# Patient Record
Sex: FEMALE | Race: BLACK OR AFRICAN AMERICAN | Employment: OTHER | URBAN - METROPOLITAN AREA
[De-identification: names, ages, dates, MRNs, and addresses within clinical notes are randomized per-mention and may not be internally consistent; named-entity substitution may affect disease eponyms.]

---

## 2018-09-26 RX ORDER — ROSUVASTATIN CALCIUM 5 MG/1
TABLET, COATED ORAL
Qty: 90 TAB | Refills: 1 | Status: SHIPPED | OUTPATIENT
Start: 2018-09-26 | End: 2019-04-09 | Stop reason: SDUPTHER

## 2018-09-27 RX ORDER — ENALAPRIL MALEATE 2.5 MG/1
TABLET ORAL
Qty: 90 TAB | Refills: 1 | Status: SHIPPED | OUTPATIENT
Start: 2018-09-27 | End: 2019-04-09 | Stop reason: SDUPTHER

## 2018-10-17 PROBLEM — H26.9 CATARACT: Status: ACTIVE | Noted: 2018-10-17

## 2018-10-17 PROBLEM — E11.9 DIABETES (HCC): Status: ACTIVE | Noted: 2018-10-17

## 2018-10-17 PROBLEM — I10 HTN (HYPERTENSION): Status: ACTIVE | Noted: 2018-10-17

## 2018-10-17 PROBLEM — E78.00 HYPERCHOLESTEREMIA: Status: ACTIVE | Noted: 2018-10-17

## 2018-10-17 RX ORDER — BISMUTH SUBSALICYLATE 262 MG
1 TABLET,CHEWABLE ORAL DAILY
COMMUNITY
End: 2019-03-05 | Stop reason: ALTCHOICE

## 2018-10-17 RX ORDER — ASPIRIN 81 MG/1
TABLET ORAL DAILY
COMMUNITY
End: 2020-12-01

## 2018-10-22 ENCOUNTER — OFFICE VISIT (OUTPATIENT)
Dept: FAMILY MEDICINE CLINIC | Age: 78
End: 2018-10-22

## 2018-10-22 VITALS
SYSTOLIC BLOOD PRESSURE: 168 MMHG | BODY MASS INDEX: 31.08 KG/M2 | TEMPERATURE: 97.7 F | DIASTOLIC BLOOD PRESSURE: 75 MMHG | WEIGHT: 198 LBS | HEART RATE: 82 BPM | OXYGEN SATURATION: 97 % | HEIGHT: 67 IN

## 2018-10-22 DIAGNOSIS — I10 ESSENTIAL HYPERTENSION: Primary | ICD-10-CM

## 2018-10-22 DIAGNOSIS — E11.9 TYPE 2 DIABETES MELLITUS WITHOUT COMPLICATION, WITHOUT LONG-TERM CURRENT USE OF INSULIN (HCC): ICD-10-CM

## 2018-10-22 DIAGNOSIS — E78.00 HYPERCHOLESTEREMIA: ICD-10-CM

## 2018-10-22 RX ORDER — ERGOCALCIFEROL 1.25 MG/1
50000 CAPSULE ORAL
COMMUNITY
End: 2019-03-05 | Stop reason: ALTCHOICE

## 2018-10-22 NOTE — PROGRESS NOTES
Lamberto Uriostegui is a 70 y.o. female presenting for Follow-up (6month f/u visit)  . HTN  The patient presents today for HTN follow-up. Currently taking enalapril 2.5mg  Compliance good. Taking medications daily w/o complications. Home BP readings range from 130's. SIde effects of meds:  none  Comorbid conditions:  High chol, glucose intolerance  Aspirin?  daily     Hypercholesterolemia  Medications:  rosuvastatin  Taking medication consistently  Patient denies side effects:  Myalgias, memory loss    Glucose intolerance  On sugar free diet, watching carbs,    No meds    Review of Systems   Respiratory: Negative for cough and shortness of breath. Cardiovascular: Negative for chest pain. Neurological: Negative for loss of consciousness.        Past Medical History:   Diagnosis Date    Cataract     Diverticulosis     Hyperlipidemia     Hypertension     Onychomycosis     Sciatica     Transient global amnesia      Past Surgical History:   Procedure Laterality Date    HX COLONOSCOPY  2014 12/2014: ext hemorrhoids, diverticulosis, carpet-like polyp (resected by Dr. Rai Almanzar 2/12/15) Repeat due 8/2018     HX HIP REPLACEMENT Right     HX HYSTERECTOMY  1996    HX KNEE REPLACEMENT Right      Family History   Problem Relation Age of Onset    Hypertension Mother     Stroke Mother     Diabetes Mother     Hypertension Maternal Grandmother     Stroke Maternal Grandmother      Social History     Socioeconomic History    Marital status: UNKNOWN     Spouse name: Not on file    Number of children: Not on file    Years of education: Not on file    Highest education level: Not on file   Social Needs    Financial resource strain: Not on file    Food insecurity - worry: Not on file    Food insecurity - inability: Not on file   [employer] needs - medical: Not on file   [employer] needs - non-medical: Not on file   Occupational History    Not on file   Tobacco Use    Smoking status: Never Smoker    Smokeless tobacco: Never Used   Substance and Sexual Activity    Alcohol use: No     Frequency: Never    Drug use: No    Sexual activity: Yes   Other Topics Concern    Not on file   Social History Narrative    Not on file         Current Outpatient Medications   Medication Sig Dispense Refill    ergocalciferol (VITAMIN D2) 50,000 unit capsule Take 50,000 Units by mouth.  aspirin delayed-release 81 mg tablet Take  by mouth daily.  docosahexanoic acid/epa (FISH OIL PO) Take  by mouth.  multivitamin (ONE A DAY) tablet Take 1 Tab by mouth daily.  enalapril (VASOTEC) 2.5 mg tablet TAKE 1 TABLET EVERY DAY 90 Tab 1    rosuvastatin (CRESTOR) 5 mg tablet TAKE 1 TABLET EVERY DAY 90 Tab 1         Allergies   Allergen Reactions    Penicillins Unknown (comments) and Swelling     None noted        Vitals:    10/22/18 1314   BP: 168/75   Pulse: 82   Temp: 97.7 °F (36.5 °C)   TempSrc: Oral   SpO2: 97%   Weight: 198 lb (89.8 kg)   Height: 5' 7\" (1.702 m)     Body mass index is 31.01 kg/m². Objective  General: Patient alert and oriented and in NAD  HEENT: PER/EOMI, no conjunctival pallor or scleral icterus. No thyromegaly or cervical lymphadenopathy  Heart: Regular rate and rhythm, No murmurs, rubs or gallops. Lungs: Clear to auscultation bilaterally, no wheezing, rales or rhonchi  Abd: +BS, non-tender, non-distended  Ext: No edema  Skin: No rashes or lesions noted on exposed skin  Neuro: AAOx3  Psych: Appropriate mood and affect      Assessment and Plan:    1. Essential hypertension  BP not at goal.  Weekly BP checks with nurses and FU in one month if not at goal (625/22)  - METABOLIC PANEL, BASIC  - MICROALBUMIN, UR, RAND W/ MICROALB/CREAT RATIO    2. Hypercholesteremia  On statin and ASA  - HEPATIC FUNCTION PANEL  - LIPID PANEL  - TSH 3RD GENERATION    3.  Type 2 diabetes mellitus without complication, without long-term current use of insulin ([address]  Reviewed diet again with patient. NO sugar, lower starch (100 grams of carbs per day) and 10-12 hour fast overnight.  - CBC WITH AUTOMATED DIFF  - HEMOGLOBIN A1C WITH EAG          Diagnosis and plan discussed with patient who verbillized understanding. Follow-up Disposition:  Return in about 6 months (around 4/22/2019) for Diabetes follow up.     [facility]

## 2018-10-22 NOTE — PROGRESS NOTES
Laura Goncalves is a 70 y.o. female      Chief Complaint   Patient presents with    Follow-up     6month f/u visit         1. Have you been to the ER, urgent care clinic since your last visit? Hospitalized since your last visit?  no    2. Have you seen or consulted any other health care providers outside of the [address] [place] [address] since your last visit? Include any pap smears or colon screening.    no

## 2018-10-23 LAB
ALBUMIN SERPL-MCNC: 4.6 G/DL (ref 3.5–4.8)
ALBUMIN/CREAT UR: 3.9 MG/G CREAT (ref 0–30)
ALP SERPL-CCNC: 78 IU/L (ref 39–117)
ALT SERPL-CCNC: 26 IU/L (ref 0–32)
AST SERPL-CCNC: 33 IU/L (ref 0–40)
BASOPHILS # BLD AUTO: 0 X10E3/UL (ref 0–0.2)
BASOPHILS NFR BLD AUTO: 1 %
BILIRUB DIRECT SERPL-MCNC: 0.16 MG/DL (ref 0–0.4)
BILIRUB SERPL-MCNC: 0.5 MG/DL (ref 0–1.2)
BUN SERPL-MCNC: 13 MG/DL (ref 8–27)
BUN/CREAT SERPL: 19 (ref 12–28)
CALCIUM SERPL-MCNC: 9.8 MG/DL (ref 8.7–10.3)
CHLORIDE SERPL-SCNC: 101 MMOL/L (ref 96–106)
CHOLEST SERPL-MCNC: 179 MG/DL (ref 100–199)
CO2 SERPL-SCNC: 24 MMOL/L (ref 20–29)
CREAT SERPL-MCNC: 0.68 MG/DL (ref 0.57–1)
CREAT UR-MCNC: 148.1 MG/DL
EOSINOPHIL # BLD AUTO: 0.2 X10E3/UL (ref 0–0.4)
EOSINOPHIL NFR BLD AUTO: 4 %
ERYTHROCYTE [DISTWIDTH] IN BLOOD BY AUTOMATED COUNT: 15.8 % (ref 12.3–15.4)
EST. AVERAGE GLUCOSE BLD GHB EST-MCNC: 137 MG/DL
GLUCOSE SERPL-MCNC: 82 MG/DL (ref 65–99)
HBA1C MFR BLD: 6.4 % (ref 4.8–5.6)
HCT VFR BLD AUTO: 45.9 % (ref 34–46.6)
HDLC SERPL-MCNC: 74 MG/DL
HGB BLD-MCNC: 14.9 G/DL (ref 11.1–15.9)
IMM GRANULOCYTES # BLD: 0 X10E3/UL (ref 0–0.1)
IMM GRANULOCYTES NFR BLD: 0 %
LDLC SERPL CALC-MCNC: 90 MG/DL (ref 0–99)
LYMPHOCYTES # BLD AUTO: 2.6 X10E3/UL (ref 0.7–3.1)
LYMPHOCYTES NFR BLD AUTO: 52 %
MCH RBC QN AUTO: 25.8 PG (ref 26.6–33)
MCHC RBC AUTO-ENTMCNC: 32.5 G/DL (ref 31.5–35.7)
MCV RBC AUTO: 79 FL (ref 79–97)
MICROALBUMIN UR-MCNC: 5.8 UG/ML
MONOCYTES # BLD AUTO: 0.4 X10E3/UL (ref 0.1–0.9)
MONOCYTES NFR BLD AUTO: 7 %
NEUTROPHILS # BLD AUTO: 1.8 X10E3/UL (ref 1.4–7)
NEUTROPHILS NFR BLD AUTO: 36 %
PLATELET # BLD AUTO: 288 X10E3/UL (ref 150–379)
POTASSIUM SERPL-SCNC: 4.5 MMOL/L (ref 3.5–5.2)
PROT SERPL-MCNC: 7.6 G/DL (ref 6–8.5)
RBC # BLD AUTO: 5.78 X10E6/UL (ref 3.77–5.28)
SODIUM SERPL-SCNC: 145 MMOL/L (ref 134–144)
TRIGL SERPL-MCNC: 75 MG/DL (ref 0–149)
TSH SERPL DL<=0.005 MIU/L-ACNC: 2.4 UIU/ML (ref 0.45–4.5)
VLDLC SERPL CALC-MCNC: 15 MG/DL (ref 5–40)
WBC # BLD AUTO: 5 X10E3/UL (ref 3.4–10.8)

## 2018-10-24 NOTE — PROGRESS NOTES
Call patient. Labs look good. Long term blood sugar test is still up but she does not need medication as long as she sticks to diet:  NO sugar, less starch (bread, noodles, pasta, potatoes, french fries  Be sure to get blood pressure checked as we discussed.   Goal blood pressure is under 140/90  See me in 6 months or sooner if BP is not at goal  [facility]

## 2018-10-24 NOTE — PROGRESS NOTES
Lab letter mailed to patient with following message from Dr. Jose R Webster look good. Long term blood sugar test is still up but you do not need medication as long as you sticks to the diet:  NO sugar, less starch (bread, noodles, pasta, potatoes, french fries etc.)  Be sure to get your blood pressure checked as we discussed. Limit salt in your diet. Goal blood pressure is under 140/90  See me in 6 months or sooner if Blood Pressure is not at goal  Call back number provided if patient has questions.

## 2019-03-05 ENCOUNTER — OFFICE VISIT (OUTPATIENT)
Dept: FAMILY MEDICINE CLINIC | Age: 79
End: 2019-03-05

## 2019-03-05 VITALS
RESPIRATION RATE: 18 BRPM | WEIGHT: 198 LBS | TEMPERATURE: 98.4 F | OXYGEN SATURATION: 97 % | HEIGHT: 67 IN | BODY MASS INDEX: 31.08 KG/M2 | DIASTOLIC BLOOD PRESSURE: 82 MMHG | HEART RATE: 102 BPM | SYSTOLIC BLOOD PRESSURE: 144 MMHG

## 2019-03-05 DIAGNOSIS — Z96.652 STATUS POST TOTAL LEFT KNEE REPLACEMENT: Primary | ICD-10-CM

## 2019-03-05 RX ORDER — ENALAPRIL MALEATE 2.5 MG/1
TABLET ORAL
COMMUNITY
Start: 2018-09-27 | End: 2019-03-05 | Stop reason: SDUPTHER

## 2019-03-05 RX ORDER — RIVAROXABAN 10 MG/1
TABLET, FILM COATED ORAL
Refills: 0 | COMMUNITY
Start: 2019-03-01 | End: 2019-05-21

## 2019-03-05 RX ORDER — ONDANSETRON 4 MG/1
TABLET, ORALLY DISINTEGRATING ORAL
Refills: 0 | COMMUNITY
Start: 2019-03-01 | End: 2019-05-21

## 2019-03-05 RX ORDER — HYDROCODONE BITARTRATE AND ACETAMINOPHEN 5; 325 MG/1; MG/1
TABLET ORAL
Refills: 0 | COMMUNITY
Start: 2019-03-01 | End: 2019-05-21

## 2019-03-05 RX ORDER — ROSUVASTATIN CALCIUM 5 MG/1
TABLET, COATED ORAL
COMMUNITY
Start: 2018-09-26 | End: 2019-04-09 | Stop reason: SDUPTHER

## 2019-03-05 NOTE — PROGRESS NOTES
Edvin Canada is a 70 y.o. female      Chief Complaint   Patient presents with    Follow-up     Left knee replacement          1. Have you been to the ER, urgent care clinic since your last visit? Hospitalized since your last visit? Yes knee replacement left 2/27/19    2. Have you seen or consulted any other health care providers outside of the [address] [place] [address] since your last visit? Include any pap smears or colon screening.   no

## 2019-03-05 NOTE — PATIENT INSTRUCTIONS
Total Knee Replacement: What to Expect at [address] [place] [address]    When you leave the hospital, you should be able to move around with a walker or crutches. But you will need someone to help you at home for the next few weeks or until you have more energy and can move around better. If there is no one to help you at home, you may go to a rehabilitation center. You will go home with a bandage and stitches, staples, tissue glue, or tape strips. Change the bandage as your doctor tells you to. If you have stitches or staples, your doctor will remove them 10 to 21 days after your surgery. Glue or tape strips will fall off on their own over time. You may still have some mild pain, and the area may be swollen for 3 to 6 months after surgery. Your knee will continue to improve for 6 to 12 months. You will probably use a walker for 1 to 3 weeks and then use crutches. When you are ready, you can use a cane. You will probably be able to walk on your own in 4 to 8 weeks. You will need to do months of physical rehabilitation (rehab) after a knee replacement. Rehab will help you strengthen the muscles of the knee and help you regain movement. After you recover, your artificial knee will allow you to do normal daily activities with less pain or no pain at all. You may be able to hike, dance, ride a bike, and play golf. Talk to your doctor about whether you can do more strenuous activities. Always tell your caregivers that you have an artificial knee. How long it will take to walk on your own, return to normal activities, and go back to work depends on your health and how well your rehabilitation (rehab) program goes. The better you do with your rehab exercises, the quicker you will get your strength and movement back. This care sheet gives you a general idea about how long it will take for you to recover. But each person recovers at a different pace. Follow the steps below to get better as quickly as possible.   How can you care for yourself at home? Activity    · Rest when you feel tired. You may take a nap, but do not stay in bed all day. When you sit, use a chair with arms. You can use the arms to help you stand up.     · Work with your physical therapist to find the best way to exercise. You may be able to take frequent, short walks using crutches or a walker. What you can do as your knee heals will depend on whether your new knee is cemented or uncemented. You may not be able to do certain things for a while if your new knee is uncemented.     · After your knee has healed enough, you can do more strenuous activities with caution. ? You can golf, but use a golf cart, and do not wear shoes with spikes. ? You can bike on a flat road or on a stationary bike. Avoid biking up hills. ? Your doctor may suggest that you stay away from activities that put stress on your knee. These include tennis or badminton, squash or racquetball, contact sports like football, jumping (such as in basketball), jogging, or running. ? Avoid activities where you might fall. These include horseback riding, skiing, and mountain biking.     · Do not sit for more than 1 hour at a time. Get up and walk around for a while before you sit again. If you must sit for a long time, prop up your leg with a chair or footstool. This will help you avoid swelling.     · Ask your doctor when you can drive again. It may take up to 8 weeks after knee replacement surgery before it is safe for you to drive.     · When you get into a car, sit on the edge of the seat. Then pull in your legs, and turn to face the front.     · You should be able to do many everyday activities 3 to 6 weeks after your surgery. You will probably need to take 4 to 16 weeks off from work.  When you can go back to work depends on the type of work you do and how you feel.     · Ask your doctor when it is okay for you to have sex.     · Do not lift anything heavier than 10 pounds and do not lift weights for 12 weeks. Diet    · By the time you leave the hospital, you should be eating your normal diet. If your stomach is upset, try bland, low-fat foods like plain rice, broiled chicken, toast, and yogurt. Your doctor may suggest that you take iron and vitamin supplements.     · Drink plenty of fluids (unless your doctor tells you not to).   · Eat healthy foods, and watch your portion sizes. Try to stay at your ideal weight. Too much weight puts more stress on your new knee.     · You may notice that your bowel movements are not regular right after your surgery. This is common. Try to avoid constipation and straining with bowel movements. You may want to take a fiber supplement every day. If you have not had a bowel movement after a couple of days, ask your doctor about taking a mild laxative. Medicines    · Your doctor will tell you if and when you can restart your medicines. He or she will also give you instructions about taking any new medicines.     · If you take blood thinners, such as warfarin (Coumadin), clopidogrel (Plavix), or aspirin, be sure to talk to your doctor. He or she will tell you if and when to start taking those medicines again. Make sure that you understand exactly what your doctor wants you to do.     · Your doctor may give you a blood-thinning medicine to prevent blood clots. If you take a blood thinner, be sure you get instructions about how to take your medicine safely. Blood thinners can cause serious bleeding problems. This medicine could be in pill form or as a shot (injection). If a shot is necessary, your doctor will tell you how to do this.     · Be safe with medicines. Take pain medicines exactly as directed. ? If the doctor gave you a prescription medicine for pain, take it as prescribed. ? If you are not taking a prescription pain medicine, ask your doctor if you can take an over-the-counter medicine. ? Plan to take your pain medicine 30 minutes before exercises.  It is easier to prevent pain before it starts than to stop it once it has started.     · If you think your pain medicine is making you sick to your stomach:  ? Take your medicine after meals (unless your doctor has told you not to). ? Ask your doctor for a different pain medicine.     · If your doctor prescribed antibiotics, take them as directed. Do not stop taking them just because you feel better. You need to take the full course of antibiotics. Incision care    · If your doctor told you how to care for your cut (incision), follow your doctor's instructions. You will have a dressing over the cut. A dressing helps the incision heal and protects it. Your doctor will tell you how to take care of this.     · If you did not get instructions, follow this general advice:  ? If you have strips of tape on the cut the doctor made, leave the tape on for a week or until it falls off.  ? If you have stitches or staples, your doctor will tell you when to come back to have them removed. ? If you have skin adhesive on the cut, leave it on until it falls off. Skin adhesive is also called glue or liquid stitches. ? Change the bandage every day. ? Wash the area daily with warm water, and pat it dry. Don't use hydrogen peroxide or alcohol. They can slow healing. ? You may cover the area with a gauze bandage if it oozes fluid or rubs against clothing. ? You may shower 24 to 48 hours after surgery. Pat the incision dry. Don't swim or take a bath for the first 2 weeks, or until your doctor tells you it is okay. Exercise    · Your rehab program will give you a number of exercises to do to help you get back your knee's range of motion and strength. Always do them as your therapist tells you. Ice and elevation    · For pain and swelling, put ice or a cold pack on the area for 10 to 20 minutes at a time. Put a thin cloth between the ice and your skin. Other instructions    · Continue to wear your support stockings as your doctor says. These help to prevent blood clots. The length of time that you will have to wear them depends on your activity level and the amount of swelling.     · You have metal pieces in your knee. These may set off some airport metal detectors. Carry a medical alert card that says you have an artificial joint, just in case. Follow-up care is a key part of your treatment and safety. Be sure to make and go to all appointments, and call your doctor if you are having problems. It's also a good idea to know your test results and keep a list of the medicines you take. When should you call for help? Call 911 anytime you think you may need emergency care. For example, call if:    · You passed out (lost consciousness).     · You have severe trouble breathing.     · You have sudden chest pain and shortness of breath, or you cough up blood.    Call your doctor now or seek immediate medical care if:    · You have signs of infection, such as:  ? Increased pain, swelling, warmth, or redness. ? Red streaks leading from the incision. ? Pus draining from the incision. ? A fever.     · You have signs of a blood clot, such as:  ? Pain in your calf, back of the knee, thigh, or groin. ? Redness and swelling in your leg or groin.     · Your incision comes open and begins to bleed, or the bleeding increases.     · You have pain that does not get better after you take pain medicine.    Watch closely for changes in your health, and be sure to contact your doctor if:    · You do not have a bowel movement after taking a laxative. Where can you learn more? Go to http://fredrick-elza.info/. Enter Q042 in the search box to learn more about \"Total Knee Replacement: What to Expect at Home. \"  Current as of: September 20, 2018  Content Version: 11.9  © 0009-9968 [employer]. Care instructions adapted under license by [employer] (which disclaims liability or warranty for this information).  If you have questions about a medical condition or this instruction, always ask your healthcare professional. [place] [address] any warranty or liability for your use of this information.

## 2019-04-09 ENCOUNTER — OFFICE VISIT (OUTPATIENT)
Dept: FAMILY MEDICINE CLINIC | Age: 79
End: 2019-04-09

## 2019-04-09 VITALS
HEIGHT: 67 IN | OXYGEN SATURATION: 98 % | HEART RATE: 91 BPM | TEMPERATURE: 98.5 F | DIASTOLIC BLOOD PRESSURE: 77 MMHG | SYSTOLIC BLOOD PRESSURE: 150 MMHG | BODY MASS INDEX: 32.02 KG/M2 | WEIGHT: 204 LBS | RESPIRATION RATE: 20 BRPM

## 2019-04-09 DIAGNOSIS — E74.39 GLUCOSE INTOLERANCE: ICD-10-CM

## 2019-04-09 DIAGNOSIS — I10 ESSENTIAL HYPERTENSION: Primary | ICD-10-CM

## 2019-04-09 DIAGNOSIS — E78.00 HYPERCHOLESTEREMIA: ICD-10-CM

## 2019-04-09 RX ORDER — ROSUVASTATIN CALCIUM 5 MG/1
5 TABLET, COATED ORAL DAILY
Qty: 90 TAB | Refills: 1 | Status: SHIPPED | OUTPATIENT
Start: 2019-04-09 | End: 2019-09-19 | Stop reason: SDUPTHER

## 2019-04-09 RX ORDER — ENALAPRIL MALEATE 2.5 MG/1
2.5 TABLET ORAL DAILY
Qty: 90 TAB | Refills: 1 | Status: SHIPPED | OUTPATIENT
Start: 2019-04-09 | End: 2019-05-21 | Stop reason: DRUGHIGH

## 2019-04-09 NOTE — PROGRESS NOTES
1. Have you been to the ER, urgent care clinic since your last visit? Hospitalized since your last visit? No    2. Have you seen or consulted any other health care providers outside of the [address] [place] [address] since your last visit? Include any pap smears or colon screening.  No

## 2019-04-09 NOTE — PROGRESS NOTES
Assessment and Plan    1. Essential hypertension  Near goal  - enalapril (VASOTEC) 2.5 mg tablet; Take 1 Tab by mouth daily. Dispense: 90 Tab; Refill: 1    2. Hypercholesteremia  On statin  - rosuvastatin (CRESTOR) 5 mg tablet; Take 1 Tab by mouth daily. Dispense: 90 Tab; Refill: 1    3. Glucose intolerance  Diet for weight loss and glucose lowering given. - METABOLIC PANEL, BASIC  - HEMOGLOBIN A1C WITH EAG  - CBC WITH AUTOMATED DIFF      Diagnosis and plan discussed with patient who verbillized understanding. Follow-up and Dispositions    · Return in about 6 months (around 10/9/2019) for Medication follow up, Blood pressure follow up. History of present Mariana Rodarte is a 70 y.o. female presenting for Medication Refill    Hypertension  Taking ACE consistently  Recent knee replacement without problems  No chest pain or dyspnea    Hyperchol  On crestor  Takes consistently    Glucose intolerance  Last A1c 6.4    Review of Systems   Respiratory: Negative for shortness of breath. Cardiovascular: Negative for chest pain and leg swelling. Gastrointestinal: Negative for blood in stool. Genitourinary: Negative for hematuria. Musculoskeletal: Positive for joint pain.        All other systems reviewed and are negative for a Comprehensive ROS (10+)    Past Medical History:   Diagnosis Date    Cataract     Diverticulosis     Hyperlipidemia     Hypertension     Onychomycosis     Sciatica     Transient global amnesia      Past Surgical History:   Procedure Laterality Date    HX COLONOSCOPY  2014 12/2014: ext hemorrhoids, diverticulosis, carpet-like polyp (resected by Dr. Scottie Harrell 2/12/15) Repeat due 8/2018     HX HIP REPLACEMENT Right     HX HYSTERECTOMY  1996    HX KNEE REPLACEMENT Right     HX KNEE REPLACEMENT      left 2019      Family History   Problem Relation Age of Onset    Hypertension Mother     Stroke Mother     Diabetes Mother     Hypertension Maternal Grandmother  Stroke Maternal Grandmother      Social History     Socioeconomic History    Marital status:      Spouse name: Not on file    Number of children: Not on file    Years of education: Not on file    Highest education level: Not on file   Occupational History    Not on file   Social Needs    Financial resource strain: Not on file    Food insecurity:     Worry: Not on file     Inability: Not on file    Transportation needs:     Medical: Not on file     Non-medical: Not on file   Tobacco Use    Smoking status: Never Smoker    Smokeless tobacco: Never Used   Substance and Sexual Activity    Alcohol use: No     Frequency: Never    Drug use: No    Sexual activity: Yes   Lifestyle    Physical activity:     Days per week: Not on file     Minutes per session: Not on file    Stress: Not on file   Relationships    Social connections:     Talks on phone: Not on file     Gets together: Not on file     Attends Sabianism service: Not on file     Active member of club or organization: Not on file     Attends meetings of clubs or organizations: Not on file     Relationship status: Not on file    Intimate partner violence:     Fear of current or ex partner: Not on file     Emotionally abused: Not on file     Physically abused: Not on file     Forced sexual activity: Not on file   Other Topics Concern    Not on file   Social History Narrative    Not on file         Current Outpatient Medications   Medication Sig Dispense Refill    docosahexanoic acid/epa (FISH OIL PO) Take  by mouth.  CALCIUM PO Take  by mouth.  MULTIVITAMIN PO Take  by mouth.  rosuvastatin (CRESTOR) 5 mg tablet Take 1 Tab by mouth daily. 90 Tab 1    enalapril (VASOTEC) 2.5 mg tablet Take 1 Tab by mouth daily. 90 Tab 1    calcium carbonate-vitamin d2 500 mg(1,250mg) -200 unit tab Take  by mouth.  aspirin delayed-release 81 mg tablet Take  by mouth daily.       HYDROcodone-acetaminophen (NORCO) 5-325 mg per tablet take 1-2 tablets by mouth every 4 hours if needed for pain  0    ondansetron (ZOFRAN ODT) 4 mg disintegrating tablet dissolve 1 tablet ON TONGUE every 4 hours if needed for nausea and vomiting  0    XARELTO 10 mg tablet take tablet by mouth once daily for ANTICOAGULATION  0    multivitamin, tx-iron-ca-min (THERA-M W/ IRON) 9 mg iron-400 mcg tab tablet Take 1 Tab by mouth daily. Allergies   Allergen Reactions    Penicillins Unknown (comments) and Swelling     None noted        Vitals:    04/09/19 1059   BP: 150/77   Pulse: 91   Resp: 20   Temp: 98.5 °F (36.9 °C)   TempSrc: Oral   SpO2: 98%   Weight: 204 lb (92.5 kg)   Height: 5' 7\" (1.702 m)     Body mass index is 31.95 kg/m². Objective  General: Patient alert and oriented and in NAD  Neck: No thyromegaly or cervical lymphadenopathy  Cardiovascular: Heart has regular rate and rhythm, No murmurs, rubs or gallops. No edema  Respiratory: Lungs are clear to auscultation bilaterally, no wheezing, rales or rhonchi, normal chest excursion and no increased work of breathing. Musculoskeletal: All four extremities present and functional.  Well healed Left TKR incision  Skin: No rashes or lesions noted on exposed skin  Neuro: AAOx3, normal gait and speech. No gross neurologic deficits. Psych: Appropriate mood and affect, no homicidal or suicidal ideation, no obsessions, delusions or hallucinations, normal psychomotor status.

## 2019-04-09 NOTE — PATIENT INSTRUCTIONS
Diabetes:  Blood sugar goals:  Hemoglobin A1c under 7  Fasting blood sugar   Blood sugar 2 hours after a meal under 180, 4 hours after a meal under 120  No hypoglycemia (sugars under 70 and symptomatic low sugars)    Blood sugar control with diet and exercise:  Exercise 45 minutes per day. This makes your insulin work better. It also allows insulin levels to fall helping with weight loss. Every night, try to fast from your evening meal to breakfast (at least 12 hours) without eating anything. This uses stored energy in your liver and makes insulin work better. Avoid simples sugars such as table sugar in drinks (sodas, lemonade, sweet tea, wine), desserts, candy. Also avoid fruit juices and high fructose corn syrup. Finally, drink less milk which has milk sugar in it. Control your starch intake. Men should have 3-4 carb portions per meal.  Women should have 2-3 carb portions per meal.  A carb serving is the equivalent of a slice of bread. Control your blood pressure and cholesterol. These problems are common in diabetes. AND, don't smoke. All of these problems contribute to heart disease and stroke risk. Get a yearly eye exam and flu shot. Make sure your vaccines are up to date particularly the pneumonia vaccines. Inspect your feet daily. Wear comfortable protective shoes and clean socks. Seek medical care if there are sore, calluses or numbness and burning of your feet. See your doctor at least every 6 months if you are on oral medicines or more often if the diabetic control is not at goal or if you are on insulin. Take your medicines religiously    . STOP the SUGAR    The first step in dietary efforts at weight loss is removing as much sugar from the diet as possible. Most dietary sugar is in the forms of table sugar (sucrose), fruit sugar (fructose) and milk sugar (lactose).   But, as the picture above demonstrates, you need to watch labels to see if processed foods have added sugars under other names. To eliminate sucrose, eliminate sweet drinks entirely including soft drinks, sports drinks, lemonade, sweet tea and wine. Also, eliminate candy and make desserts a \"special occasion only treat\". Don't eat desserts with every meal or every night. Most fructose is found in fruit and this should be markedly limited. Fruit juice should be avoided. One piece of fruit daily is the limit. Berries are a good choice to eat as a garnish for other foods. Bananas and grapes should be avoided. Milk sugar, or lactose, should be avoided as well. Milk is a good source of calcium but not for people struggling with weight issues. Put a little milk in your coffee or tea but otherwise avoid milk. How can you avoid sugar? For starters, don't buy it and don't bring it in the house. It won't tempt you that way! For more information:    Try the internet for videos about sugar addiction or try diet doctor.com. Carb Counting in Diabetes    Carbohydrate or carb counting is a method of calculating grams of carbohydrate consumed at meals and snacks. Foods that contain carbohydrate have the greatest effect on blood sugar compared to foods that contain protein or fat. A low carb diet has the greatest likelihood of promoting weight loss. What Foods Have Carbohydrate? Foods that contain carbohydrate or carbs are:    -grains like wheat, rice, oatmeal, and barley  -grain-based foods like bread, cereal, pasta, and crackers  -starchy vegetables like potatoes, peas and corn  -fruit and juice  -milk and yogurt  -dried beans and peas and soy products like veggie burgers  -sweets and snack foods like sodas, juice drinks, cake, cookies, candy, and chips    Non-starchy vegetables like lettuce, cucumbers, broccoli, and cauliflower have very little carbohydrate and minimal impact on your blood glucose.     Carbohydrate Servings    -In meal planning, 1 serving of a food with carbohydrate has about  15 grams of carbohydrate:  -Check serving sizes with measuring cups and spoons or a food scale. -Read the Nutrition Facts on food labels to find out how many grams of carbohydrate are in foods you eat.   -1 carb serving (15 grams) is roughly equal to one piece of bread    How much carbohydrate should I eat? Diabetics are advised to eat: For women-30-45 grams or 2-3 carb servings per meal.  For men-45-60 grams or 3-4 carb servings per meal.    For weight loss, patients should try to eat under 100 grams of carbs per day. How do I \"manage\" carbs?    -First, eliminate sugar in the form of soft drinks, candy and desserts. Minimize cakes, cookies, smoothies and juices.  -Next, identify the carbs you are eating. Watch labels and know when you are eating a starch. Eat less bread, noodles, pasta, rice, potatoes, french fries, cereals, chips, crackers and yogurt  -Eat more healthy proteins and fats with more eggs, full fat dairy products, non starchy vegetables, salads, meat, poultry, fish, cheese, berries, nuts, olive oil and butter.  -Avoid beer. Europeans refer to beer as \"liquid bread\" and it is made from grains with a high carb content. Where can I find more information? There is a lot of information about carb counting on the internet and there are books about carb counting. Try the American Diabetes Association and Dietdoctor. com        EXERCISE AND WEIGHT LOSS    You'll hear lots of different things about weight loss and exercise. There is controversy about how much exercise helps with weight loss. We'll review what you should do about exercise and a weight plan here. First, it is hard to lose weight just with exercise. It takes about 3500 extra calories burned to lose a single pound. To put exercise in perspective, most people burn about 150 calories per mile if they walk or run. Doing the math, it takes over 23 miles to burn up the energy to lose one pound.   So if you want to lose weight, exercise by itself is unlikely to help with weight loss very much. You need to work on diet and exercise at the same time to lose weight. And, you need to work on your habits and emotions since they have huge impacts on eating behaviors. But, exercise does help with weight loss. If you exercise, you burn stored fat in your muscles and that allows the levels of insulin in your body to fall. This allows the enzyme that burns fat to \"switch on\" and use stored fat for energy. That combined with a no sugar, lower starch diet combines to promote weight loss. Think of it this way:  exercise permits weight loss! Most people that lose weight and keep it off exercise. What's the right exercise? The best exercise is the one you enjoy and can keep doing! Exercise that makes you feel good physically and makes you feel good about yourself is ideal.      Exercise that elevates your heart rate for a sustained period such as running, biking,  walking and swimming improves your cardiovascular fitness. Resistance exercises such as weight lifting, strength training or calisthenics also clearly improve cardiovascular health and weight control. Stretching and yoga help with flexibility and balance. Ideally, an exercise program should include all of these different types of exercise. How much should I exercise? For weight loss, you should aim to exercise 45 minutes per day, 5-6 days per week. When you exercise 45 minutes, you exhaust the supply of fat your muscles store for energy and you help you body turn on the enzyme that burns stored fat elsewhere. This is the minimum amount of exercise you should shoot for. Remember, you don't need to think of exercise as strictly an organized period of time where that's all you do. You can increase your exercise in all your daily activities. Walk more at work or school. If you can complete an errand by walking instead of driving, do it.   Park farther away from the store if you go shopping and force yourself to walk farther. On breaks at work, walk around the office and greet your coworkers instead of sitting at your desk. Redgie Kales a few calories and enjoy the company of others. Go for a walk around the sports field while the kids practice sports. Take another parent with you. What are other benefits of exercise? While exercise helps you lose weight, it is helping you in lots of other ways. Exercise lowers your risk of diabetes and high blood pressure and makes your heart healthier. It lowers your risk of heart attacks. Exercise can help chronic lung disease and congestive heart failure improve. Exercise lowers the risk of dementia including Alzheimer's disease. Exercise lowers the risk of some cancers and decreases the risk of osteoporosis. And interestingly, exercise helps with emotionally health and lowers the risk and severity of emotional illnesses such as depression. Said simply, exercise helps you live longer and better. What will help me keep up the exercise? Remember that exercise is your gift to yourself and your family. So schedule time for your exercise and be selfish about guarding that time. It's yours! Exercise with a friend or friends and make exercise a social activity that you look forward to. Friends keep each other honest and accountable. Think of how you feel when you exercise. Most people just feel better physically and emotionally. Remember that feeling and try to recapture it. Remember exercise will help you live longer and better and will help you be there for your children and grandchildren. Make that commitment for your family. And don't forget to tell yourself that while you feel better you look better! Laureano Pat said it:  \"You look marvelous! \"      LOSE WEIGHT WHILE YOU SLEEP    Fasting    Fasting is part of a weight loss program.  Really?!?  Yes.   Fasting has been part of the human condition forever. In our modern society, many of us rarely miss a meal but our ancestors often faced prolonged periods of food scarcity so our bodies store calories as fat for that possibility. Fasting, even for a short period, helps us lose weight by burning stored sugar and fat in our liver and \"switching on\" the enzymes that help us burn stored fat. How often should I fast?    You should fast every night! It's important to give our systems a rest from eating and we should fast every night between our evening meal and breakfast.  You should try to have at least 12 hours every night where you aren't eating at all. Longer fasts    You can consider longer periods of fasting to lose weight but first a few cautions. Make sure you stay well hydrated. Drink plenty of water. If you take medications for weight loss or diabetes, discuss fasting with your doctor first.        Sleep    You can lose weight while you sleep! It makes sense if you think about it. When you sleep, the body's machinery is still running and you aren't taking in any additional calories. And, research shows that good sleeping habits promote weight loss. Sleep too little and weight loss is more difficult. Ideally for weight loss, you should sleep 7-8 hours each night. Interestingly, if you sleep at a cooler temperature, you lose more weight. You body burns more calories to stay warm.

## 2019-04-12 LAB
BASOPHILS # BLD AUTO: 0 X10E3/UL (ref 0–0.2)
BASOPHILS NFR BLD AUTO: 0 %
BUN SERPL-MCNC: 13 MG/DL (ref 8–27)
BUN/CREAT SERPL: 19 (ref 12–28)
CALCIUM SERPL-MCNC: 9.5 MG/DL (ref 8.7–10.3)
CHLORIDE SERPL-SCNC: 101 MMOL/L (ref 96–106)
CO2 SERPL-SCNC: 26 MMOL/L (ref 20–29)
CREAT SERPL-MCNC: 0.69 MG/DL (ref 0.57–1)
EOSINOPHIL # BLD AUTO: 0.1 X10E3/UL (ref 0–0.4)
EOSINOPHIL NFR BLD AUTO: 3 %
ERYTHROCYTE [DISTWIDTH] IN BLOOD BY AUTOMATED COUNT: 15.4 % (ref 12.3–15.4)
EST. AVERAGE GLUCOSE BLD GHB EST-MCNC: 131 MG/DL
GLUCOSE SERPL-MCNC: 94 MG/DL (ref 65–99)
HBA1C MFR BLD: 6.2 % (ref 4.8–5.6)
HCT VFR BLD AUTO: 42.5 % (ref 34–46.6)
HGB BLD-MCNC: 13 G/DL (ref 11.1–15.9)
IMM GRANULOCYTES # BLD AUTO: 0 X10E3/UL (ref 0–0.1)
IMM GRANULOCYTES NFR BLD AUTO: 0 %
LYMPHOCYTES # BLD AUTO: 2 X10E3/UL (ref 0.7–3.1)
LYMPHOCYTES NFR BLD AUTO: 44 %
MCH RBC QN AUTO: 25.1 PG (ref 26.6–33)
MCHC RBC AUTO-ENTMCNC: 30.6 G/DL (ref 31.5–35.7)
MCV RBC AUTO: 82 FL (ref 79–97)
MONOCYTES # BLD AUTO: 0.2 X10E3/UL (ref 0.1–0.9)
MONOCYTES NFR BLD AUTO: 5 %
NEUTROPHILS # BLD AUTO: 2.2 X10E3/UL (ref 1.4–7)
NEUTROPHILS NFR BLD AUTO: 48 %
PLATELET # BLD AUTO: 274 X10E3/UL (ref 150–379)
POTASSIUM SERPL-SCNC: 4.8 MMOL/L (ref 3.5–5.2)
RBC # BLD AUTO: 5.17 X10E6/UL (ref 3.77–5.28)
SODIUM SERPL-SCNC: 140 MMOL/L (ref 134–144)
WBC # BLD AUTO: 4.6 X10E3/UL (ref 3.4–10.8)

## 2019-05-21 ENCOUNTER — OFFICE VISIT (OUTPATIENT)
Dept: FAMILY MEDICINE CLINIC | Age: 79
End: 2019-05-21

## 2019-05-21 VITALS
SYSTOLIC BLOOD PRESSURE: 144 MMHG | HEIGHT: 67 IN | TEMPERATURE: 98.4 F | DIASTOLIC BLOOD PRESSURE: 80 MMHG | BODY MASS INDEX: 31.08 KG/M2 | WEIGHT: 198 LBS | OXYGEN SATURATION: 98 % | RESPIRATION RATE: 16 BRPM | HEART RATE: 89 BPM

## 2019-05-21 DIAGNOSIS — I10 ESSENTIAL HYPERTENSION: Primary | ICD-10-CM

## 2019-05-21 RX ORDER — ENALAPRIL MALEATE AND HYDROCHLOROTHIAZIDE 5; 12.5 MG/1; MG/1
1 TABLET ORAL DAILY
Qty: 90 TAB | Refills: 1 | Status: SHIPPED | OUTPATIENT
Start: 2019-05-21 | End: 2019-10-16 | Stop reason: SDUPTHER

## 2019-05-21 NOTE — PROGRESS NOTES
Paola Mitchell is a 70 y.o. female      Chief Complaint   Patient presents with    Blood Pressure Check     Been running high          1. Have you been to the ER, urgent care clinic since your last visit? Hospitalized since your last visit? no      2. Have you seen or consulted any other health care providers outside of the [address] [place] [address] since your last visit? Include any pap smears or colon screening.  no

## 2019-05-21 NOTE — PROGRESS NOTES
Assessment and Plan    1. Essential hypertension  Not at goal.  Tolerating ACE well so will increase dose and add diuretic  - enalapril-hydroCHLOROthiazide (VASERETIC) 5-12.5 mg tablet; Take 1 Tab by mouth daily. Dispense: 90 Tab; Refill: 1      Follow-up and Dispositions    · Return in about 6 weeks (around 7/2/2019) for Blood pressure follow up. Diagnosis and plan discussed with patient who verbillized understanding. History of present Renata Francis is a 70 y.o. female presenting for Blood Pressure Check (Been running high )    Hypertension  Seen at GYN office with systolic BP of 256  No sx  Last 3 bp's here not at goal  Remains on very low dose enalapril    Review of Systems   Respiratory: Negative for shortness of breath. Cardiovascular: Positive for leg swelling. Negative for chest pain and palpitations. Gastrointestinal: Negative for blood in stool. Genitourinary: Negative for hematuria.          Past Medical History:   Diagnosis Date    Cataract     Diverticulosis     Hyperlipidemia     Hypertension     Onychomycosis     Sciatica     Transient global amnesia      Past Surgical History:   Procedure Laterality Date    HX COLONOSCOPY  2014 12/2014: ext hemorrhoids, diverticulosis, carpet-like polyp (resected by Dr. Jose De Los Santos 2/12/15) Repeat due 8/2018     HX HIP REPLACEMENT Right     HX HYSTERECTOMY  1996    HX KNEE REPLACEMENT Right     HX KNEE REPLACEMENT      left 2019      Family History   Problem Relation Age of Onset    Hypertension Mother     Stroke Mother     Diabetes Mother     Hypertension Maternal Grandmother     Stroke Maternal Grandmother      Social History     Socioeconomic History    Marital status:      Spouse name: Not on file    Number of children: Not on file    Years of education: Not on file    Highest education level: Not on file   Occupational History    Not on file   Social Needs    Financial resource strain: Not on file   Anu.Code Food insecurity:     Worry: Not on file     Inability: Not on file    Transportation needs:     Medical: Not on file     Non-medical: Not on file   Tobacco Use    Smoking status: Never Smoker    Smokeless tobacco: Never Used   Substance and Sexual Activity    Alcohol use: No     Frequency: Never    Drug use: No    Sexual activity: Yes   Lifestyle    Physical activity:     Days per week: Not on file     Minutes per session: Not on file    Stress: Not on file   Relationships    Social connections:     Talks on phone: Not on file     Gets together: Not on file     Attends Oriental orthodox service: Not on file     Active member of club or organization: Not on file     Attends meetings of clubs or organizations: Not on file     Relationship status: Not on file    Intimate partner violence:     Fear of current or ex partner: Not on file     Emotionally abused: Not on file     Physically abused: Not on file     Forced sexual activity: Not on file   Other Topics Concern    Not on file   Social History Narrative    Not on file         Current Outpatient Medications   Medication Sig Dispense Refill    enalapril-hydroCHLOROthiazide (VASERETIC) 5-12.5 mg tablet Take 1 Tab by mouth daily. 90 Tab 1    docosahexanoic acid/epa (FISH OIL PO) Take  by mouth.  MULTIVITAMIN PO Take  by mouth.  rosuvastatin (CRESTOR) 5 mg tablet Take 1 Tab by mouth daily. 90 Tab 1    calcium carbonate-vitamin d2 500 mg(1,250mg) -200 unit tab Take  by mouth.  aspirin delayed-release 81 mg tablet Take  by mouth daily. Allergies   Allergen Reactions    Penicillins Unknown (comments) and Swelling     None noted        Vitals:    05/21/19 0949   BP: 144/80   Pulse: 89   Resp: 16   Temp: 98.4 °F (36.9 °C)   TempSrc: Oral   SpO2: 98%   Weight: 198 lb (89.8 kg)   Height: 5' 7\" (1.702 m)     Body mass index is 31.01 kg/m².     Objective  General: Patient alert and oriented and in NAD  Neck: No thyromegaly or cervical lymphadenopathy  Cardiovascular: Heart has regular rate and rhythm, No murmurs, rubs or gallops. No edema  Respiratory: Lungs are clear to auscultation bilaterally, no wheezing, rales or rhonchi, normal chest excursion and no increased work of breathing. Musculoskeletal: All four extremities present and functional.   Skin: No rashes or lesions noted on exposed skin  Neuro: AAOx3, normal gait and speech. No gross neurologic deficits. Psych: Appropriate mood and affect, no homicidal or suicidal ideation, no obsessions, delusions or hallucinations, normal psychomotor status.

## 2019-07-16 ENCOUNTER — OFFICE VISIT (OUTPATIENT)
Dept: FAMILY MEDICINE CLINIC | Age: 79
End: 2019-07-16

## 2019-07-16 VITALS
RESPIRATION RATE: 16 BRPM | OXYGEN SATURATION: 96 % | HEART RATE: 83 BPM | HEIGHT: 67 IN | WEIGHT: 197 LBS | DIASTOLIC BLOOD PRESSURE: 74 MMHG | TEMPERATURE: 98.6 F | BODY MASS INDEX: 30.92 KG/M2 | SYSTOLIC BLOOD PRESSURE: 121 MMHG

## 2019-07-16 DIAGNOSIS — I10 ESSENTIAL HYPERTENSION: ICD-10-CM

## 2019-07-16 DIAGNOSIS — H60.501 ACUTE OTITIS EXTERNA OF RIGHT EAR, UNSPECIFIED TYPE: Primary | ICD-10-CM

## 2019-07-16 RX ORDER — NEOMYCIN SULFATE, POLYMYXIN B SULFATE AND HYDROCORTISONE 10; 3.5; 1 MG/ML; MG/ML; [USP'U]/ML
3 SUSPENSION/ DROPS AURICULAR (OTIC) 4 TIMES DAILY
Qty: 10 ML | Refills: 0 | Status: SHIPPED | OUTPATIENT
Start: 2019-07-16 | End: 2019-07-26

## 2019-07-16 NOTE — PROGRESS NOTES
Tameka Hernandez is a 70 y.o. female      Chief Complaint   Patient presents with    Blood Pressure Check     f/u visit          1. Have you been to the ER, urgent care clinic since your last visit? Hospitalized since your last visit? no      2. Have you seen or consulted any other health care providers outside of the [address] [place] [address] since your last visit? Include any pap smears or colon screening.   no

## 2019-07-16 NOTE — PROGRESS NOTES
Assessment and Plan    1. Essential hypertension  Now at goal,  Continue ACE/diuretic, FU 6 months    2. Acute otitis externa of right ear, unspecified type  - neomycin-polymyxin-hydrocortisone, buffered, (PEDIOTIC) 3.5-10,000-1 mg/mL-unit/mL-% otic suspension; Administer 3 Drops in right ear four (4) times daily for 10 days. Dispense: 10 mL; Refill: 0    3. Dermatofibroma  Benign nature discussed. Try not to scratch. FU for enlargement or if desires removal.    Follow-up and Dispositions    · Return in about 6 months (around 1/16/2020) for Blood pressure follow up, Medication follow up. Diagnosis and plan discussed with patient who verbillized understanding. History of present Myrna Arguello is a 70 y.o. female presenting for Blood Pressure Check (f/u visit )    Now on enalapril hct,  Tolerating well  BP at goal  No side effects    Itching and pain right ear  Since trip to [facility]    Lesion on post neck,   Scratched scab off    Review of Systems   Constitutional: Positive for weight loss. Negative for chills and fever. HENT: Positive for ear pain. Negative for congestion, sinus pain and sore throat. Respiratory: Negative. Cardiovascular: Negative. Gastrointestinal: Negative. Genitourinary: Negative.           Past Medical History:   Diagnosis Date    Cataract     Diabetes ([address]     Type 2 diet only     Diverticulosis     Hemorrhoids     Large carpet -like rectal polyps  seen on coloscopy  2015    Hyperlipidemia     Hypertension     Onychomycosis     Onychomycosis     Sciatica     Transient global amnesia     Transient global amnesia     h/o      Past Surgical History:   Procedure Laterality Date    HX COLONOSCOPY  2014 12/2014: ext hemorrhoids, diverticulosis, carpet-like polyp (resected by Dr. Анна Portillo 2/12/15) Repeat due 8/2018     HX HIP REPLACEMENT Right     HX HYSTERECTOMY  1996    HX KNEE REPLACEMENT Right     HX KNEE REPLACEMENT      left 2019 Family History   Problem Relation Age of Onset    Hypertension Mother     Stroke Mother     Diabetes Mother     Hypertension Maternal Grandmother     Stroke Maternal Grandmother      Social History     Socioeconomic History    Marital status:      Spouse name: Not on file    Number of children: Not on file    Years of education: Not on file    Highest education level: Not on file   Occupational History    Not on file   Social Needs    Financial resource strain: Not on file    Food insecurity:     Worry: Not on file     Inability: Not on file    Transportation needs:     Medical: Not on file     Non-medical: Not on file   Tobacco Use    Smoking status: Never Smoker    Smokeless tobacco: Never Used   Substance and Sexual Activity    Alcohol use: No     Frequency: Never    Drug use: No    Sexual activity: Yes   Lifestyle    Physical activity:     Days per week: Not on file     Minutes per session: Not on file    Stress: Not on file   Relationships    Social connections:     Talks on phone: Not on file     Gets together: Not on file     Attends Sabianist service: Not on file     Active member of club or organization: Not on file     Attends meetings of clubs or organizations: Not on file     Relationship status: Not on file    Intimate partner violence:     Fear of current or ex partner: Not on file     Emotionally abused: Not on file     Physically abused: Not on file     Forced sexual activity: Not on file   Other Topics Concern    Not on file   Social History Narrative    Not on file         Current Outpatient Medications   Medication Sig Dispense Refill    neomycin-polymyxin-hydrocortisone, buffered, (PEDIOTIC) 3.5-10,000-1 mg/mL-unit/mL-% otic suspension Administer 3 Drops in right ear four (4) times daily for 10 days. 10 mL 0    enalapril-hydroCHLOROthiazide (VASERETIC) 5-12.5 mg tablet Take 1 Tab by mouth daily.  90 Tab 1    docosahexanoic acid/epa (FISH OIL PO) Take  by mouth.      MULTIVITAMIN PO Take  by mouth.  rosuvastatin (CRESTOR) 5 mg tablet Take 1 Tab by mouth daily. 90 Tab 1    calcium carbonate-vitamin d2 500 mg(1,250mg) -200 unit tab Take  by mouth.  aspirin delayed-release 81 mg tablet Take  by mouth daily. Allergies   Allergen Reactions    Penicillins Unknown (comments) and Swelling     None noted        Vitals:    07/16/19 0935   BP: 121/74   Pulse: 83   Resp: 16   Temp: 98.6 °F (37 °C)   TempSrc: Oral   SpO2: 96%   Weight: 197 lb (89.4 kg)   Height: 5' 7\" (1.702 m)     Body mass index is 30.85 kg/m². Objective  General: Patient alert and oriented and in NAD  Eyes: PER/EOMI, no conjunctival pallor or scleral icterus. ENT: Nares normal, TM's clear with normal architecture and light reflex, canal not really inflammed  Neck: No thyromegaly or cervical lymphadenopathy  Cardiovascular: Heart has regular rate and rhythm, No murmurs, rubs or gallops. No edema  Respiratory: Lungs are clear to auscultation bilaterally, no wheezing, rales or rhonchi, normal chest excursion and no increased work of breathing. Musculoskeletal: All four extremities present and functional.   Skin: 7-8 mm lesion, darkly pigmented, no distinct borders, most c/w dermatofibroma  Neuro: AAOx3, normal gait and speech. No gross neurologic deficits. Psych: Appropriate mood and affect, no homicidal or suicidal ideation, no obsessions, delusions or hallucinations, normal psychomotor status.

## 2019-09-19 DIAGNOSIS — E78.00 HYPERCHOLESTEREMIA: ICD-10-CM

## 2019-09-19 RX ORDER — ROSUVASTATIN CALCIUM 5 MG/1
5 TABLET, COATED ORAL DAILY
Qty: 90 TAB | Refills: 1 | Status: SHIPPED | OUTPATIENT
Start: 2019-09-19 | End: 2019-10-01 | Stop reason: SDUPTHER

## 2019-10-01 DIAGNOSIS — E78.00 HYPERCHOLESTEREMIA: ICD-10-CM

## 2019-10-01 RX ORDER — ROSUVASTATIN CALCIUM 5 MG/1
5 TABLET, COATED ORAL DAILY
Qty: 90 TAB | Refills: 1 | Status: SHIPPED | OUTPATIENT
Start: 2019-10-01 | End: 2020-03-18 | Stop reason: SDUPTHER

## 2019-10-01 NOTE — TELEPHONE ENCOUNTER
Pt states medication was sent to [facility] but she gets her medication through [facility].  Requesting crestor sent to [facility]    Dr Jaciel Choi on vacation

## 2019-10-16 ENCOUNTER — OFFICE VISIT (OUTPATIENT)
Dept: FAMILY MEDICINE CLINIC | Age: 79
End: 2019-10-16

## 2019-10-16 VITALS
HEART RATE: 90 BPM | SYSTOLIC BLOOD PRESSURE: 143 MMHG | BODY MASS INDEX: 31.39 KG/M2 | TEMPERATURE: 98.3 F | WEIGHT: 200 LBS | DIASTOLIC BLOOD PRESSURE: 78 MMHG | HEIGHT: 67 IN | OXYGEN SATURATION: 9 % | RESPIRATION RATE: 18 BRPM

## 2019-10-16 DIAGNOSIS — E11.9 TYPE 2 DIABETES MELLITUS WITHOUT COMPLICATION, WITHOUT LONG-TERM CURRENT USE OF INSULIN (HCC): Primary | ICD-10-CM

## 2019-10-16 DIAGNOSIS — I10 ESSENTIAL HYPERTENSION: ICD-10-CM

## 2019-10-16 DIAGNOSIS — E78.00 HYPERCHOLESTEREMIA: ICD-10-CM

## 2019-10-16 DIAGNOSIS — Z23 ENCOUNTER FOR IMMUNIZATION: ICD-10-CM

## 2019-10-16 RX ORDER — ENALAPRIL MALEATE AND HYDROCHLOROTHIAZIDE 5; 12.5 MG/1; MG/1
1 TABLET ORAL DAILY
Qty: 90 TAB | Refills: 1 | Status: SHIPPED | OUTPATIENT
Start: 2019-10-16 | End: 2019-10-16 | Stop reason: SDUPTHER

## 2019-10-16 RX ORDER — ENALAPRIL MALEATE AND HYDROCHLOROTHIAZIDE 5; 12.5 MG/1; MG/1
1 TABLET ORAL DAILY
Qty: 90 TAB | Refills: 1 | Status: SHIPPED | OUTPATIENT
Start: 2019-10-16 | End: 2020-03-18 | Stop reason: SDUPTHER

## 2019-10-16 NOTE — TELEPHONE ENCOUNTER
Pt stated she would like this medication sent to [facility]

## 2019-10-16 NOTE — PATIENT INSTRUCTIONS
Diabetes:  Blood sugar goals:  Hemoglobin A1c under 7  Fasting blood sugar   Blood sugar 2 hours after a meal under 180, 4 hours after a meal under 120  No hypoglycemia (sugars under 70 and symptomatic low sugars)    Blood sugar control with diet and exercise:  Exercise 45 minutes per day. This makes your insulin work better. It also allows insulin levels to fall helping with weight loss. Every night, try to fast from your evening meal to breakfast (at least 12 hours) without eating anything. This uses stored energy in your liver and makes insulin work better. Avoid simples sugars such as table sugar in drinks (sodas, lemonade, sweet tea, wine), desserts, candy. Also avoid fruit juices and high fructose corn syrup. Avoid frequent consumption of fruit especially grapes and bananas. A single serving of fruit daily is all you should have. Finally, drink less milk which has milk sugar in it. Control your starch intake. Men should have 3-4 carb portions per meal.  Women should have 2-3 carb portions per meal.  A carb serving is the equivalent of a slice of bread. Control your blood pressure and cholesterol. These problems are common in diabetes. AND, don't smoke. All of these problems contribute to heart disease and stroke risk. Get a yearly eye exam and flu shot. Make sure your vaccines are up to date particularly the pneumonia vaccines. Inspect your feet daily. Wear comfortable protective shoes and clean socks. Seek medical care if there are sore, calluses or numbness and burning of your feet. See your doctor at least every 6 months if you are on oral medicines or more often if the diabetic control is not at goal or if you are on insulin. Take your medicines religiously. STOP the SUGAR    The first step in dietary efforts at weight loss is removing as much sugar from the diet as possible.   Most dietary sugar is in the forms of table sugar (sucrose), fruit sugar (fructose) and milk sugar (lactose). But, as the picture above demonstrates, you need to watch labels to see if processed foods have added sugars under other names. To eliminate sucrose, eliminate sweet drinks entirely including soft drinks, sports drinks, lemonade, sweet tea and wine. Also, eliminate candy and make desserts a \"special occasion only treat\". Don't eat desserts with every meal or every night. Most fructose is found in fruit and this should be markedly limited. Fruit juice should be avoided. One piece of fruit daily is the limit. Berries are a good choice to eat as a garnish for other foods. Bananas and grapes should be avoided. Avoid high fructose corn syrup (an artificial sweetener). Milk sugar, or lactose, should be avoided as well. Milk is a good source of calcium but not for people struggling with weight issues. Put a little milk in your coffee or tea but otherwise avoid milk. How can you avoid sugar? For starters, don't buy it and don't bring it in the house. It won't tempt you that way! For more information:    Try the internet for videos about sugar addiction or try diet doctor.com. Carb Counting in Diabetes    Carbohydrate or carb counting is a method of calculating grams of carbohydrate consumed at meals and snacks. Foods that contain carbohydrate have the greatest effect on blood sugar compared to foods that contain protein or fat. A low carb diet has the greatest likelihood of promoting weight loss. What Foods Have Carbohydrate?   Foods that contain carbohydrate or carbs are:    -grains like wheat, rice, oatmeal, and barley  -grain-based foods like bread, cereal, pasta, and crackers  -starchy vegetables like potatoes, peas and corn  -fruit and juice  -milk and yogurt  -dried beans and peas and soy products like veggie burgers  -sweets and snack foods like sodas, juice drinks, cake, cookies, candy, and chips    Non-starchy vegetables like lettuce, cucumbers, broccoli, and cauliflower have very little carbohydrate and minimal impact on your blood glucose. Carbohydrate Servings    -In meal planning, 1 serving of a food with carbohydrate has about  15 grams of carbohydrate:  -Check serving sizes with measuring cups and spoons or a food scale. -Read the Nutrition Facts on food labels to find out how many grams of carbohydrate are in foods you eat.   -1 carb serving (15 grams) is roughly equal to one piece of bread    How much carbohydrate should I eat? Diabetics are advised to eat: For women-30-45 grams or 2-3 carb servings per meal.  For men-45-60 grams or 3-4 carb servings per meal.    For weight loss, patients should try to eat under 100 grams of carbs per day. How do I \"manage\" carbs?    -First, eliminate sugar in the form of soft drinks, candy and desserts. Minimize cakes, cookies, smoothies and juices.  -Next, identify the carbs you are eating. Watch labels and know when you are eating a starch. Eat less bread, noodles, pasta, rice, potatoes, french fries, cereals, chips, crackers and yogurt  -Eat more healthy proteins and fats with more eggs, full fat dairy products, non starchy vegetables, salads, meat, poultry, fish, cheese, berries, nuts, olive oil and butter.  -Avoid beer. Europeans refer to beer as \"liquid bread\" and it is made from grains with a high carb content. Where can I find more information? There is a lot of information about carb counting on the internet and there are books about carb counting. Try the American Diabetes Association and [email or web address]

## 2019-10-16 NOTE — PROGRESS NOTES
Assessment and Plan    1. Essential hypertension  Borderline BP today, recheck with nurses  - METABOLIC PANEL, BASIC  - MICROALBUMIN, UR, RAND W/ MICROALB/CREAT RATIO  - enalapril-hydroCHLOROthiazide (VASERETIC) 5-12.5 mg tablet; Take 1 Tab by mouth daily. Dispense: 90 Tab; Refill: 1    2. Type 2 diabetes mellitus without complication, without long-term current use of insulin (HCC)  Diet only, reviewed  - CBC WITH AUTOMATED DIFF  - HEMOGLOBIN A1C WITH EAG  -  DIABETES FOOT EXAM    3. Hypercholesteremia  Continue statin  - LIPID PANEL  - HEPATIC FUNCTION PANEL    4. Encounter for immunization  Flu shot and needs TDAP  - diph,Pertuss,Acell,,Tet Vac-PF (ADACEL) 2 Lf-(2.5-5-3-5 mcg)-5Lf/0.5 mL susp; 0.5 mL by IntraMUSCular route once for 1 dose. Dispense: 1 Syringe; Refill: 0  - INFLUENZA VIRUS VACCINE, HIGH DOSE SEASONAL, PRESERVATIVE FREE      Follow-up and Dispositions    · Return in about 1 month (around 11/16/2019). Diagnosis and plan discussed with patient who verbillized understanding. History of present Ildefonso Hart is a 70 y.o. female presenting for Hypertension (6 month f/u visit )    Hypertension   Taking meds as prescribed  Not checking BP's  Gets out of breath if walks a long way    DM2, diet only  Not really following diet  Eye exam up to date  Feet OK    Hypercholesterolemia  On statin without problems        Review of Systems   Respiratory: Negative for shortness of breath. Cardiovascular: Negative for chest pain and palpitations. Gastrointestinal: Negative. Negative for heartburn. Genitourinary: Negative.           Past Medical History:   Diagnosis Date    Cataract     Diabetes ([address]     Type 2 diet only     Diverticulosis     Hemorrhoids     Large carpet -like rectal polyps  seen on coloscopy  2015    Hyperlipidemia     Hypertension     Onychomycosis     Onychomycosis     Sciatica     Transient global amnesia     Transient global amnesia     h/o      Past Surgical History:   Procedure Laterality Date    HX COLONOSCOPY  2014 12/2014: ext hemorrhoids, diverticulosis, carpet-like polyp (resected by Dr. Jena Camargo 2/12/15) Repeat due 8/2018     HX HIP REPLACEMENT Right     HX HYSTERECTOMY  1996    HX KNEE REPLACEMENT Right     HX KNEE REPLACEMENT      left 2019      Family History   Problem Relation Age of Onset    Hypertension Mother     Stroke Mother     Diabetes Mother     Hypertension Maternal Grandmother     Stroke Maternal Grandmother      Social History     Socioeconomic History    Marital status:      Spouse name: Not on file    Number of children: Not on file    Years of education: Not on file    Highest education level: Not on file   Occupational History    Not on file   Social Needs    Financial resource strain: Not on file    Food insecurity:     Worry: Not on file     Inability: Not on file    Transportation needs:     Medical: Not on file     Non-medical: Not on file   Tobacco Use    Smoking status: Never Smoker    Smokeless tobacco: Never Used   Substance and Sexual Activity    Alcohol use: No     Frequency: Never    Drug use: No    Sexual activity: Yes   Lifestyle    Physical activity:     Days per week: Not on file     Minutes per session: Not on file    Stress: Not on file   Relationships    Social connections:     Talks on phone: Not on file     Gets together: Not on file     Attends [employer] service: Not on file     Active member of club or organization: Not on file     Attends meetings of clubs or organizations: Not on file     Relationship status: Not on file    Intimate partner violence:     Fear of current or ex partner: Not on file     Emotionally abused: Not on file     Physically abused: Not on file     Forced sexual activity: Not on file   Other Topics Concern    Not on file   Social History Narrative    Not on file         Current Outpatient Medications   Medication Sig Dispense Refill    enalapril-hydroCHLOROthiazide (VASERETIC) 5-12.5 mg tablet Take 1 Tab by mouth daily. 90 Tab 1    diph,Pertuss,Acell,,Tet Vac-PF (ADACEL) 2 Lf-(2.5-5-3-5 mcg)-5Lf/0.5 mL susp 0.5 mL by IntraMUSCular route once for 1 dose. 1 Syringe 0    rosuvastatin (CRESTOR) 5 mg tablet Take 1 Tab by mouth daily. 90 Tab 1    docosahexanoic acid/epa (FISH OIL PO) Take  by mouth.  MULTIVITAMIN PO Take  by mouth.  calcium carbonate-vitamin d2 500 mg(1,250mg) -200 unit tab Take  by mouth.  aspirin delayed-release 81 mg tablet Take  by mouth daily. Allergies   Allergen Reactions    Penicillins Unknown (comments) and Swelling     None noted        Vitals:    10/16/19 0953   BP: 143/78   Pulse: 90   Resp: 18   Temp: 98.3 °F (36.8 °C)   TempSrc: Oral   SpO2: (!) 9%   Weight: 200 lb (90.7 kg)   Height: 5' 7\" (1.702 m)     Body mass index is 31.32 kg/m². Objective  Physical Exam   Constitutional: She is oriented to person, place, and time. She appears well-developed and well-nourished. No distress. Eyes: Conjunctivae are normal.   Neck: Neck supple. No thyromegaly present. Cardiovascular: Normal rate, regular rhythm and normal heart sounds. Exam reveals no gallop and no friction rub. No murmur heard. Pulmonary/Chest: Effort normal and breath sounds normal. No respiratory distress. She has no wheezes. She has no rales. Musculoskeletal: She exhibits no edema. Lymphadenopathy:     She has no cervical adenopathy. Neurological: She is alert and oriented to person, place, and time. Skin: She is not diaphoretic. Psychiatric: She has a normal mood and affect. Her behavior is normal. Judgment and thought content normal.   Nursing note and vitals reviewed.       Diabetic Foot Exam:  Protective sensation is intact bilaterally. Pedal pulses are 2+ and normal bilaterally.   L foot skin inspection:  normal skin and soft tissue with no gross edema or evidence of acute injury or foot ulcer  R foot skin inspection:  skin and soft tissue appear normal with no significant edema or evidence of acute injury or foot ulcer

## 2019-10-17 LAB
ALBUMIN SERPL-MCNC: 4.6 G/DL (ref 3.5–4.8)
ALBUMIN/CREAT UR: <4.3 MG/G CREAT (ref 0–30)
ALP SERPL-CCNC: 80 IU/L (ref 39–117)
ALT SERPL-CCNC: 27 IU/L (ref 0–32)
AST SERPL-CCNC: 39 IU/L (ref 0–40)
BASOPHILS # BLD AUTO: 0.1 X10E3/UL (ref 0–0.2)
BASOPHILS NFR BLD AUTO: 1 %
BILIRUB DIRECT SERPL-MCNC: 0.14 MG/DL (ref 0–0.4)
BILIRUB SERPL-MCNC: 0.4 MG/DL (ref 0–1.2)
BUN SERPL-MCNC: 14 MG/DL (ref 8–27)
BUN/CREAT SERPL: 19 (ref 12–28)
CALCIUM SERPL-MCNC: 10.2 MG/DL (ref 8.7–10.3)
CHLORIDE SERPL-SCNC: 100 MMOL/L (ref 96–106)
CHOLEST SERPL-MCNC: 190 MG/DL (ref 100–199)
CO2 SERPL-SCNC: 25 MMOL/L (ref 20–29)
CREAT SERPL-MCNC: 0.72 MG/DL (ref 0.57–1)
CREAT UR-MCNC: 69.5 MG/DL
EOSINOPHIL # BLD AUTO: 0.2 X10E3/UL (ref 0–0.4)
EOSINOPHIL NFR BLD AUTO: 3 %
ERYTHROCYTE [DISTWIDTH] IN BLOOD BY AUTOMATED COUNT: 15.9 % (ref 12.3–15.4)
EST. AVERAGE GLUCOSE BLD GHB EST-MCNC: 140 MG/DL
GLUCOSE SERPL-MCNC: 96 MG/DL (ref 65–99)
HBA1C MFR BLD: 6.5 % (ref 4.8–5.6)
HCT VFR BLD AUTO: 43.6 % (ref 34–46.6)
HDLC SERPL-MCNC: 79 MG/DL
HGB BLD-MCNC: 14.2 G/DL (ref 11.1–15.9)
IMM GRANULOCYTES # BLD AUTO: 0 X10E3/UL (ref 0–0.1)
IMM GRANULOCYTES NFR BLD AUTO: 0 %
LDLC SERPL CALC-MCNC: 96 MG/DL (ref 0–99)
LYMPHOCYTES # BLD AUTO: 2.1 X10E3/UL (ref 0.7–3.1)
LYMPHOCYTES NFR BLD AUTO: 35 %
MCH RBC QN AUTO: 24.9 PG (ref 26.6–33)
MCHC RBC AUTO-ENTMCNC: 32.6 G/DL (ref 31.5–35.7)
MCV RBC AUTO: 77 FL (ref 79–97)
MICROALBUMIN UR-MCNC: <3 UG/ML
MONOCYTES # BLD AUTO: 0.5 X10E3/UL (ref 0.1–0.9)
MONOCYTES NFR BLD AUTO: 8 %
NEUTROPHILS # BLD AUTO: 3.1 X10E3/UL (ref 1.4–7)
NEUTROPHILS NFR BLD AUTO: 53 %
PLATELET # BLD AUTO: 296 X10E3/UL (ref 150–450)
POTASSIUM SERPL-SCNC: 4.1 MMOL/L (ref 3.5–5.2)
PROT SERPL-MCNC: 7.8 G/DL (ref 6–8.5)
RBC # BLD AUTO: 5.7 X10E6/UL (ref 3.77–5.28)
SODIUM SERPL-SCNC: 143 MMOL/L (ref 134–144)
TRIGL SERPL-MCNC: 75 MG/DL (ref 0–149)
VLDLC SERPL CALC-MCNC: 15 MG/DL (ref 5–40)
WBC # BLD AUTO: 5.9 X10E3/UL (ref 3.4–10.8)

## 2020-03-18 DIAGNOSIS — E78.00 HYPERCHOLESTEREMIA: ICD-10-CM

## 2020-03-18 DIAGNOSIS — I10 ESSENTIAL HYPERTENSION: ICD-10-CM

## 2020-03-18 RX ORDER — ENALAPRIL MALEATE AND HYDROCHLOROTHIAZIDE 5; 12.5 MG/1; MG/1
1 TABLET ORAL DAILY
Qty: 90 TAB | Refills: 1 | Status: SHIPPED | OUTPATIENT
Start: 2020-03-18 | End: 2020-07-30

## 2020-03-18 RX ORDER — ROSUVASTATIN CALCIUM 5 MG/1
5 TABLET, COATED ORAL DAILY
Qty: 90 TAB | Refills: 1 | Status: SHIPPED | OUTPATIENT
Start: 2020-03-18 | End: 2020-07-30

## 2020-07-01 ENCOUNTER — OFFICE VISIT (OUTPATIENT)
Dept: FAMILY MEDICINE CLINIC | Age: 80
End: 2020-07-01

## 2020-07-01 ENCOUNTER — HOSPITAL ENCOUNTER (OUTPATIENT)
Dept: LAB | Age: 80
Discharge: HOME | End: 2020-07-01

## 2020-07-01 VITALS
HEIGHT: 67 IN | TEMPERATURE: 97.7 F | DIASTOLIC BLOOD PRESSURE: 83 MMHG | HEART RATE: 80 BPM | SYSTOLIC BLOOD PRESSURE: 137 MMHG | BODY MASS INDEX: 29.82 KG/M2 | RESPIRATION RATE: 16 BRPM | OXYGEN SATURATION: 96 % | WEIGHT: 190 LBS

## 2020-07-01 DIAGNOSIS — R20.0 NUMBNESS AND TINGLING IN BOTH HANDS: Primary | ICD-10-CM

## 2020-07-01 DIAGNOSIS — I10 ESSENTIAL HYPERTENSION: ICD-10-CM

## 2020-07-01 DIAGNOSIS — R20.2 NUMBNESS AND TINGLING IN BOTH HANDS: Primary | ICD-10-CM

## 2020-07-01 DIAGNOSIS — E78.00 HYPERCHOLESTEREMIA: ICD-10-CM

## 2020-07-01 DIAGNOSIS — E11.9 TYPE 2 DIABETES MELLITUS WITHOUT COMPLICATION, WITHOUT LONG-TERM CURRENT USE OF INSULIN (HCC): ICD-10-CM

## 2020-07-01 DIAGNOSIS — E53.8 VITAMIN B12 DEFICIENCY: ICD-10-CM

## 2020-07-01 LAB
ALBUMIN SERPL-MCNC: 4.1 G/DL (ref 3.5–5)
ALBUMIN/GLOB SERPL: 1.1 {RATIO} (ref 1.1–2.2)
ALP SERPL-CCNC: 74 U/L (ref 45–117)
ALT SERPL-CCNC: 29 U/L (ref 12–78)
ANION GAP SERPL CALC-SCNC: 4 MMOL/L (ref 5–15)
AST SERPL-CCNC: 21 U/L (ref 15–37)
BASOPHILS # BLD: 0 K/UL (ref 0–0.1)
BASOPHILS NFR BLD: 0 % (ref 0–1)
BILIRUB DIRECT SERPL-MCNC: 0.2 MG/DL (ref 0–0.2)
BILIRUB SERPL-MCNC: 0.5 MG/DL (ref 0.2–1)
BUN SERPL-MCNC: 14 MG/DL (ref 6–20)
BUN/CREAT SERPL: 23 (ref 12–20)
CALCIUM SERPL-MCNC: 9.6 MG/DL (ref 8.5–10.1)
CHLORIDE SERPL-SCNC: 105 MMOL/L (ref 97–108)
CHOLEST SERPL-MCNC: 209 MG/DL
CO2 SERPL-SCNC: 31 MMOL/L (ref 21–32)
CREAT SERPL-MCNC: 0.62 MG/DL (ref 0.55–1.02)
CREAT UR-MCNC: 126 MG/DL
DIFFERENTIAL METHOD BLD: ABNORMAL
EOSINOPHIL # BLD: 0.1 K/UL (ref 0–0.4)
EOSINOPHIL NFR BLD: 3 % (ref 0–7)
ERYTHROCYTE [DISTWIDTH] IN BLOOD BY AUTOMATED COUNT: 16.6 % (ref 11.5–14.5)
EST. AVERAGE GLUCOSE BLD GHB EST-MCNC: 131 MG/DL
GLOBULIN SER CALC-MCNC: 3.7 G/DL (ref 2–4)
GLUCOSE SERPL-MCNC: 96 MG/DL (ref 65–100)
HBA1C MFR BLD: 6.2 % (ref 4–5.6)
HCT VFR BLD AUTO: 49.5 % (ref 35–47)
HDLC SERPL-MCNC: 79 MG/DL
HDLC SERPL: 2.6 {RATIO} (ref 0–5)
HGB BLD-MCNC: 14.9 G/DL (ref 11.5–16)
IMM GRANULOCYTES # BLD AUTO: 0 K/UL (ref 0–0.04)
IMM GRANULOCYTES NFR BLD AUTO: 0 % (ref 0–0.5)
LDLC SERPL CALC-MCNC: 116.2 MG/DL (ref 0–100)
LIPID PROFILE,FLP: ABNORMAL
LYMPHOCYTES # BLD: 1.9 K/UL (ref 0.8–3.5)
LYMPHOCYTES NFR BLD: 43 % (ref 12–49)
MAGNESIUM SERPL-MCNC: 2.3 MG/DL (ref 1.6–2.4)
MCH RBC QN AUTO: 25.9 PG (ref 26–34)
MCHC RBC AUTO-ENTMCNC: 30.1 G/DL (ref 30–36.5)
MCV RBC AUTO: 85.9 FL (ref 80–99)
MICROALBUMIN UR-MCNC: 0.73 MG/DL
MICROALBUMIN/CREAT UR-RTO: 6 MG/G (ref 0–30)
MONOCYTES # BLD: 0.3 K/UL (ref 0–1)
MONOCYTES NFR BLD: 7 % (ref 5–13)
NEUTS SEG # BLD: 2.1 K/UL (ref 1.8–8)
NEUTS SEG NFR BLD: 47 % (ref 32–75)
NRBC # BLD: 0 K/UL (ref 0–0.01)
NRBC BLD-RTO: 0 PER 100 WBC
PLATELET # BLD AUTO: 214 K/UL (ref 150–400)
PMV BLD AUTO: 11.7 FL (ref 8.9–12.9)
POTASSIUM SERPL-SCNC: 4.4 MMOL/L (ref 3.5–5.1)
PROT SERPL-MCNC: 7.8 G/DL (ref 6.4–8.2)
RBC # BLD AUTO: 5.76 M/UL (ref 3.8–5.2)
SODIUM SERPL-SCNC: 140 MMOL/L (ref 136–145)
TRIGL SERPL-MCNC: 69 MG/DL (ref ?–150)
VIT B12 SERPL-MCNC: 1325 PG/ML (ref 193–986)
VLDLC SERPL CALC-MCNC: 13.8 MG/DL
WBC # BLD AUTO: 4.5 K/UL (ref 3.6–11)

## 2020-07-01 NOTE — PATIENT INSTRUCTIONS
WRIST SPLINTS FOR CARPAL TUNNEL  [facility]    Diabetes:  Blood sugar goals:  Hemoglobin A1c under 7  Fasting blood sugar   Blood sugar 2 hours after a meal under 180, 4 hours after a meal under 120  No hypoglycemia (sugars under 70 and symptomatic low sugars)    Blood sugar control with diet and exercise:  Exercise 45 minutes per day. This makes your insulin work better. It also allows insulin levels to fall helping with weight loss. Every night, try to fast from your evening meal to breakfast (at least 12 hours) without eating anything. This uses stored energy in your liver and makes insulin work better. Avoid simples sugars such as table sugar in drinks (sodas, lemonade, sweet tea, wine), desserts, candy. Also avoid fruit juices and high fructose corn syrup. Avoid frequent consumption of fruit especially grapes and bananas. A single serving of fruit daily is all you should have. Finally, drink less milk which has milk sugar in it. Control your starch intake. Men should have 3-4 carb portions per meal.  Women should have 2-3 carb portions per meal.  A carb serving is the equivalent of a slice of bread. Control your blood pressure and cholesterol. These problems are common in diabetes. AND, don't smoke. All of these problems contribute to heart disease and stroke risk. Get a yearly eye exam and flu shot. Make sure your vaccines are up to date particularly the pneumonia vaccines. Inspect your feet daily. Wear comfortable protective shoes and clean socks. Seek medical care if there are sore, calluses or numbness and burning of your feet. See your doctor at least every 6 months if you are on oral medicines or more often if the diabetic control is not at goal or if you are on insulin. Take your medicines religiously. The goal blood pressure for most patients is 140/90.     The whole point of treating blood pressure is to prevent strokes, heart attacks and kidney damage. Persistently elevated blood pressure damages blood vessels and can lead to catastrophic heart problems and strokes. Recommendations for Hypertension (elevated blood pressure):    · Purchase a blood pressure cuff that goes around your upper arm and check blood pressure at least 3 times per week. Write down your numbers for review. Check blood pressure in the morning and evening. Rest for 5 minutes with feet elevated and arm resting on a table (at mid-chest level) when checking blood pressure    · Exercise 30-60 minutes most days of the week, preferably with a mix of cardiovascular and strength training. Exercise can improve blood pressure, even if you do not lose weight! · Limit alcohol intake to less than 3-4 drinks per week. · Avoid tobacco products    · Avoid illegal drugs especially amphetamines  · DASH diet - includes fruits, vegetables, fiber, and less than 2000 mg sodium (salt) daily. · Try to eat a low sugar diet. Sugar worsens diabetes and activates kidney hormones that raise blood pressure. · Avoid non-steroidal inflammatory medications (NSAIDS) such as ibuprofen, advil, motrin, aleve, and naproxyn as these may increase blood pressure if used long-term    · Avoid OTC decongestants such as pseudopherine, phenylephrine, Afrin  · Take your blood pressure medicine (and aspirin if prescribed) religiously            STOP the SUGAR    The first step in dietary efforts at weight loss is removing as much sugar from the diet as possible. Most dietary sugar is in the forms of table sugar (sucrose), fruit sugar (fructose) and milk sugar (lactose). But, as the picture above demonstrates, you need to watch labels to see if processed foods have added sugars under other names. To eliminate sucrose, eliminate sweet drinks entirely including soft drinks, sports drinks, lemonade, sweet tea and wine. Also, eliminate candy and make desserts a \"special occasion only treat\".   Don't eat desserts with every meal or every night. Most fructose is found in fruit and this should be markedly limited. Fruit juice should be avoided. One piece of fruit daily is the limit. Berries are a good choice to eat as a garnish for other foods. Bananas and grapes should be avoided. Avoid high fructose corn syrup (an artificial sweetener). Milk sugar, or lactose, should be avoided as well. Milk is a good source of calcium but not for people struggling with weight issues. Put a little milk in your coffee or tea but otherwise avoid milk. How can you avoid sugar? For starters, don't buy it and don't bring it in the house. It won't tempt you that way!     For more information:    Try the internet for videos about sugar addiction or try [email or web address].

## 2020-07-01 NOTE — PROGRESS NOTES
Assessment and Plan    1. Hypercholesteremia  Continue statin  - HEPATIC FUNCTION PANEL; Future  - LIPID PANEL; Future    2. Essential hypertension  Same meds  - METABOLIC PANEL, BASIC; Future  - MICROALBUMIN, UR, RAND W/ MICROALB/CREAT RATIO; Future  - MAGNESIUM; Future    3. Type 2 diabetes mellitus without complication, without long-term current use of insulin (HCC)  Stick to diet. Doing well  - CBC WITH AUTOMATED DIFF; Future  - HEMOGLOBIN A1C WITH EAG; Future    4. Vitamin B12 deficiency  With numbness, check B12 and Mag++  - VITAMIN B12; Future    5. Numbness and tingling in both hands  Likely carpal tunnel, wrist splints for two weeks. FU if no relief. Follow-up and Dispositions    · Return in about 6 months (around 1/1/2021) for Blood pressure follow up, Diabetes follow up. Diagnosis and plan discussed with patient who verbillized understanding. History of present Hortencia Huertas is a 68 y.o. female presenting for Numbness (bilateral hand numbness since March )    Numbness both hand   L>R  Started in March. No falls or injuries  No new chronic activities. Not painful. Numb all day every day. No neck pain. No Headache. No weakness  No numbness in feet. Hypertension  At goal  Takes meds consistently    DM2  Diet only  Has lost 10 pounds. Hypercholesterolemia  On statin. Review of Systems   Constitutional: Negative for chills and fever. Respiratory: Negative for shortness of breath. Cardiovascular: Negative for chest pain. Gastrointestinal: Negative. Genitourinary: Negative. Neurological: Positive for sensory change. Negative for tingling, focal weakness and weakness. Psychiatric/Behavioral: Negative.           Past Medical History:   Diagnosis Date    Cataract     Diabetes ([facility] [address]     Type 2 diet only     Diverticulosis     Hemorrhoids     Large carpet -like rectal polyps  seen on coloscopy  2015    Hyperlipidemia     Hypertension     Onychomycosis  Onychomycosis     Sciatica     Transient global amnesia     Transient global amnesia     h/o      Past Surgical History:   Procedure Laterality Date    HX COLONOSCOPY  2014 12/2014: ext hemorrhoids, diverticulosis, carpet-like polyp (resected by Dr. Attila Portillo 2/12/15) Repeat due 8/2018     HX HIP REPLACEMENT Right     HX HYSTERECTOMY  1996    HX KNEE REPLACEMENT Right     HX KNEE REPLACEMENT      left 2019      Family History   Problem Relation Age of Onset    Hypertension Mother     Stroke Mother     Diabetes Mother     Hypertension Maternal Grandmother     Stroke Maternal Grandmother      Social History     Socioeconomic History    Marital status:      Spouse name: Not on file    Number of children: Not on file    Years of education: Not on file    Highest education level: Not on file   Occupational History    Not on file   Social Needs    Financial resource strain: Not on file    Food insecurity     Worry: Not on file     Inability: Not on file    Transportation needs     Medical: Not on file     Non-medical: Not on file   Tobacco Use    Smoking status: Never Smoker    Smokeless tobacco: Never Used   Substance and Sexual Activity    Alcohol use: No     Frequency: Never    Drug use: No    Sexual activity: Yes   Lifestyle    Physical activity     Days per week: Not on file     Minutes per session: Not on file    Stress: Not on file   Relationships    Social connections     Talks on phone: Not on file     Gets together: Not on file     Attends [employer] service: Not on file     Active member of club or organization: Not on file     Attends meetings of clubs or organizations: Not on file     Relationship status: Not on file    Intimate partner violence     Fear of current or ex partner: Not on file     Emotionally abused: Not on file     Physically abused: Not on file     Forced sexual activity: Not on file   Other Topics Concern    Not on file   Social History Narrative  Not on file         Prior to Admission medications    Medication Sig Start Date End Date Taking? Authorizing Provider   rosuvastatin (CRESTOR) 5 mg tablet Take 1 Tab by mouth daily. 3/18/20  Yes Chuck Becker MD   enalapril-hydroCHLOROthiazide (VASERETIC) 5-12.5 mg tablet Take 1 Tab by mouth daily. 3/18/20  Yes Chuck Becker MD   docosahexanoic acid/epa (FISH OIL PO) Take  by mouth. Yes Provider, Historical   MULTIVITAMIN PO Take  by mouth. Yes Provider, Historical   calcium carbonate-vitamin d2 500 mg(1,250mg) -200 unit tab Take  by mouth. Yes Provider, Historical   aspirin delayed-release 81 mg tablet Take  by mouth daily. Yes Provider, Historical        Allergies   Allergen Reactions    Penicillins Unknown (comments) and Swelling     None noted        Vitals:    07/01/20 1132   BP: 137/83   Pulse: 80   Resp: 16   Temp: 97.7 °F (36.5 °C)   TempSrc: Oral   SpO2: 96%   Weight: 190 lb (86.2 kg)   Height: 5' 7\" (1.702 m)     Body mass index is 29.76 kg/m². Objective  Physical Exam  Vitals signs and nursing note reviewed. Constitutional:       Appearance: Normal appearance. She is not toxic-appearing. HENT:      Head: Normocephalic and atraumatic. Neck:      Musculoskeletal: No muscular tenderness. Cardiovascular:      Rate and Rhythm: Normal rate and regular rhythm. Heart sounds: Normal heart sounds. No murmur. No gallop. Pulmonary:      Effort: Pulmonary effort is normal. No respiratory distress. Breath sounds: Normal breath sounds. No wheezing, rhonchi or rales. Lymphadenopathy:      Cervical: No cervical adenopathy. Neurological:      Mental Status: She is alert. Psychiatric:         Mood and Affect: Mood normal.         Behavior: Behavior normal.         Thought Content: Thought content normal.         Judgment: Judgment normal.       Both wrists negative Phalens. FROM. Normal vascular exam and no weakness throughout.   NECK examination:   Inspection: normal skin, soft tissue and bony appearance   Normal cervical lordosis; no gross edema or evidence of acute injury;   No previous surgical scars   Palpation: pain free to palpation  Neurovascular: normal sensory exam of C1 through T2 to light touch and pain;   Deep Tendon Reflexes: 2/4 bilat bicep;  2/4 bilat tricep;   Muscular Strength:  5/5 graded muscle strength of the intrinsic muscles of the cervical spine, the deltoid, bicep, tricep, wrist and finger flexors and extensors including the hand intrinsics;   Range of Motion: full active ROM with flexion and extension, left rotation, and right rotation; left and right lateral flexion

## 2020-07-01 NOTE — PROGRESS NOTES
Identified pt with two pt identifiers(name and ). Reviewed record in preparation for visit and have obtained necessary documentation. Chief Complaint   Patient presents with    Numbness     bilateral hand numbness since March         Health Maintenance Due   Topic    DTaP/Tdap/Td series (1 - Tdap)    Bone Densitometry (Dexa) Screening     Shingrix Vaccine Age 50> (2 of 2)       Coordination of Care Questionnaire:  :   1) Have you been to an emergency room, urgent care, or hospitalized since your last visit? If yes, where when, and reason for visit? No       2. Have seen or consulted any other health care provider since your last visit? If yes, where when, and reason for visit?   No       Mammogram 2020

## 2020-07-02 NOTE — PROGRESS NOTES
Your blood work is all fine. The cholesterol is well controlled and so is the diabetes. Give the splints a try and see me in 2-3 weeks.   [facility]

## 2020-07-23 ENCOUNTER — OFFICE VISIT (OUTPATIENT)
Dept: FAMILY MEDICINE CLINIC | Age: 80
End: 2020-07-23

## 2020-07-23 ENCOUNTER — HOSPITAL ENCOUNTER (OUTPATIENT)
Dept: LAB | Age: 80
Discharge: HOME | End: 2020-07-23

## 2020-07-23 VITALS
BODY MASS INDEX: 29.66 KG/M2 | DIASTOLIC BLOOD PRESSURE: 85 MMHG | RESPIRATION RATE: 16 BRPM | OXYGEN SATURATION: 97 % | HEART RATE: 86 BPM | TEMPERATURE: 97.8 F | SYSTOLIC BLOOD PRESSURE: 146 MMHG | WEIGHT: 189 LBS | HEIGHT: 67 IN

## 2020-07-23 DIAGNOSIS — R20.2 NUMBNESS AND TINGLING IN BOTH HANDS: ICD-10-CM

## 2020-07-23 DIAGNOSIS — R20.0 NUMBNESS AND TINGLING IN BOTH HANDS: Primary | ICD-10-CM

## 2020-07-23 DIAGNOSIS — R20.2 NUMBNESS AND TINGLING IN BOTH HANDS: Primary | ICD-10-CM

## 2020-07-23 DIAGNOSIS — R20.0 NUMBNESS AND TINGLING IN BOTH HANDS: ICD-10-CM

## 2020-07-23 LAB — TSH SERPL DL<=0.05 MIU/L-ACNC: 2.12 UIU/ML (ref 0.36–3.74)

## 2020-07-23 NOTE — PROGRESS NOTES
Identified pt with two pt identifiers(name and ). Reviewed record in preparation for visit and have obtained necessary documentation. No chief complaint on file. Health Maintenance Due   Topic    DTaP/Tdap/Td series (1 - Tdap)    Bone Densitometry (Dexa) Screening     Shingrix Vaccine Age 50> (2 of 2)       Coordination of Care Questionnaire:  :   1) Have you been to an emergency room, urgent care, or hospitalized since your last visit? If yes, where when, and reason for visit?  no       2. Have seen or consulted any other health care provider since your last visit?  If yes, where when, and reason for visit? no

## 2020-07-23 NOTE — PROGRESS NOTES
Assessment and Plan    1. Numbness and tingling in both hands  Glove distribution without lower extremity involvement. No better with splints for carpal tunnel. No clear etiology. Referral Neuro Dr. Ygnacio Galeazzi. - TSH 3RD GENERATION; Future  - XR SPINE CERV 4 OR 5 V; Future  - REFERRAL TO NEUROLOGY      Follow-up and Dispositions    · Return in about 1 month (around 8/23/2020) for follow up of numbness. Diagnosis and plan discussed with patient who verbillized understanding. History of present Jenelle Rivero is a 68 y.o. female presenting for numbness of hands    History of present Jenelle Rivero is a 68 y.o. female presenting for Numbness (bilateral hand numbness since March )    Numbness both hand   L>R  Started in March. No falls or injuries  No new chronic activities. Not painful. Numb all day every day. No neck pain. No Headache. No weakness  No numbness in feet. Both dorsal and palmar  No better with splints at all  Involves all finger  Like the hands have \"fallen asleep\"  No pins and needles. Feels puffy. Hair also falling out. Review of Systems   Musculoskeletal: Negative for falls and neck pain. Neurological: Positive for tingling and sensory change. Negative for focal weakness and weakness. Psychiatric/Behavioral: Negative.           Past Medical History:   Diagnosis Date    Cataract     Diabetes ([address]     Type 2 diet only     Diverticulosis     Hemorrhoids     Large carpet -like rectal polyps  seen on coloscopy  2015    Hyperlipidemia     Hypertension     Onychomycosis     Onychomycosis     Sciatica     Transient global amnesia     Transient global amnesia     h/o      Past Surgical History:   Procedure Laterality Date    HX COLONOSCOPY  2014 12/2014: ext hemorrhoids, diverticulosis, carpet-like polyp (resected by Dr. Emery Stratton 2/12/15) Repeat due 8/2018     HX HIP REPLACEMENT Right     HX HYSTERECTOMY  1996    HX KNEE REPLACEMENT Right     HX KNEE REPLACEMENT      left 2019      Family History   Problem Relation Age of Onset    Hypertension Mother     Stroke Mother     Diabetes Mother     Hypertension Maternal Grandmother     Stroke Maternal Grandmother      Social History     Socioeconomic History    Marital status:      Spouse name: Not on file    Number of children: Not on file    Years of education: Not on file    Highest education level: Not on file   Occupational History    Not on file   Social Needs    Financial resource strain: Not on file    Food insecurity     Worry: Not on file     Inability: Not on file   [employer] needs     Medical: Not on file     Non-medical: Not on file   Tobacco Use    Smoking status: Never Smoker    Smokeless tobacco: Never Used   Substance and Sexual Activity    Alcohol use: No     Frequency: Never    Drug use: No    Sexual activity: Yes   Lifestyle    Physical activity     Days per week: Not on file     Minutes per session: Not on file    Stress: Not on file   Relationships    Social connections     Talks on phone: Not on file     Gets together: Not on file     Attends Alevism service: Not on file     Active member of club or organization: Not on file     Attends meetings of clubs or organizations: Not on file     Relationship status: Not on file    Intimate partner violence     Fear of current or ex partner: Not on file     Emotionally abused: Not on file     Physically abused: Not on file     Forced sexual activity: Not on file   Other Topics Concern    Not on file   Social History Narrative    Not on file         Prior to Admission medications    Medication Sig Start Date End Date Taking? Authorizing Provider   rosuvastatin (CRESTOR) 5 mg tablet Take 1 Tab by mouth daily. 3/18/20  Yes Jimy Baker MD   enalapril-hydroCHLOROthiazide (VASERETIC) 5-12.5 mg tablet Take 1 Tab by mouth daily.  3/18/20  Yes Jimy Baker MD   docosahexanoic acid/epa (FISH OIL PO) Take  by mouth.   Yes Provider, Historical   MULTIVITAMIN PO Take  by mouth. Yes Provider, Historical   calcium carbonate-vitamin d2 500 mg(1,250mg) -200 unit tab Take  by mouth. Yes Provider, Historical   aspirin delayed-release 81 mg tablet Take  by mouth daily. Yes Provider, Historical        Allergies   Allergen Reactions    Penicillins Unknown (comments) and Swelling     None noted        Vitals:    07/23/20 0914   BP: 146/85   Pulse: 86   Resp: 16   Temp: 97.8 °F (36.6 °C)   TempSrc: Oral   SpO2: 97%   Weight: 189 lb (85.7 kg)   Height: 5' 7\" (1.702 m)     Body mass index is 29.6 kg/m². Objective  Physical Exam  General: Patient alert and oriented and in NAD  NECK examination:   Inspection:  normal skin, soft tissue and bony appearance   Normal cervical lordosis; no gross edema or evidence of acute injury; No previous surgical scars   Palpation: pain free to palpation  Neurovascular: normal sensory exam of C1 through T2 to light touch and pain;   Deep Tendon Reflexes: 2/4 bilat bicep;  2/4 bilat tricep;   Muscular Strength:  5/5 graded muscle strength of the intrinsic muscles of the cervical spine, the deltoid, bicep, tricep, wrist and finger flexors and extensors including the hand intrinsics;   Range of Motion: full active ROM with flexion and extension, left rotation, and right rotation; left and right lateral flexion  Manuevers:  Spurling sign-negative bilaterally  Avila sign-negative bilaterally  Axial pressure-pain  free  Neuro: AAOx3, normal gait and speech. No gross neurologic deficits. Psych: Appropriate mood and affect, no homicidal or suicidal ideation, no obsessions, delusions or hallucinations, normal psychomotor status.

## 2020-07-24 NOTE — PROGRESS NOTES
Your thyroid test is fine. I'll be in touch about the xrays. See the brain doctor as we discussed.   [facility]

## 2020-07-29 DIAGNOSIS — I10 ESSENTIAL HYPERTENSION: ICD-10-CM

## 2020-07-29 DIAGNOSIS — E78.00 HYPERCHOLESTEREMIA: ICD-10-CM

## 2020-07-30 RX ORDER — ROSUVASTATIN CALCIUM 5 MG/1
TABLET, COATED ORAL
Qty: 90 TAB | Refills: 1 | Status: SHIPPED | OUTPATIENT
Start: 2020-07-30 | End: 2020-12-17

## 2020-07-30 RX ORDER — ENALAPRIL MALEATE AND HYDROCHLOROTHIAZIDE 5; 12.5 MG/1; MG/1
TABLET ORAL
Qty: 90 TAB | Refills: 1 | Status: SHIPPED | OUTPATIENT
Start: 2020-07-30 | End: 2020-12-17

## 2020-08-05 ENCOUNTER — HOSPITAL ENCOUNTER (OUTPATIENT)
Dept: GENERAL RADIOLOGY | Age: 80
Discharge: HOME | End: 2020-08-05
Attending: FAMILY MEDICINE
Payer: MEDICARE

## 2020-08-05 ENCOUNTER — OFFICE VISIT (OUTPATIENT)
Dept: NEUROLOGY | Age: 80
End: 2020-08-05
Payer: MEDICARE

## 2020-08-05 VITALS
TEMPERATURE: 97.1 F | OXYGEN SATURATION: 99 % | SYSTOLIC BLOOD PRESSURE: 140 MMHG | BODY MASS INDEX: 29.66 KG/M2 | RESPIRATION RATE: 16 BRPM | WEIGHT: 189 LBS | DIASTOLIC BLOOD PRESSURE: 70 MMHG | HEIGHT: 67 IN | HEART RATE: 68 BPM

## 2020-08-05 DIAGNOSIS — R20.2 PARESTHESIA: ICD-10-CM

## 2020-08-05 DIAGNOSIS — R20.2 NUMBNESS AND TINGLING IN BOTH HANDS: ICD-10-CM

## 2020-08-05 DIAGNOSIS — R20.0 NUMBNESS AND TINGLING IN BOTH HANDS: ICD-10-CM

## 2020-08-05 DIAGNOSIS — R20.2 PARESTHESIA: Primary | ICD-10-CM

## 2020-08-05 PROCEDURE — 72050 X-RAY EXAM NECK SPINE 4/5VWS: CPT

## 2020-08-05 PROCEDURE — 99204 OFFICE O/P NEW MOD 45 MIN: CPT | Performed by: PSYCHIATRY & NEUROLOGY

## 2020-08-05 NOTE — PROGRESS NOTES
Chief Complaint   Patient presents with    New Patient     PCP referred patient c/o numbness in hands since March. Patient states she woke up one morning with it and it hasnt gone away. No pain, just numbness.      Visit Vitals  /70 (BP 1 Location: Right arm, BP Patient Position: Sitting)   Pulse 68   Temp 97.1 °F (36.2 °C)   Resp 16   Ht 5' 7\" (1.702 m)   Wt 85.7 kg (189 lb)   SpO2 99%   BMI 29.60 kg/m²

## 2020-08-05 NOTE — PROGRESS NOTES
NEUROLOGY NEW PATIENT OFFICE CONSULTATION      8/5/2020    RE: Judith Sherman         1940      REFERRED BY:  Denis Greene MD        CHIEF COMPLAINT:  This is Judith Sherman is a 68 y.o. female right handed lives with  who had concerns including New Patient (PCP referred patient c/o numbness in hands since March. Patient states she woke up one morning with it and it hasnt gone away. No pain, just numbness. ). HPI:     Middle of March 2020, patient started noticing numbness in both hands, symmetric, involving all fingers,  Described as tightness, constant, no aggravating/relieving factor. (-) weakness. Tried using wrist splint for 2 weeks with no benefit.     (-) neck pain   (-) wrist pain  (-) elbow pain    ROS   (-) fever  (-) rash  All other systems reviewed and are negative    Past Medical Hx  Past Medical History:   Diagnosis Date    Cataract     Diabetes ([facility] [address]     Type 2 diet only     Diverticulosis     Hemorrhoids     Large carpet -like rectal polyps  seen on coloscopy  2015    Hyperlipidemia     Hypertension     Onychomycosis     Onychomycosis     Sciatica     Transient global amnesia     Transient global amnesia     h/o        Social Hx  Social History     Socioeconomic History    Marital status:      Spouse name: Not on file    Number of children: Not on file    Years of education: Not on file    Highest education level: Not on file   Tobacco Use    Smoking status: Never Smoker    Smokeless tobacco: Never Used   Substance and Sexual Activity    Alcohol use: No     Frequency: Never    Drug use: No    Sexual activity: Yes       Family Hx  Family History   Problem Relation Age of Onset    Hypertension Mother     Stroke Mother     Diabetes Mother     Hypertension Maternal Grandmother     Stroke Maternal Grandmother        ALLERGIES  Allergies   Allergen Reactions    Penicillins Unknown (comments) and Swelling     None noted        CURRENT MEDS  Current Outpatient Medications   Medication Sig Dispense Refill    rosuvastatin (CRESTOR) 5 mg tablet TAKE 1 TABLET EVERY DAY 90 Tab 1    enalapril-hydroCHLOROthiazide (VASERETIC) 5-12.5 mg tablet TAKE 1 TABLET EVERY DAY 90 Tab 1    docosahexanoic acid/epa (FISH OIL PO) Take  by mouth.  MULTIVITAMIN PO Take  by mouth.  calcium carbonate-vitamin d2 500 mg(1,250mg) -200 unit tab Take  by mouth.  aspirin delayed-release 81 mg tablet Take  by mouth daily. PREVIOUS WORKUP: (reviewed)  IMAGING:    CT Results (recent):  No results found for this or any previous visit. MRI Results (recent):  No results found for this or any previous visit. IR Results (recent):  No results found for this or any previous visit. VAS/US Results (recent):  No results found for this or any previous visit. LABS (reviewed)  Results for orders placed or performed during the hospital encounter of 07/23/20   TSH 3RD GENERATION   Result Value Ref Range    TSH 2.12 0.36 - 3.74 uIU/mL       Physical Exam:     Visit Vitals  /70 (BP 1 Location: Right arm, BP Patient Position: Sitting)   Pulse 68   Temp 97.1 °F (36.2 °C)   Resp 16   Ht 5' 7\" (1.702 m)   Wt 85.7 kg (189 lb)   SpO2 99%   BMI 29.60 kg/m²     General:  Alert, cooperative, no distress. Head:  Normocephalic, without obvious abnormality, atraumatic. Eyes:  Conjunctivae/corneas clear. Lungs:  Heart:   Non labored breathing  Regular rate and rhythm, no carotid bruits   Abdomen:   Soft, non-distended   Extremities: Extremities normal, atraumatic, no cyanosis or edema. Pulses: 2+ and symmetric all extremities. Skin: Skin color, texture, turgor normal. No rashes or lesions.   Neurologic Exam     Gen: Attention normal             Language: naming, repetition, fluency normal             Memory: intact recent and remote memory  Cranial Nerves:  I: smell Not tested   II: visual fields Full to confrontation   II: pupils Equal, round, reactive to light II: optic disc No papilledema   III,VII: ptosis none   III,IV,VI: extraocular muscles  Full ROM   V: mastication normal   V: facial light touch sensation  normal   VII: facial muscle function   symmetric   VIII: hearing symmetric   IX: soft palate elevation  normal   XI: trapezius strength  5/5   XI: sternocleidomastoid strength 5/5   XI: neck flexion strength  5/5   XII: tongue  midline     Motor: normal bulk and tone, no tremor              Strength: 5/5 all four extremities  (-) Tinnels  (-) Phalens  Sensory: intact to LT, PP, vibration, and JPS  Reflexes: 2+ throughout; Down going toes  Coordination: Good FTN and HTS  Gait: normal gait including tandem            Impression:     Ngozi Harris is a 68 y.o. female who  has a past medical history of Cataract, Diabetes ([address] Diverticulosis, Hemorrhoids, Hyperlipidemia, Hypertension, Onychomycosis, Onychomycosis, Sciatica, Transient global amnesia, and Transient global amnesia. who since middle of March 2020, started noticing numbness in both hands, symmetric, involving all fingers, described as tightness, constant, no aggravating/relieving factor. (-) weakness. Tried using wrist splint for 2 weeks with no benefit. Considerations include bilateral carpal tunnel syndrome and cervical radiculopathy (although patient denies neck pain)    RECOMMENDATIONS  1. I had a long discussion with patient. Discussed diagnosis, prognosis, pathophysiology and available treatment. All questions were answered. 2. Will do EMG/NCS of both UE with CTS protocol  3. Patient is scheduled for X-ray of the neck  4. Depending on above, will consider MRI cervical spine and referral to hand surgeon        Follow-up and Dispositions    · Return for above testing.             Thank you for the consultation      Dotty Carrillo MD  Diplomate, American Board of Psychiatry and Neurology  Diplomate, Neuromuscular Medicine  Diplomate, American Board of Electrodiagnostic Medicine        CC: Jimy Baker MD  Fax: [phone]

## 2020-08-11 NOTE — PROGRESS NOTES
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 Get the additional tests as advised by the neurologist.  See me once the neurologist has worked through these issues with you.   [facility]

## 2020-08-21 ENCOUNTER — OFFICE VISIT (OUTPATIENT)
Dept: NEUROLOGY | Age: 80
End: 2020-08-21

## 2020-08-21 VITALS — TEMPERATURE: 96.9 F

## 2020-08-21 DIAGNOSIS — R20.0 NUMBNESS AND TINGLING IN BOTH HANDS: Primary | ICD-10-CM

## 2020-08-21 DIAGNOSIS — R20.2 NUMBNESS AND TINGLING IN BOTH HANDS: Primary | ICD-10-CM

## 2020-08-21 NOTE — PROCEDURES
EMG/ NCS Report  [facility]  - [facility]  [address] [facility], [address] [place] [address] [address], [address]   Ph: [phone]   FAX: [phone]/ [phone]  Test Date:  2020    Patient: Dilia Camarillo : 1940 Physician: Shannon Garcia MD   Sex: Female Height: ' \" Ref Phys: Leela Díaz MD   ID#: [id number] Weight:  lbs. Technician: Duncan Adhikari     Patient History / Exam:  Patient is coming for neuropathy evaluation. Nabeel Mccarthy is a 68 y.o. female who  has a past medical history of Cataract, Diabetes ([address] Diverticulosis, Hemorrhoids, Hyperlipidemia, Hypertension, Onychomycosis, Onychomycosis, Sciatica, Transient global amnesia, and Transient global amnesia. who since middle of 2020, started noticing numbness in both hands, symmetric, involving all fingers, described as tightness, constant, no aggravating/relieving factor. (-) weakness. Tried using wrist splint for 2 weeks with no benefit. Exam: Patient awake, alert, follows commands, clear speech; hearing grossly intact; EOMI, (-) facial asymmetry, tongue midline; Motor: normal bulk and tone, no tremor              Strength: 5/5 all four extremities  (-) Tinnels  (-) Phalens  Sensory: intact to LT, PP, vibration, and JPS  Reflexes: 2+ throughout; Down going toes  Coordination: Good FTN and HTS  Gait: normal gait including tandem           EMG & NCV Findings:  Evaluation of the left median motor nerve showed normal distal onset latency (4.4 ms), normal amplitude (10.7 mV), and normal conduction velocity (Elbow-Wrist, 50 m/s). The right median motor nerve showed prolonged distal onset latency (4.8 ms), normal amplitude (7.4 mV), and decreased conduction velocity (Elbow-Wrist, 47 m/s).   The left ulnar motor and the right ulnar motor nerves showed prolonged distal onset latency (L3.7, R4.0 ms), normal amplitude (L8.4, R9.2 mV), normal conduction velocity (B Elbow-Wrist, L58, R54 m/s), and normal conduction velocity (A Elbow-B Elbow, L59, R59 m/s). The left median sensory, the right median sensory, the left ulnar sensory, and the right ulnar sensory nerves showed prolonged distal peak latency (L4.7, R4.9, L4.2, R4.3 ms) and normal amplitude (L23.7, R17.5, L19.0, R14.8 µV). The left radial sensory and the right radial sensory nerves showed normal distal peak latency (L2.3, R2.3 ms) and normal amplitude (L28.7, R36.1 µV). All left vs. right side differences were within normal limits. All F Wave latencies were within normal limits. All F Wave left vs. right side latency differences were within normal limits. Needle evaluation of the left abductor pollicis brevis and the right abductor pollicis brevis muscles showed diminished recruitment. The left biceps, the left triceps, the right biceps, the right triceps, and the right pronator teres muscles showed diminished recruitment, Incr Duration, and increased motor unit amplitude. All remaining muscles (as indicated in the following table) showed no evidence of electrical instability. Impression:  ABNORMAL    Extensive electrodiagnostic examination of the right and left upper extremities shows the followin) Evidence of bilateral median neuropathies at or distal to the wrist, consistent with a clinical diagnosis of carpal tunnel syndrome, both mild in degree electrically, right worse than left. 2) Evidence of superimposed bilateral ulnar neuropathies at or distal to the wrist, both mild in degree electrically. 3) Chronic, without active, motor axon loss changes in muscles innervated by bilateral C6 roots/segments suggestive of an underlying chronic, bilateral C6 motor radiculopathy. Recommend radiological correlation.            Wilian Dyer MD  Diplomate, American Board of Psychiatry and Neurology  Diplomate, Neuromuscular Medicine  Diplomate, American Board of Electrodiagnostic Medicine  Director, [facility] Accredited Laboratory with Exemplary Status          Nerve Conduction Studies  Anti Sensory Summary Table     Stim Site NR Peak (ms) Norm Peak (ms) P-T Amp (µV) Norm P-T Amp Site1 Site2 Dist (cm)   Left Median Anti Sensory (2nd Digit)  29.7°C   Wrist    4.7 <4 23.7 >13 Wrist 2nd Digit 14.0   Right Median Anti Sensory (2nd Digit)  30.4°C   Wrist    4.9 <4 17.5 >13 Wrist 2nd Digit 14.0   Left Radial Anti Sensory (Base 1st Digit)  29.2°C   Wrist    2.3 <2.8 28.7 >11 Wrist Base 1st Digit 10.0   Right Radial Anti Sensory (Base 1st Digit)  30°C   Wrist    2.3 <2.8 36.1 >11 Wrist Base 1st Digit 10.0   Left Ulnar Anti Sensory (5th Digit)  29.4°C   Wrist    4.2 <4.0 19.0 >9 Wrist 5th Digit 14.0   Right Ulnar Anti Sensory (5th Digit)  30.1°C   Wrist    4.3 <4.0 14.8 >9 Wrist 5th Digit 14.0     Motor Summary Table     Stim Site NR Onset (ms) Norm Onset (ms) O-P Amp (mV) Norm O-P Amp Amp (Prev) (%) Site1 Site2 Dist (cm) Chris (m/s) Norm Chris (m/s)   Left Median Motor (Abd Poll Brev)  29.2°C   Wrist    4.4 <4.5 10.7 >4.1 100.0 Wrist Abd Poll Brev 8.0     Elbow    8.8  9.3  86.9 Elbow Wrist 22.0 50 >49   Right Median Motor (Abd Poll Brev)  29.8°C   Wrist    4.8 <4.5 7.4 >4.1 100.0 Wrist Abd Poll Brev 8.0     Elbow    9.5  5.8  78.4 Elbow Wrist 22.0 47 >49   Left Ulnar Motor (Abd Dig Minimi)  29°C   Wrist    3.7 <3.1 8.4 >7.0 100.0 Wrist Abd Dig Minimi 8.0  >50   B Elbow    7.5  8.0  95.2 B Elbow Wrist 22.0 58 >50   A Elbow    9.2  8.0  100.0 A Elbow B Elbow 10.0 59 >50   Right Ulnar Motor (Abd Dig Minimi)  29°C   Wrist    4.0 <3.1 9.2 >7.0 100.0 Wrist Abd Dig Minimi 8.0  >50   B Elbow    8.1  9.2  100.0 B Elbow Wrist 22.0 54 >50   A Elbow    9.8  9.0  97.8 A Elbow B Elbow 10.0 59 >50     F Wave Studies     NR F-Lat (ms) Lat Norm (ms) L-R F-Lat (ms) L-R Lat Norm   Left Ulnar (Mrkrs) (Abd Dig Min)  28.8°C      31.01 <32 0.00 <2.5   Right Ulnar (Mrkrs) (Abd Dig Min)  30.2°C      31.01 <32 0.00 <2.5     EMG     Side Muscle Nerve Root Ins Act Fibs Psw Recrt Duration Amp Poly Comment   Left 1stDorInt Ulnar C8-T1 Nml Nml Nml Nml Nml Nml Nml    Left ExtIndicis Radial (Post Int) C7-8 Nml Nml Nml Nml Nml Nml Nml    Left Abd Poll Brev Median C8-T1 Nml Nml Nml Reduced Nml Nml Nml    Left Biceps Musculocut C5-6 Nml Nml Nml Reduced Incr Incr Nml    Left Triceps Radial C6-7-8 Nml Nml Nml Reduced Incr Incr Nml    Left Deltoid Axillary C5-6 Nml Nml Nml Nml Nml Nml Nml    Right 1stDorInt Ulnar C8-T1 Nml Nml Nml Nml Nml Nml Nml    Right ExtIndicis Radial (Post Int) C7-8 Nml Nml Nml Nml Nml Nml Nml    Right Abd Poll Brev Median C8-T1 Nml Nml Nml Reduced Nml Nml Nml    Right Biceps Musculocut C5-6 Nml Nml Nml Reduced Incr Incr Nml    Right Triceps Radial C6-7-8 Nml Nml Nml Reduced Incr Incr Nml    Right Deltoid Axillary C5-6 Nml Nml Nml Nml Nml Nml Nml    Right Lower Cerv Parasp Rami C7,T1 Nml Nml Nml Nml Nml Nml Nml    Right PronatorTeres Median C6-7 Nml Nml Nml Reduced Incr Incr Nml                Nerve Conduction Studies  Anti Sensory Left/Right Comparison     Stim Site L Lat (ms) R Lat (ms) L-R Lat (ms) L Amp (µV) R Amp (µV) L-R Amp (%) Site1 Site2 L Chris (m/s) R Chris (m/s) L-R Chris (m/s)   Median Anti Sensory (2nd Digit)  29.7°C   Wrist 3.7 3.6 0.1 23.7 17.5 26.2 Wrist 2nd Digit 38 39 1   Radial Anti Sensory (Base 1st Digit)  29.2°C   Wrist 1.6 1.8 0.2 28.7 36.1 20.5 Wrist Base 1st Digit 63 56 7   Ulnar Anti Sensory (5th Digit)  29.4°C   Wrist 3.4 3.1 0.3 19.0 14.8 22.1 Wrist 5th Digit 41 45 4     Motor Left/Right Comparison     Stim Site L Lat (ms) R Lat (ms) L-R Lat (ms) L Amp (mV) R Amp (mV) L-R Amp (%) Site1 Site2 L Chris (m/s) R Chris (m/s) L-R Chris (m/s)   Median Motor (Abd Poll Brev)  29.2°C   Wrist 4.4 4.8 0.4 10.7 7.4 30.8 Wrist Abd Poll Brev      Elbow 8.8 9.5 0.7 9.3 5.8 37.6 Elbow Wrist 50 47 3   Ulnar Motor (Abd Dig Minimi)  29°C   Wrist 3.7 4.0 0.3 8.4 9.2 8.7 Wrist Abd Dig Minimi      B Elbow 7.5 8.1 0.6 8.0 9.2 13.0 B Elbow Wrist 58 54 4   A Elbow 9.2 9.8 0.6 8.0 9.0 11.1 A Elbow B Elbow 59 59 0         Waveforms:

## 2020-08-21 NOTE — PROGRESS NOTES
EMG/NCS done. See Procedure Note for results. Discussed extensive electrodiagnostic examination of the right and left upper extremities shows the followin) Evidence of bilateral median neuropathies at or distal to the wrist, consistent with a clinical diagnosis of carpal tunnel syndrome, both mild in degree electrically, right worse than left. 2) Evidence of superimposed bilateral ulnar neuropathies at or distal to the wrist, both mild in degree electrically. 3) Chronic, without active, motor axon loss changes in muscles innervated by bilateral C6 roots/segments suggestive of an underlying chronic, bilateral C6 motor radiculopathy. Recommend radiological correlation.       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

## 2020-08-28 ENCOUNTER — HOSPITAL ENCOUNTER (OUTPATIENT)
Dept: MRI IMAGING | Age: 80
Discharge: HOME | End: 2020-08-28
Attending: PSYCHIATRY & NEUROLOGY
Payer: MEDICARE

## 2020-08-28 DIAGNOSIS — R20.2 NUMBNESS AND TINGLING IN BOTH HANDS: ICD-10-CM

## 2020-08-28 DIAGNOSIS — R20.0 NUMBNESS AND TINGLING IN BOTH HANDS: ICD-10-CM

## 2020-08-28 PROCEDURE — 72141 MRI NECK SPINE W/O DYE: CPT

## 2020-09-08 NOTE — PROGRESS NOTES
Azeb Kapadia MD  [facility] April , LPN   Pls inform patient that MRI cervical spine shows severe degenerative disc disease. P> Refer to neurosurgery for surgical opinion. 9/1/20 Patient was notified of cerv spine MRI results and MDs recommendations through [employer].

## 2020-09-29 ENCOUNTER — TELEPHONE (OUTPATIENT)
Dept: NEUROLOGY | Age: 80
End: 2020-09-29

## 2020-09-29 NOTE — TELEPHONE ENCOUNTER
Called patient and informed her that I just routed our records to Dr. Rodriguez Sessions office. Patient states understanding.

## 2020-09-29 NOTE — TELEPHONE ENCOUNTER
Patient said she was referred to Dr. Ariana Jeffery but they need office notes before they'll schedule an appt.

## 2020-09-30 ENCOUNTER — TELEPHONE (OUTPATIENT)
Dept: NEUROLOGY | Age: 80
End: 2020-09-30

## 2020-09-30 NOTE — TELEPHONE ENCOUNTER
Patient called stated that hr notes werent faxed to Dr. Lynn Graham office . Patient called this morning.  Patient is requesting that they are faxed over so she can make an appt

## 2020-09-30 NOTE — TELEPHONE ENCOUNTER
I had routed our notes through [address] [facility] to Dr. Triny Espino office. I just printed them out and faxed them to [phone] with confirmation. I called and informed patient.

## 2020-10-05 NOTE — LETTER
8/5/20 Patient: Ramiro Junior YOB: 1940 Date of Visit: 8/5/2020 Abraham Abraham MD 
[address] [facility] [address] [facility] [address] [id number] VIA In Basket Abraham Abraham MD 
VIA Dear MD Abraham Landers MD, Thank you for referring Ms. Ramiro Junior to [facility] for evaluation. My notes for this consultation are attached. If you have questions, please do not hesitate to call me. I look forward to following your patient along with you. Sincerely, Carson Lin MD 
 
 High Dose Vitamin A Pregnancy And Lactation Text: High dose vitamin A therapy is contraindicated during pregnancy and breast feeding.

## 2020-11-18 ENCOUNTER — HOSPITAL ENCOUNTER (OUTPATIENT)
Dept: PREADMISSION TESTING | Age: 80
Discharge: HOME | End: 2020-11-18
Payer: MEDICARE

## 2020-11-18 VITALS
RESPIRATION RATE: 18 BRPM | BODY MASS INDEX: 29.76 KG/M2 | HEART RATE: 80 BPM | TEMPERATURE: 98.2 F | DIASTOLIC BLOOD PRESSURE: 78 MMHG | SYSTOLIC BLOOD PRESSURE: 146 MMHG | WEIGHT: 189.6 LBS | HEIGHT: 67 IN

## 2020-11-18 LAB
ANION GAP SERPL CALC-SCNC: 3 MMOL/L (ref 5–15)
BASOPHILS # BLD: 0 K/UL (ref 0–0.1)
BASOPHILS NFR BLD: 1 % (ref 0–1)
BUN SERPL-MCNC: 14 MG/DL (ref 6–20)
BUN/CREAT SERPL: 20 (ref 12–20)
CALCIUM SERPL-MCNC: 10 MG/DL (ref 8.5–10.1)
CHLORIDE SERPL-SCNC: 103 MMOL/L (ref 97–108)
CO2 SERPL-SCNC: 33 MMOL/L (ref 21–32)
CREAT SERPL-MCNC: 0.7 MG/DL (ref 0.55–1.02)
DIFFERENTIAL METHOD BLD: ABNORMAL
EOSINOPHIL # BLD: 0.1 K/UL (ref 0–0.4)
EOSINOPHIL NFR BLD: 2 % (ref 0–7)
ERYTHROCYTE [DISTWIDTH] IN BLOOD BY AUTOMATED COUNT: 16.1 % (ref 11.5–14.5)
GLUCOSE SERPL-MCNC: 83 MG/DL (ref 65–100)
HCT VFR BLD AUTO: 49.2 % (ref 35–47)
HGB BLD-MCNC: 15.3 G/DL (ref 11.5–16)
IMM GRANULOCYTES # BLD AUTO: 0 K/UL (ref 0–0.04)
IMM GRANULOCYTES NFR BLD AUTO: 0 % (ref 0–0.5)
LYMPHOCYTES # BLD: 2.6 K/UL (ref 0.8–3.5)
LYMPHOCYTES NFR BLD: 41 % (ref 12–49)
MCH RBC QN AUTO: 26.1 PG (ref 26–34)
MCHC RBC AUTO-ENTMCNC: 31.1 G/DL (ref 30–36.5)
MCV RBC AUTO: 84 FL (ref 80–99)
MONOCYTES # BLD: 0.5 K/UL (ref 0–1)
MONOCYTES NFR BLD: 8 % (ref 5–13)
NEUTS SEG # BLD: 3 K/UL (ref 1.8–8)
NEUTS SEG NFR BLD: 48 % (ref 32–75)
NRBC # BLD: 0 K/UL (ref 0–0.01)
NRBC BLD-RTO: 0 PER 100 WBC
PLATELET # BLD AUTO: 271 K/UL (ref 150–400)
PMV BLD AUTO: 11 FL (ref 8.9–12.9)
POTASSIUM SERPL-SCNC: 4.4 MMOL/L (ref 3.5–5.1)
RBC # BLD AUTO: 5.86 M/UL (ref 3.8–5.2)
SODIUM SERPL-SCNC: 139 MMOL/L (ref 136–145)
WBC # BLD AUTO: 6.3 K/UL (ref 3.6–11)

## 2020-11-18 PROCEDURE — 85025 COMPLETE CBC W/AUTO DIFF WBC: CPT

## 2020-11-18 PROCEDURE — 80048 BASIC METABOLIC PNL TOTAL CA: CPT

## 2020-11-18 PROCEDURE — 93005 ELECTROCARDIOGRAM TRACING: CPT

## 2020-11-18 PROCEDURE — 36415 COLL VENOUS BLD VENIPUNCTURE: CPT

## 2020-11-18 RX ORDER — GARLIC 1000 MG
1000 CAPSULE ORAL DAILY
COMMUNITY

## 2020-11-18 NOTE — PERIOP NOTES
PRE-OPERATIVE INSTRUCTIONS REVIEWED WITH PATIENT. PT GIVEN TIME TO ASK QUESTIONS   PATIENT GIVEN 2-BOTTLE OF CHG SOAP. REVIEWED WITH PATIENT   PATIENT GIVEN SSI INFECTION FAQ SHEET.   MRSA / MSSA TREATMENT INSTRUCTION SHEET GIVEN      EKG PERFORMED    INSTRUCTIONS GIVEN FOR NEW ADMISSION AND COVID TESTING PT SCHEDULED ALREADY, REMINDED OF SELF QUARANTINE AND WHERE TO GO

## 2020-11-19 LAB
ATRIAL RATE: 68 BPM
BACTERIA SPEC CULT: NORMAL
BACTERIA SPEC CULT: NORMAL
CALCULATED P AXIS, ECG09: 46 DEGREES
CALCULATED R AXIS, ECG10: -3 DEGREES
CALCULATED T AXIS, ECG11: 17 DEGREES
DIAGNOSIS, 93000: NORMAL
P-R INTERVAL, ECG05: 154 MS
Q-T INTERVAL, ECG07: 390 MS
QRS DURATION, ECG06: 88 MS
QTC CALCULATION (BEZET), ECG08: 414 MS
SERVICE CMNT-IMP: NORMAL
VENTRICULAR RATE, ECG03: 68 BPM

## 2020-11-19 NOTE — PERIOP NOTES
PAT Nurse Practitioner   Pre-Operative Chart Review/Assessment:-ORTHOPEDIC/NEUROSURGICAL SPINE                Patient Name:  Sharry Rubinstein                                                           Age:   68 y.o.    :  1940     Today's Date:  2020     Date of PAT:   2020      Date of Surgery:    2020      Procedure(s):  C3-C6 ACDF     Surgeon:   Dr. Velez [place]:       1)  PCP: Dr. Oswaldo Kirby        2)  Cardiac Clearance: Not requested. 3)  Diabetic Treatment Consult: A1c not ordered. Glucose 86 on labs. 4)  Sleep Apnea evaluation:  Not indicated.  MAGGY Score 2.       5)  Treatment for MRSA/Staph Aureus: Neg       6)  Additional Concerns: HTN, T2DM (diet controlled)              Vital Signs:         Vitals:    20 1500   BP: (!) 146/78   Pulse: 80   Resp: 18   Temp: 98.2 °F (36.8 °C)   Weight: 86 kg (189 lb 9.5 oz)   Height: 5' 7\" (1.702 m)            ____________________________________________  PAST MEDICAL HISTORY  Past Medical History:   Diagnosis Date    Arthritis     KNEES     Cataract     Diverticulosis     Hemorrhoids     Large carpet -like rectal polyps  seen on coloscopy      Hyperlipidemia     PT STATES NOT     Hypertension     Onychomycosis     Prediabetes     Sciatica     Transient global amnesia     h/o       ____________________________________________  PAST SURGICAL HISTORY  Past Surgical History:   Procedure Laterality Date    HX CATARACT REMOVAL Right     HX COLONOSCOPY  2014: ext hemorrhoids, diverticulosis, carpet-like polyp (resected by Dr. Leigh Cea 2/12/15) Repeat due 2018     HX HIP REPLACEMENT Right     HX HIP REPLACEMENT Right     HX HYSTERECTOMY  1996    HX KNEE REPLACEMENT Right     HX KNEE REPLACEMENT Left 2019    HX VEIN STRIPPING      HX WISDOM TEETH EXTRACTION        ____________________________________________  HOME MEDICATIONS    Current Outpatient Medications   Medication Sig  garlic 3,839 mg cap Take 1,000 mg by mouth daily.  rosuvastatin (CRESTOR) 5 mg tablet TAKE 1 TABLET EVERY DAY    enalapril-hydroCHLOROthiazide (VASERETIC) 5-12.5 mg tablet TAKE 1 TABLET EVERY DAY    docosahexanoic acid/epa (FISH OIL PO) Take  by mouth.  MULTIVITAMIN PO Take  by mouth.  calcium carbonate-vitamin d2 500 mg(1,250mg) -200 unit tab Take  by mouth.  aspirin delayed-release 81 mg tablet Take  by mouth daily.      No current facility-administered medications for this encounter.       ____________________________________________  ALLERGIES  Allergies   Allergen Reactions    Penicillins Unknown (comments) and Swelling     None noted       ____________________________________________  SOCIAL HISTORY  Social History     Tobacco Use    Smoking status: Never Smoker    Smokeless tobacco: Never Used   Substance Use Topics    Alcohol use: No     Frequency: Never     Comment: RARELY       ____________________________________________        Labs:     Hospital Outpatient Visit on 11/18/2020   Component Date Value Ref Range Status    Ventricular Rate 11/18/2020 68  BPM Final    Atrial Rate 11/18/2020 68  BPM Final    P-R Interval 11/18/2020 154  ms Final    QRS Duration 11/18/2020 88  ms Final    Q-T Interval 11/18/2020 390  ms Final    QTC Calculation (Bezet) 11/18/2020 414  ms Final    Calculated P Axis 11/18/2020 46  degrees Final    Calculated R Axis 11/18/2020 -3  degrees Final    Calculated T Axis 11/18/2020 17  degrees Final    Diagnosis 11/18/2020    Final                    Value:Normal sinus rhythm with sinus arrhythmia  Minimal voltage criteria for LVH, may be normal variant  No previous ECGs available  Confirmed by Jazmin Hernandez M.D., Eloisa Lay ([id number]) on 11/19/2020 10:51:12 AM      WBC 11/18/2020 6.3  3.6 - 11.0 K/uL Final    RBC 11/18/2020 5.86* 3.80 - 5.20 M/uL Final    HGB 11/18/2020 15.3  11.5 - 16.0 g/dL Final    HCT 11/18/2020 49.2* 35.0 - 47.0 % Final    MCV 11/18/2020 84.0  80.0 - 99.0 FL Final    MCH 11/18/2020 26.1  26.0 - 34.0 PG Final    MCHC 11/18/2020 31.1  30.0 - 36.5 g/dL Final    RDW 11/18/2020 16.1* 11.5 - 14.5 % Final    PLATELET 95/03/4154 346  150 - 400 K/uL Final    MPV 11/18/2020 11.0  8.9 - 12.9 FL Final    NRBC 11/18/2020 0.0  0  WBC Final    ABSOLUTE NRBC 11/18/2020 0.00  0.00 - 0.01 K/uL Final    NEUTROPHILS 11/18/2020 48  32 - 75 % Final    LYMPHOCYTES 11/18/2020 41  12 - 49 % Final    MONOCYTES 11/18/2020 8  5 - 13 % Final    EOSINOPHILS 11/18/2020 2  0 - 7 % Final    BASOPHILS 11/18/2020 1  0 - 1 % Final    IMMATURE GRANULOCYTES 11/18/2020 0  0.0 - 0.5 % Final    ABS. NEUTROPHILS 11/18/2020 3.0  1.8 - 8.0 K/UL Final    ABS. LYMPHOCYTES 11/18/2020 2.6  0.8 - 3.5 K/UL Final    ABS. MONOCYTES 11/18/2020 0.5  0.0 - 1.0 K/UL Final    ABS. EOSINOPHILS 11/18/2020 0.1  0.0 - 0.4 K/UL Final    ABS. BASOPHILS 11/18/2020 0.0  0.0 - 0.1 K/UL Final    ABS. IMM. GRANS. 11/18/2020 0.0  0.00 - 0.04 K/UL Final    DF 11/18/2020 AUTOMATED    Final    Sodium 11/18/2020 139  136 - 145 mmol/L Final    Potassium 11/18/2020 4.4  3.5 - 5.1 mmol/L Final    Chloride 11/18/2020 103  97 - 108 mmol/L Final    CO2 11/18/2020 33* 21 - 32 mmol/L Final    Anion gap 11/18/2020 3* 5 - 15 mmol/L Final    Glucose 11/18/2020 83  65 - 100 mg/dL Final    BUN 11/18/2020 14  6 - 20 MG/DL Final    Creatinine 11/18/2020 0.70  0.55 - 1.02 MG/DL Final    BUN/Creatinine ratio 11/18/2020 20  12 - 20   Final    GFR est AA 11/18/2020 >60  >60 ml/min/1.73m2 Final    GFR est non-AA 11/18/2020 >60  >60 ml/min/1.73m2 Final    Estimated GFR is calculated using the IDMS-traceable Modification of Diet in Renal Disease (MDRD) Study equation, reported for both  Americans (GFRAA) and non- Americans (GFRNA), and normalized to 1.73m2 body surface area. The physician must decide which value applies to the patient.     Calcium 11/18/2020 10.0  8.5 - 10.1 MG/DL Final    Special Requests: 11/18/2020 NO SPECIAL REQUESTS    Final    Culture result: 11/18/2020 MRSA NOT PRESENT    Final       Skin:     Denies open wounds, cuts, sores, rashes or other areas of concern in PAT assessment.         Aura Miner NP

## 2020-11-23 ENCOUNTER — TRANSCRIBE ORDER (OUTPATIENT)
Dept: REGISTRATION | Age: 80
End: 2020-11-23

## 2020-11-23 DIAGNOSIS — Z01.812 PRE-PROCEDURE LAB EXAM: Primary | ICD-10-CM

## 2020-11-25 ENCOUNTER — HOSPITAL ENCOUNTER (OUTPATIENT)
Dept: PREADMISSION TESTING | Age: 80
Discharge: HOME | End: 2020-11-25
Payer: MEDICARE

## 2020-11-25 DIAGNOSIS — Z01.812 PRE-PROCEDURE LAB EXAM: ICD-10-CM

## 2020-11-25 PROCEDURE — 87635 SARS-COV-2 COVID-19 AMP PRB: CPT

## 2020-11-27 LAB — SARS-COV-2, COV2NT: NOT DETECTED

## 2020-11-30 ENCOUNTER — HOSPITAL ENCOUNTER (INPATIENT)
Age: 80
LOS: 1 days | Discharge: HOME | DRG: 472 | End: 2020-12-01
Attending: NEUROLOGICAL SURGERY | Admitting: NEUROLOGICAL SURGERY
Payer: MEDICARE

## 2020-11-30 ENCOUNTER — ANESTHESIA EVENT (OUTPATIENT)
Dept: SURGERY | Age: 80
DRG: 472 | End: 2020-11-30
Payer: MEDICARE

## 2020-11-30 ENCOUNTER — APPOINTMENT (OUTPATIENT)
Dept: GENERAL RADIOLOGY | Age: 80
DRG: 472 | End: 2020-11-30
Attending: NEUROLOGICAL SURGERY
Payer: MEDICARE

## 2020-11-30 ENCOUNTER — ANESTHESIA (OUTPATIENT)
Dept: SURGERY | Age: 80
DRG: 472 | End: 2020-11-30
Payer: MEDICARE

## 2020-11-30 PROBLEM — M48.02 CERVICAL STENOSIS OF SPINAL CANAL: Status: ACTIVE | Noted: 2020-11-30

## 2020-11-30 PROCEDURE — 77030029099 HC BN WAX SSPC -A: Performed by: NEUROLOGICAL SURGERY

## 2020-11-30 PROCEDURE — 77030038600 HC TU BPLR IRR DISP STRY -B: Performed by: NEUROLOGICAL SURGERY

## 2020-11-30 PROCEDURE — 77030040361 HC SLV COMPR DVT MDII -B: Performed by: NEUROLOGICAL SURGERY

## 2020-11-30 PROCEDURE — 77030038552 HC DRN WND MDII -A: Performed by: NEUROLOGICAL SURGERY

## 2020-11-30 PROCEDURE — 00NW0ZZ RELEASE CERVICAL SPINAL CORD, OPEN APPROACH: ICD-10-PCS | Performed by: NEUROLOGICAL SURGERY

## 2020-11-30 PROCEDURE — 77030002933 HC SUT MCRYL J&J -A: Performed by: NEUROLOGICAL SURGERY

## 2020-11-30 PROCEDURE — 74011000250 HC RX REV CODE- 250: Performed by: NEUROLOGICAL SURGERY

## 2020-11-30 PROCEDURE — 74011250636 HC RX REV CODE- 250/636: Performed by: NEUROLOGICAL SURGERY

## 2020-11-30 PROCEDURE — 74011250636 HC RX REV CODE- 250/636: Performed by: ANESTHESIOLOGY

## 2020-11-30 PROCEDURE — 77030010507 HC ADH SKN DERMBND J&J -B: Performed by: NEUROLOGICAL SURGERY

## 2020-11-30 PROCEDURE — 74011000250 HC RX REV CODE- 250: Performed by: NURSE ANESTHETIST, CERTIFIED REGISTERED

## 2020-11-30 PROCEDURE — C1713 ANCHOR/SCREW BN/BN,TIS/BN: HCPCS | Performed by: NEUROLOGICAL SURGERY

## 2020-11-30 PROCEDURE — 77030030028 HC BIT DRL SPN FLAT CHK DSP J&J -B: Performed by: NEUROLOGICAL SURGERY

## 2020-11-30 PROCEDURE — 74011250636 HC RX REV CODE- 250/636

## 2020-11-30 PROCEDURE — 77030008684 HC TU ET CUF COVD -B: Performed by: ANESTHESIOLOGY

## 2020-11-30 PROCEDURE — 74011250636 HC RX REV CODE- 250/636: Performed by: NURSE ANESTHETIST, CERTIFIED REGISTERED

## 2020-11-30 PROCEDURE — 0RT30ZZ RESECTION OF CERVICAL VERTEBRAL DISC, OPEN APPROACH: ICD-10-PCS | Performed by: NEUROLOGICAL SURGERY

## 2020-11-30 PROCEDURE — 74011250637 HC RX REV CODE- 250/637: Performed by: ANESTHESIOLOGY

## 2020-11-30 PROCEDURE — 77030014647 HC SEAL FBRN TISSL BAXT -D: Performed by: NEUROLOGICAL SURGERY

## 2020-11-30 PROCEDURE — 65270000029 HC RM PRIVATE

## 2020-11-30 PROCEDURE — 77030005513 HC CATH URETH FOL11 MDII -B: Performed by: NEUROLOGICAL SURGERY

## 2020-11-30 PROCEDURE — 77030003029 HC SUT VCRL J&J -B: Performed by: NEUROLOGICAL SURGERY

## 2020-11-30 PROCEDURE — 77030039971 HC GRFT BN SUB PRIME HD MUSC -F: Performed by: NEUROLOGICAL SURGERY

## 2020-11-30 PROCEDURE — 76010000173 HC OR TIME 3 TO 3.5 HR INTENSV-TIER 1: Performed by: NEUROLOGICAL SURGERY

## 2020-11-30 PROCEDURE — 76060000037 HC ANESTHESIA 3 TO 3.5 HR: Performed by: NEUROLOGICAL SURGERY

## 2020-11-30 PROCEDURE — 77030011267 HC ELECTRD BLD COVD -A: Performed by: NEUROLOGICAL SURGERY

## 2020-11-30 PROCEDURE — 77030034475 HC MISC IMPL SPN: Performed by: NEUROLOGICAL SURGERY

## 2020-11-30 PROCEDURE — 2709999900 HC NON-CHARGEABLE SUPPLY: Performed by: NEUROLOGICAL SURGERY

## 2020-11-30 PROCEDURE — 77030004391 HC BUR FLUT MEDT -C: Performed by: NEUROLOGICAL SURGERY

## 2020-11-30 PROCEDURE — 77030026438 HC STYL ET INTUB CARD -A: Performed by: ANESTHESIOLOGY

## 2020-11-30 PROCEDURE — 74011250637 HC RX REV CODE- 250/637: Performed by: NEUROLOGICAL SURGERY

## 2020-11-30 PROCEDURE — 77030003666 HC NDL SPINAL BD -A: Performed by: NEUROLOGICAL SURGERY

## 2020-11-30 PROCEDURE — 0RG20A0 FUSION OF 2 OR MORE CERVICAL VERTEBRAL JOINTS WITH INTERBODY FUSION DEVICE, ANTERIOR APPROACH, ANTERIOR COLUMN, OPEN APPROACH: ICD-10-PCS | Performed by: NEUROLOGICAL SURGERY

## 2020-11-30 PROCEDURE — 76210000017 HC OR PH I REC 1.5 TO 2 HR: Performed by: NEUROLOGICAL SURGERY

## 2020-11-30 PROCEDURE — 51798 US URINE CAPACITY MEASURE: CPT

## 2020-11-30 DEVICE — GRAFT BONE SUB 5CC DEMIN BONE MTRX PRIM HD: Type: IMPLANTABLE DEVICE | Site: SPINE CERVICAL | Status: FUNCTIONAL

## 2020-11-30 DEVICE — IMPLANTABLE DEVICE
Type: IMPLANTABLE DEVICE | Site: SPINE CERVICAL | Status: FUNCTIONAL
Brand: ACIS® PROTI 360°™

## 2020-11-30 DEVICE — SCREW SPNL L14MM DIA4MM ANT CERV TI SELF DRL CONSTRN: Type: IMPLANTABLE DEVICE | Site: SPINE CERVICAL | Status: FUNCTIONAL

## 2020-11-30 DEVICE — SCREW SPNL L14MM DIA4MM ANT CERV TI SELF DRL VAR ANG FULL: Type: IMPLANTABLE DEVICE | Site: SPINE CERVICAL | Status: FUNCTIONAL

## 2020-11-30 DEVICE — IMPLANTABLE DEVICE: Type: IMPLANTABLE DEVICE | Site: SPINE CERVICAL | Status: FUNCTIONAL

## 2020-11-30 RX ORDER — FERROUS SULFATE, DRIED 160(50) MG
1 TABLET, EXTENDED RELEASE ORAL
Status: DISCONTINUED | OUTPATIENT
Start: 2020-12-01 | End: 2020-12-01 | Stop reason: HOSPADM

## 2020-11-30 RX ORDER — MIDAZOLAM HYDROCHLORIDE 1 MG/ML
0.5 INJECTION, SOLUTION INTRAMUSCULAR; INTRAVENOUS
Status: DISCONTINUED | OUTPATIENT
Start: 2020-11-30 | End: 2020-11-30 | Stop reason: HOSPADM

## 2020-11-30 RX ORDER — DIPHENHYDRAMINE HCL 25 MG
25 CAPSULE ORAL
Status: DISCONTINUED | OUTPATIENT
Start: 2020-11-30 | End: 2020-12-01 | Stop reason: HOSPADM

## 2020-11-30 RX ORDER — ONDANSETRON 2 MG/ML
4 INJECTION INTRAMUSCULAR; INTRAVENOUS
Status: DISCONTINUED | OUTPATIENT
Start: 2020-11-30 | End: 2020-12-01 | Stop reason: HOSPADM

## 2020-11-30 RX ORDER — SODIUM CHLORIDE 0.9 % (FLUSH) 0.9 %
5-40 SYRINGE (ML) INJECTION AS NEEDED
Status: DISCONTINUED | OUTPATIENT
Start: 2020-11-30 | End: 2020-12-01 | Stop reason: HOSPADM

## 2020-11-30 RX ORDER — KETAMINE HYDROCHLORIDE 10 MG/ML
INJECTION, SOLUTION INTRAMUSCULAR; INTRAVENOUS AS NEEDED
Status: DISCONTINUED | OUTPATIENT
Start: 2020-11-30 | End: 2020-11-30 | Stop reason: HOSPADM

## 2020-11-30 RX ORDER — PHENYLEPHRINE HCL IN 0.9% NACL 0.4MG/10ML
SYRINGE (ML) INTRAVENOUS AS NEEDED
Status: DISCONTINUED | OUTPATIENT
Start: 2020-11-30 | End: 2020-11-30 | Stop reason: HOSPADM

## 2020-11-30 RX ORDER — OXYCODONE AND ACETAMINOPHEN 5; 325 MG/1; MG/1
1 TABLET ORAL AS NEEDED
Status: DISCONTINUED | OUTPATIENT
Start: 2020-11-30 | End: 2020-11-30 | Stop reason: HOSPADM

## 2020-11-30 RX ORDER — OXYCODONE HYDROCHLORIDE 5 MG/1
10 TABLET ORAL
Status: DISCONTINUED | OUTPATIENT
Start: 2020-11-30 | End: 2020-12-01 | Stop reason: HOSPADM

## 2020-11-30 RX ORDER — ACETAMINOPHEN 325 MG/1
650 TABLET ORAL ONCE
Status: COMPLETED | OUTPATIENT
Start: 2020-11-30 | End: 2020-11-30

## 2020-11-30 RX ORDER — SODIUM CHLORIDE 0.9 % (FLUSH) 0.9 %
5-40 SYRINGE (ML) INJECTION EVERY 8 HOURS
Status: DISCONTINUED | OUTPATIENT
Start: 2020-11-30 | End: 2020-11-30 | Stop reason: HOSPADM

## 2020-11-30 RX ORDER — HYDROCHLOROTHIAZIDE 25 MG/1
12.5 TABLET ORAL DAILY
Status: DISCONTINUED | OUTPATIENT
Start: 2020-12-01 | End: 2020-12-01 | Stop reason: HOSPADM

## 2020-11-30 RX ORDER — SODIUM CHLORIDE, SODIUM LACTATE, POTASSIUM CHLORIDE, CALCIUM CHLORIDE 600; 310; 30; 20 MG/100ML; MG/100ML; MG/100ML; MG/100ML
125 INJECTION, SOLUTION INTRAVENOUS CONTINUOUS
Status: DISCONTINUED | OUTPATIENT
Start: 2020-11-30 | End: 2020-11-30 | Stop reason: HOSPADM

## 2020-11-30 RX ORDER — LISINOPRIL 5 MG/1
2.5 TABLET ORAL DAILY
Status: DISCONTINUED | OUTPATIENT
Start: 2020-12-01 | End: 2020-12-01 | Stop reason: HOSPADM

## 2020-11-30 RX ORDER — SODIUM CHLORIDE 9 MG/ML
50 INJECTION, SOLUTION INTRAVENOUS CONTINUOUS
Status: DISCONTINUED | OUTPATIENT
Start: 2020-11-30 | End: 2020-12-01 | Stop reason: HOSPADM

## 2020-11-30 RX ORDER — CEFAZOLIN SODIUM/WATER 2 G/20 ML
2 SYRINGE (ML) INTRAVENOUS ONCE
Status: COMPLETED | OUTPATIENT
Start: 2020-11-30 | End: 2020-11-30

## 2020-11-30 RX ORDER — NEOSTIGMINE METHYLSULFATE 1 MG/ML
INJECTION INTRAVENOUS AS NEEDED
Status: DISCONTINUED | OUTPATIENT
Start: 2020-11-30 | End: 2020-11-30 | Stop reason: HOSPADM

## 2020-11-30 RX ORDER — NALOXONE HYDROCHLORIDE 0.4 MG/ML
0.4 INJECTION, SOLUTION INTRAMUSCULAR; INTRAVENOUS; SUBCUTANEOUS AS NEEDED
Status: DISCONTINUED | OUTPATIENT
Start: 2020-11-30 | End: 2020-12-01 | Stop reason: HOSPADM

## 2020-11-30 RX ORDER — MORPHINE SULFATE 10 MG/ML
2 INJECTION, SOLUTION INTRAMUSCULAR; INTRAVENOUS
Status: DISCONTINUED | OUTPATIENT
Start: 2020-11-30 | End: 2020-11-30 | Stop reason: HOSPADM

## 2020-11-30 RX ORDER — ONDANSETRON 2 MG/ML
INJECTION INTRAMUSCULAR; INTRAVENOUS AS NEEDED
Status: DISCONTINUED | OUTPATIENT
Start: 2020-11-30 | End: 2020-11-30 | Stop reason: HOSPADM

## 2020-11-30 RX ORDER — MIDAZOLAM HYDROCHLORIDE 1 MG/ML
INJECTION, SOLUTION INTRAMUSCULAR; INTRAVENOUS
Status: COMPLETED
Start: 2020-11-30 | End: 2020-11-30

## 2020-11-30 RX ORDER — DIPHENHYDRAMINE HYDROCHLORIDE 50 MG/ML
12.5 INJECTION, SOLUTION INTRAMUSCULAR; INTRAVENOUS AS NEEDED
Status: DISCONTINUED | OUTPATIENT
Start: 2020-11-30 | End: 2020-11-30 | Stop reason: HOSPADM

## 2020-11-30 RX ORDER — HYDROMORPHONE HYDROCHLORIDE 1 MG/ML
0.5 INJECTION, SOLUTION INTRAMUSCULAR; INTRAVENOUS; SUBCUTANEOUS
Status: DISCONTINUED | OUTPATIENT
Start: 2020-11-30 | End: 2020-12-01 | Stop reason: HOSPADM

## 2020-11-30 RX ORDER — FENTANYL CITRATE 50 UG/ML
INJECTION, SOLUTION INTRAMUSCULAR; INTRAVENOUS
Status: COMPLETED
Start: 2020-11-30 | End: 2020-11-30

## 2020-11-30 RX ORDER — MIDAZOLAM HYDROCHLORIDE 1 MG/ML
1 INJECTION, SOLUTION INTRAMUSCULAR; INTRAVENOUS AS NEEDED
Status: DISCONTINUED | OUTPATIENT
Start: 2020-11-30 | End: 2020-11-30 | Stop reason: HOSPADM

## 2020-11-30 RX ORDER — DIAZEPAM 5 MG/1
5 TABLET ORAL
Status: DISCONTINUED | OUTPATIENT
Start: 2020-11-30 | End: 2020-12-01 | Stop reason: HOSPADM

## 2020-11-30 RX ORDER — ACETAMINOPHEN 325 MG/1
650 TABLET ORAL EVERY 6 HOURS
Status: DISCONTINUED | OUTPATIENT
Start: 2020-11-30 | End: 2020-12-01 | Stop reason: HOSPADM

## 2020-11-30 RX ORDER — DEXAMETHASONE SODIUM PHOSPHATE 4 MG/ML
INJECTION, SOLUTION INTRA-ARTICULAR; INTRALESIONAL; INTRAMUSCULAR; INTRAVENOUS; SOFT TISSUE AS NEEDED
Status: DISCONTINUED | OUTPATIENT
Start: 2020-11-30 | End: 2020-11-30 | Stop reason: HOSPADM

## 2020-11-30 RX ORDER — ROSUVASTATIN CALCIUM 10 MG/1
10 TABLET, COATED ORAL DAILY
Status: DISCONTINUED | OUTPATIENT
Start: 2020-12-01 | End: 2020-12-01 | Stop reason: HOSPADM

## 2020-11-30 RX ORDER — LIDOCAINE HYDROCHLORIDE 10 MG/ML
0.1 INJECTION, SOLUTION EPIDURAL; INFILTRATION; INTRACAUDAL; PERINEURAL AS NEEDED
Status: DISCONTINUED | OUTPATIENT
Start: 2020-11-30 | End: 2020-11-30 | Stop reason: HOSPADM

## 2020-11-30 RX ORDER — SODIUM CHLORIDE 0.9 % (FLUSH) 0.9 %
5-40 SYRINGE (ML) INJECTION EVERY 8 HOURS
Status: DISCONTINUED | OUTPATIENT
Start: 2020-11-30 | End: 2020-12-01 | Stop reason: HOSPADM

## 2020-11-30 RX ORDER — FENTANYL CITRATE 50 UG/ML
50 INJECTION, SOLUTION INTRAMUSCULAR; INTRAVENOUS AS NEEDED
Status: DISCONTINUED | OUTPATIENT
Start: 2020-11-30 | End: 2020-11-30 | Stop reason: HOSPADM

## 2020-11-30 RX ORDER — ROCURONIUM BROMIDE 10 MG/ML
INJECTION, SOLUTION INTRAVENOUS AS NEEDED
Status: DISCONTINUED | OUTPATIENT
Start: 2020-11-30 | End: 2020-11-30 | Stop reason: HOSPADM

## 2020-11-30 RX ORDER — LIDOCAINE HYDROCHLORIDE 20 MG/ML
INJECTION, SOLUTION EPIDURAL; INFILTRATION; INTRACAUDAL; PERINEURAL AS NEEDED
Status: DISCONTINUED | OUTPATIENT
Start: 2020-11-30 | End: 2020-11-30 | Stop reason: HOSPADM

## 2020-11-30 RX ORDER — DEXAMETHASONE SODIUM PHOSPHATE 4 MG/ML
4 INJECTION, SOLUTION INTRA-ARTICULAR; INTRALESIONAL; INTRAMUSCULAR; INTRAVENOUS; SOFT TISSUE EVERY 8 HOURS
Status: DISCONTINUED | OUTPATIENT
Start: 2020-11-30 | End: 2020-12-01 | Stop reason: HOSPADM

## 2020-11-30 RX ORDER — OXYCODONE HYDROCHLORIDE 5 MG/1
5 TABLET ORAL
Status: DISCONTINUED | OUTPATIENT
Start: 2020-11-30 | End: 2020-12-01 | Stop reason: HOSPADM

## 2020-11-30 RX ORDER — GLYCOPYRROLATE 0.2 MG/ML
INJECTION INTRAMUSCULAR; INTRAVENOUS AS NEEDED
Status: DISCONTINUED | OUTPATIENT
Start: 2020-11-30 | End: 2020-11-30 | Stop reason: HOSPADM

## 2020-11-30 RX ORDER — AMOXICILLIN 250 MG
1 CAPSULE ORAL 2 TIMES DAILY
Status: DISCONTINUED | OUTPATIENT
Start: 2020-11-30 | End: 2020-12-01 | Stop reason: HOSPADM

## 2020-11-30 RX ORDER — PROPOFOL 10 MG/ML
INJECTION, EMULSION INTRAVENOUS AS NEEDED
Status: DISCONTINUED | OUTPATIENT
Start: 2020-11-30 | End: 2020-11-30 | Stop reason: HOSPADM

## 2020-11-30 RX ORDER — SODIUM CHLORIDE 0.9 % (FLUSH) 0.9 %
5-40 SYRINGE (ML) INJECTION AS NEEDED
Status: DISCONTINUED | OUTPATIENT
Start: 2020-11-30 | End: 2020-11-30 | Stop reason: HOSPADM

## 2020-11-30 RX ORDER — FENTANYL CITRATE 50 UG/ML
INJECTION, SOLUTION INTRAMUSCULAR; INTRAVENOUS AS NEEDED
Status: DISCONTINUED | OUTPATIENT
Start: 2020-11-30 | End: 2020-11-30 | Stop reason: HOSPADM

## 2020-11-30 RX ORDER — FENTANYL CITRATE 50 UG/ML
25 INJECTION, SOLUTION INTRAMUSCULAR; INTRAVENOUS
Status: COMPLETED | OUTPATIENT
Start: 2020-11-30 | End: 2020-11-30

## 2020-11-30 RX ORDER — ONDANSETRON 2 MG/ML
4 INJECTION INTRAMUSCULAR; INTRAVENOUS AS NEEDED
Status: DISCONTINUED | OUTPATIENT
Start: 2020-11-30 | End: 2020-11-30 | Stop reason: HOSPADM

## 2020-11-30 RX ORDER — FAMOTIDINE 20 MG/1
20 TABLET, FILM COATED ORAL DAILY
Status: DISCONTINUED | OUTPATIENT
Start: 2020-12-01 | End: 2020-12-01 | Stop reason: HOSPADM

## 2020-11-30 RX ORDER — HYDROMORPHONE HYDROCHLORIDE 1 MG/ML
0.2 INJECTION, SOLUTION INTRAMUSCULAR; INTRAVENOUS; SUBCUTANEOUS
Status: DISCONTINUED | OUTPATIENT
Start: 2020-11-30 | End: 2020-11-30 | Stop reason: HOSPADM

## 2020-11-30 RX ORDER — MIDAZOLAM HYDROCHLORIDE 1 MG/ML
INJECTION, SOLUTION INTRAMUSCULAR; INTRAVENOUS AS NEEDED
Status: DISCONTINUED | OUTPATIENT
Start: 2020-11-30 | End: 2020-11-30 | Stop reason: HOSPADM

## 2020-11-30 RX ORDER — HYDROMORPHONE HYDROCHLORIDE 1 MG/ML
INJECTION, SOLUTION INTRAMUSCULAR; INTRAVENOUS; SUBCUTANEOUS AS NEEDED
Status: DISCONTINUED | OUTPATIENT
Start: 2020-11-30 | End: 2020-11-30 | Stop reason: HOSPADM

## 2020-11-30 RX ORDER — SODIUM CHLORIDE 9 MG/ML
25 INJECTION, SOLUTION INTRAVENOUS CONTINUOUS
Status: DISCONTINUED | OUTPATIENT
Start: 2020-11-30 | End: 2020-11-30 | Stop reason: HOSPADM

## 2020-11-30 RX ADMIN — FENTANYL CITRATE 25 MCG: 50 INJECTION, SOLUTION INTRAMUSCULAR; INTRAVENOUS at 14:27

## 2020-11-30 RX ADMIN — SODIUM CHLORIDE 100 ML/HR: 9 INJECTION, SOLUTION INTRAVENOUS at 14:45

## 2020-11-30 RX ADMIN — ROCURONIUM BROMIDE 10 MG: 10 SOLUTION INTRAVENOUS at 12:20

## 2020-11-30 RX ADMIN — ACETAMINOPHEN 650 MG: 325 TABLET ORAL at 18:16

## 2020-11-30 RX ADMIN — Medication 40 MCG: at 12:21

## 2020-11-30 RX ADMIN — HYDROMORPHONE HYDROCHLORIDE 0.5 MG: 1 INJECTION, SOLUTION INTRAMUSCULAR; INTRAVENOUS; SUBCUTANEOUS at 11:13

## 2020-11-30 RX ADMIN — LIDOCAINE HYDROCHLORIDE 100 MG: 20 INJECTION, SOLUTION EPIDURAL; INFILTRATION; INTRACAUDAL; PERINEURAL at 10:50

## 2020-11-30 RX ADMIN — FENTANYL CITRATE 100 MCG: 50 INJECTION, SOLUTION INTRAMUSCULAR; INTRAVENOUS at 10:50

## 2020-11-30 RX ADMIN — MIDAZOLAM 0.5 MG: 1 INJECTION INTRAMUSCULAR; INTRAVENOUS at 14:16

## 2020-11-30 RX ADMIN — ACETAMINOPHEN 650 MG: 325 TABLET ORAL at 10:02

## 2020-11-30 RX ADMIN — DEXAMETHASONE SODIUM PHOSPHATE 8 MG: 4 INJECTION, SOLUTION INTRAMUSCULAR; INTRAVENOUS at 11:01

## 2020-11-30 RX ADMIN — Medication 40 MCG: at 12:18

## 2020-11-30 RX ADMIN — GLYCOPYRROLATE 0.4 MG: 0.2 INJECTION, SOLUTION INTRAMUSCULAR; INTRAVENOUS at 13:32

## 2020-11-30 RX ADMIN — DOCUSATE SODIUM 50 MG AND SENNOSIDES 8.6 MG 1 TABLET: 8.6; 5 TABLET, FILM COATED ORAL at 18:16

## 2020-11-30 RX ADMIN — DEXAMETHASONE SODIUM PHOSPHATE 4 MG: 4 INJECTION, SOLUTION INTRAMUSCULAR; INTRAVENOUS at 18:17

## 2020-11-30 RX ADMIN — FENTANYL CITRATE 25 MCG: 50 INJECTION, SOLUTION INTRAMUSCULAR; INTRAVENOUS at 15:01

## 2020-11-30 RX ADMIN — MIDAZOLAM HYDROCHLORIDE 0.5 MG: 1 INJECTION, SOLUTION INTRAMUSCULAR; INTRAVENOUS at 14:16

## 2020-11-30 RX ADMIN — MIDAZOLAM 2 MG: 1 INJECTION INTRAMUSCULAR; INTRAVENOUS at 10:44

## 2020-11-30 RX ADMIN — Medication 2 G: at 11:09

## 2020-11-30 RX ADMIN — FENTANYL CITRATE 25 MCG: 50 INJECTION, SOLUTION INTRAMUSCULAR; INTRAVENOUS at 14:39

## 2020-11-30 RX ADMIN — FENTANYL CITRATE 25 MCG: 50 INJECTION, SOLUTION INTRAMUSCULAR; INTRAVENOUS at 14:15

## 2020-11-30 RX ADMIN — PROPOFOL 150 MG: 10 INJECTION, EMULSION INTRAVENOUS at 10:50

## 2020-11-30 RX ADMIN — ROCURONIUM BROMIDE 50 MG: 10 SOLUTION INTRAVENOUS at 10:50

## 2020-11-30 RX ADMIN — NEOSTIGMINE METHYLSULFATE 3 MG: 1 INJECTION, SOLUTION INTRAVENOUS at 13:32

## 2020-11-30 RX ADMIN — SODIUM CHLORIDE, POTASSIUM CHLORIDE, SODIUM LACTATE AND CALCIUM CHLORIDE: 600; 310; 30; 20 INJECTION, SOLUTION INTRAVENOUS at 10:22

## 2020-11-30 RX ADMIN — KETAMINE HYDROCHLORIDE 30 MG: 10 INJECTION, SOLUTION INTRAMUSCULAR; INTRAVENOUS at 10:50

## 2020-11-30 RX ADMIN — HYDROMORPHONE HYDROCHLORIDE 0.5 MG: 1 INJECTION, SOLUTION INTRAMUSCULAR; INTRAVENOUS; SUBCUTANEOUS at 13:53

## 2020-11-30 RX ADMIN — ROCURONIUM BROMIDE 10 MG: 10 SOLUTION INTRAVENOUS at 11:41

## 2020-11-30 RX ADMIN — KETAMINE HYDROCHLORIDE 20 MG: 10 INJECTION, SOLUTION INTRAMUSCULAR; INTRAVENOUS at 12:01

## 2020-11-30 RX ADMIN — ONDANSETRON HYDROCHLORIDE 4 MG: 2 INJECTION, SOLUTION INTRAMUSCULAR; INTRAVENOUS at 13:32

## 2020-11-30 NOTE — ROUTINE PROCESS
Patient: Celena Velasquez MRN: [id number]  SSN: [id number]   YOB: 1940  Age: 68 y.o. Sex: female     Patient is status post Procedure(s):  C3-4 C4-5 C5-6 ANTERIOR CERVICAL DISCECTOMY AND FUSION WITH DEPUY PEEK ALLOGRAFT AND SKYLINE PLATE. Surgeon(s) and Role:     * Serg Dempsey MD - Primary    Local/Dose/Irrigation:                          Hemovac Anterior Neck (Active)   Site Assessment Clean, dry, & intact 11/30/20 1310   Dressing Status Clean, dry, & intact 11/30/20 1310   Drainage Description Sanguinous 11/30/20 1310   Status Patent; Charged;Draining 11/30/20 1310      Airway - Endotracheal Tube 11/30/20 Oral (Active)                   Dressing/Packing:  Wound Neck Surgical incision and drain site-Dressing/Treatment: Skin glue(island dressing) (11/30/20 1333)    Splint/Cast:  ]    Other:  Bernard; scds; hemovac drain; cervical collar

## 2020-11-30 NOTE — PERIOP NOTES
1515 VS stable. Awake and alert. Resting comfortably. Patient denies nausea. Pain improved. No signs of excessive bleeding. Tolerating ice chips without difficulty. Ready to transfer from PACU. 1540 TRANSFER - OUT REPORT:    Verbal report given to Hayes(name) on [place]  being transferred to [facility](unit) for routine post - op       Report consisted of patients Situation, Background, Assessment and   Recommendations(SBAR). Time Pre op antibiotic given:1100  Anesthesia Stop time: 4663    Information from the following report(s) SBAR, OR Summary, Intake/Output, MAR and Cardiac Rhythm NSR. was reviewed with the receiving nurse. Opportunity for questions and clarification was provided. Is the patient on 02? YES       L/Min 2    Is the patient on a monitor? NO    Is the nurse transporting with the patient? NO    Surgical Waiting Area notified of patient's transfer from PACU?  YES      The following personal items collected during your admission accompanied patient upon transfer:   Dental Appliance:    Vision:    Hearing Aid:    Jewelry:    Clothing: Clothing: With patient  Other Valuables:    Valuables sent to safe:

## 2020-11-30 NOTE — ANESTHESIA PREPROCEDURE EVALUATION
Relevant Problems   No relevant active problems       Anesthetic History   No history of anesthetic complications            Review of Systems / Medical History  Patient summary reviewed, nursing notes reviewed and pertinent labs reviewed    Pulmonary  Within defined limits                 Neuro/Psych   Within defined limits           Cardiovascular    Hypertension                   GI/Hepatic/Renal  Within defined limits              Endo/Other    Diabetes         Other Findings              Physical Exam    Airway  Mallampati: II  TM Distance: > 6 cm  Neck ROM: normal range of motion   Mouth opening: Normal     Cardiovascular  Regular rate and rhythm,  S1 and S2 normal,  no murmur, click, rub, or gallop             Dental  No notable dental hx       Pulmonary  Breath sounds clear to auscultation               Abdominal  GI exam deferred       Other Findings            Anesthetic Plan    ASA: 2  Anesthesia type: general          Induction: Intravenous  Anesthetic plan and risks discussed with: Patient

## 2020-11-30 NOTE — BRIEF OP NOTE
Brief Postoperative Note    Patient: Vinie Sandifer  YOB: 1940  MRN: [id number]    Date of Procedure: 11/30/2020     Pre-Op Diagnosis: CERVICAL STENOSIS WITH MYELOPATHY    Post-Op Diagnosis: Same as preoperative diagnosis. Procedure(s):  C3-4 C4-5 C5-6 ANTERIOR CERVICAL DISCECTOMY AND FUSION WITH DEPUY PEEK ALLOGRAFT AND SKYLINE PLATE    Surgeon(s):  Priscilla Jay MD    Surgical Assistant: Surg Asst-1: Anny Hudson    Anesthesia: General     Estimated Blood Loss (mL): less than 50     Complications: None    Specimens: * No specimens in log *     Implants:   Implant Name Type Inv. Item Serial No.  Lot No. LRB No. Used Action   GRAFT BONE SUB 5CC DEMIN BONE MTRX PRIM HD - [id number]  GRAFT BONE SUB 5CC DEMIN BONE MTRX PRIM HD [id number] [employer] NA N/A 1 Implanted   ACIS ProTi oksana/m 8mm h   NA DEPUY SPINE [id number] N/A 1 Implanted   ACIS ProTi OKSANA/M 8 mm H Graft  NA DEPUY SPINE [id number] N/A 1 Implanted   ACIS ProTi OKSANA/M 8 mm H   NA DEPUY SPINE [id number] N/A 1 Implanted   PLATE SPNL D35HU ANT BILAT CERV TI 3 LEV REYMUNDO HYBRID SKYLINE - SNA  PLATE SPNL B04DA ANT BILAT CERV TI 3 LEV REYMUNDO HYBRID SKYLINE NA JNJ DEPUY SYNTHES SPINE_WD NA N/A 1 Implanted   SCREW SPNL L14MM DIA4MM ANT CERV TI SELF DRL KRUNAL ANG FULL - SNA  SCREW SPNL L14MM DIA4MM ANT CERV TI SELF DRL KRUNAL ANG FULL NA JNJ DEPUY SYNTHES SPINE_WD NA N/A 6 Implanted   SCREW SPNL L14MM DIA4MM ANT CERV TI SELF DRL CONSTRN - SNA  SCREW SPNL L14MM DIA4MM ANT CERV TI SELF DRL CONSTRN NA JN DEPUY SYNTHES SPINE_WD NA N/A 2 Implanted       Drains:   Hemovac Anterior Neck (Active)   Site Assessment Clean, dry, & intact 11/30/20 1310   Dressing Status Clean, dry, & intact 11/30/20 1310   Drainage Description Sanguinous 11/30/20 1310   Status Patent; Charged;Draining 11/30/20 1310       Findings: stenosis    Electronically Signed by Shan Allen MD on 11/30/2020 at 1:34 PM

## 2020-11-30 NOTE — ANESTHESIA POSTPROCEDURE EVALUATION
Procedure(s):  C3-4 C4-5 C5-6 ANTERIOR CERVICAL DISCECTOMY AND FUSION WITH DEPUY PEEK ALLOGRAFT AND SKYLINE PLATE. general    Anesthesia Post Evaluation        Patient location during evaluation: PACU  Patient participation: complete - patient participated  Level of consciousness: awake  Pain management: adequate  Airway patency: patent  Anesthetic complications: no  Cardiovascular status: hemodynamically stable  Respiratory status: acceptable  Hydration status: acceptable  Comments: I have seen and evaluated the patient. The patient is ready for PACU discharge. Sharlene Gaytan, DO                         INITIAL Post-op Vital signs:   Vitals Value Taken Time   /77 11/30/2020  3:15 PM   Temp 36.5 °C (97.7 °F) 11/30/2020  3:00 PM   Pulse 100 11/30/2020  3:28 PM   Resp 27 11/30/2020  3:28 PM   SpO2 99 % 11/30/2020  3:28 PM   Vitals shown include unvalidated device data.

## 2020-11-30 NOTE — PROGRESS NOTES
Primary Nurse Jese Donovan RN and Gina Garrison RN performed a dual skin assessment on this patient No impairment noted  Roger score is 19

## 2020-11-30 NOTE — PERIOP NOTES
Family updated on progression of case    Surgifoam added to sterile field   Lot number [id number] expires 7/2/2024    Floseal added to sterile field  Lot number [id number] expires 3/20/2022

## 2020-12-01 VITALS
TEMPERATURE: 99.4 F | HEART RATE: 88 BPM | BODY MASS INDEX: 29.69 KG/M2 | SYSTOLIC BLOOD PRESSURE: 164 MMHG | DIASTOLIC BLOOD PRESSURE: 92 MMHG | RESPIRATION RATE: 18 BRPM | OXYGEN SATURATION: 98 % | WEIGHT: 189.6 LBS

## 2020-12-01 PROCEDURE — 97535 SELF CARE MNGMENT TRAINING: CPT

## 2020-12-01 PROCEDURE — 74011250636 HC RX REV CODE- 250/636: Performed by: NEUROLOGICAL SURGERY

## 2020-12-01 PROCEDURE — 97165 OT EVAL LOW COMPLEX 30 MIN: CPT

## 2020-12-01 PROCEDURE — 77010033678 HC OXYGEN DAILY

## 2020-12-01 PROCEDURE — 97161 PT EVAL LOW COMPLEX 20 MIN: CPT

## 2020-12-01 PROCEDURE — 74011250637 HC RX REV CODE- 250/637: Performed by: NEUROLOGICAL SURGERY

## 2020-12-01 RX ORDER — ACETAMINOPHEN 325 MG/1
650 TABLET ORAL
Qty: 1 TAB | Refills: 0 | Status: SHIPPED
Start: 2020-12-01

## 2020-12-01 RX ADMIN — Medication 10 ML: at 07:15

## 2020-12-01 RX ADMIN — Medication 1 TABLET: at 07:15

## 2020-12-01 RX ADMIN — ACETAMINOPHEN 650 MG: 325 TABLET ORAL at 07:15

## 2020-12-01 RX ADMIN — DEXAMETHASONE SODIUM PHOSPHATE 4 MG: 4 INJECTION, SOLUTION INTRAMUSCULAR; INTRAVENOUS at 03:32

## 2020-12-01 RX ADMIN — ACETAMINOPHEN 650 MG: 325 TABLET ORAL at 00:21

## 2020-12-01 RX ADMIN — DOCUSATE SODIUM 50 MG AND SENNOSIDES 8.6 MG 1 TABLET: 8.6; 5 TABLET, FILM COATED ORAL at 09:07

## 2020-12-01 RX ADMIN — FAMOTIDINE 20 MG: 20 TABLET, FILM COATED ORAL at 09:07

## 2020-12-01 RX ADMIN — HYDROCHLOROTHIAZIDE 12.5 MG: 25 TABLET ORAL at 09:07

## 2020-12-01 RX ADMIN — LISINOPRIL 2.5 MG: 5 TABLET ORAL at 09:07

## 2020-12-01 RX ADMIN — PHENOL 1 SPRAY: 1.4 SPRAY ORAL at 00:22

## 2020-12-01 NOTE — PROGRESS NOTES
Orthopedic Spine Discharge Note      Patient Name: Zulay Clifton    : 1940         MRN: [id number]    Admit Diagnosis: Cervical stenosis of spinal canal [M48.02]    Surgery: Procedure(s):  C3-4 C4-5 C5-6 ANTERIOR CERVICAL DISCECTOMY AND FUSION WITH DEPUY PEEK ALLOGRAFT AND SKYLINE PLATE          Discharge Disposition: home     Discharge Condition: Good     Activity at Discharge: See surgical instructions      Discharge Paperwork Signed & Completed      [x] yes      [] no    Prescriptions Given to Patient      [x] yes      [] no    Supplies Given to Patient      [x] yes      [] no    Dressing Change/ Wound Care Teaching      [x] yes      [] no    Care Plans Completed      [x] yes      [] no    IV Lines Removed      [x] yes      [] no        ============  ONLY FOR SURGICAL PATIENTS BELOW ============    Cervical Spine D/C Video: Cervical Spine Discharge Video Link     Laminectomy D/C Video: Laminectomy Discharge Video Link    Spinal Fusion D/C Video: Spinal Fusion Discharge Video Link     For Cervical Spine, Spinal Fusion & Laminectomy Surgery Patients Only:     Discharge Video Viewed by Patient and/or Family              [x] yes             [] no             Discharging RN, Barry Little, has explained the discharge paperwork to the patient and/or family, completed patient and/or family teaching with patient and/or family teach back, and has answered any and all patient and/or family questions before discharge.     Signed by: Barry Little                                            2020 at 10:59 AM

## 2020-12-01 NOTE — DISCHARGE INSTRUCTIONS
After Hospital Care Plan:  Discharge Instructions Cervical (Neck) Spine Surgery Dr. Yamilex Kimbrough    Patient Name: Angeles Chen    Date of procedure: 11/30/2020  Date of discharge: 12/1/2020    Procedure: Procedure(s):  C3-4 C4-5 C5-6 ANTERIOR CERVICAL DISCECTOMY AND FUSION WITH DEPUY PEEK ALLOGRAFT AND SKYLINE PLATE  PCP: Anni Pandya MD      Follow up appointments  -follow up with Dr. Yamilex Kimbrough in 2 weeks. [phone] to make an appointment as soon as you get home from the hospital.    When to call your Spine Surgeon:  -Difficulty swallowing that is worse than when you left the hospital.  -Signs of infection-if your incision is red; continues to have drainage; drainage has a foul odor or if you have a persistent fever over 101 degrees for 24 hours  -nausea or vomiting, severe headache  -changes in sensation in your arms or legs (numbness, tingling, loss of color)  -increased weakness-greater than before your surgery  -severe pain or pain not relieved by medications  -Signs of a blood clot in your leg-calf pain, tenderness, redness, swelling of lower leg    When to call your Primary Care Physician:  -Concerns about medical conditions such as diabetes, high blood pressure, asthma, congestive heart failure  -Call if blood sugars are elevated, persistent headache or dizziness, coughing or congestion, constipation or diarrhea, burning with urination, abnormal heart rate    When to call 911 and go to the nearest emergency room:  -acute onset of chest pain, shortness of breath, difficulty breathing    Activity  - You are going home a well person, be as active as possible. Your only exercise should be walking. Start with short frequent walks and increase your walking distance each day.  -Limit the amount of time you sit to 20-30 minute intervals. Sitting for prolonged periods of time will be uncomfortable for you following surgery.   - Do NOT lift anything over 5 pounds  -From now on, even when lifting light weight, bend with your knees and not your back.  -Do NOT do any neck exercises until you have been instructed by your doctor  -When you are in bed, you may lay on your back or on either side. Do NOT lie on your stomach    Cervical Collar (Aspen Collar)  -You are required to wear your cervical collar at all times; except when showering. You may remove the collar long enough to change the pads when needed and to change your dressing each day. -Do not bend or twist when your collar is off. It is best to have someone assist you when changing the pads and your dressing to prevent you from bending your neck. - Clean the pads on your neck collar every day by hand washing with a mild soap and water. Pat them dry with a towel and lay out to air dry. Do not use heat to dry the pads. Diet  -resume usual diet; drink plenty of fluids; eat foods high in fiber  -It is important to have regular bowel movements. Pain medications may cause constipation. You may want to take a stool softener (such as Senokot-S or Colace) to prevent constipation.  -If constipation occurs, take a laxative (such as Dulcolax tablets, Milk of Magnesia, or a suppository). Laxatives should only be used if the above preventable measures have failed and you still have not had a bowel movement after three days    Driving  -You may not drive or return to work until instructed by your physician. However, you may ride in the car for short periods of time. Incision Care  -You may take brief showers but do not run the water run directly onto the wound.  After showering or bathing gently blot the wound dry with a soft towel.  -Do not rub or apply any lotions or ointments to your incision site.   -Do not soak or scrub your wound; do not swim until the adhesive film has fallen off naturally  -Do not scratch, rub, or pick at the wound or skin glue, doing so may loosen the film before the wound is fully healed  -Stay out of direct sunlight and do not use tanning lamps     Showering  -You may shower in approximately 4 days after your surgery.    -Reminder- Make sure you put clean pads on your collar after your shower.    -Do not take a tub bath. Preventing blood clots  -You have been given T.E.D. stockings to wear. Continue to wear these for 7 days after your discharge. Put them on in the morning and take them off at night.    -They are used to increase your circulation and prevent blood clots from forming in your legs  -T. E.D. stockings can be machine washed, temperature not to exceed 160° F (71°C) and machine dried for 15 to 20 minutes, temperature not to exceed 250° F (121°C). Pain management  -Take pain medication as prescribed; decrease the amount you use as your pain lessens.  -Do not wait until you are in extreme pain to take your medication.  -Avoid alcoholic beverages while taking pain medication. Pain Medication Safety  DO:  -Read the Medication Guide   -Take your medicine exactly as prescribed   -Store your medicine away from children and in a safe place   -Flush unused medicine down the toilet   -Call your healthcare provider for medical advice about side effects. You may report side effects to FDA at 8-657-FDA-9171.   -Please be aware that many medications contain Tylenol. We do not want you to over medicate so please read the information below as a guide. Do not take more than 4 Grams of Tylenol in a 24 hour period.   (There are 1000 milligrams in one Gram)                                                                                                                                                                                                                                                                                                                                                                  Percocet contains 325 mg of Tylenol per tablet (do not take more than 12 tablets in 24 hours)  Lortab contains 500 mg of Tylenol per tablet (do not take more than 8 tablets in 24 hours)  Norco contains 325 mg of Tylenol per tablet (do not take more than 12 tablets in 24 hours). DO NOT:  -Do not give your medicine to others   -Do not take medicine unless it was prescribed for you   -Do not stop taking your medicine without talking to your healthcare provider   -Do not break, chew, crush, dissolve, or inject your medicine. If you cannot  swallow your medicine whole, talk to your healthcare provider.  -Do not drink alcohol while taking this medicine  -Do not take anti-inflammatory medications or aspirin unless instructed by your   physician.

## 2020-12-01 NOTE — OP NOTES
[address] [facility]  OPERATIVE REPORT    Name:  Nahun Sandhu  MR#:  [id number]  :  1940  ACCOUNT #:  [de-identified]  DATE OF SERVICE:  2020    PREOPERATIVE DIAGNOSIS:  High-grade cervical stenosis C3 through C6 with myelopathy. POSTOPERATIVE DIAGNOSIS:  High-grade cervical stenosis C3 through C6 with myelopathy. PROCEDURES PERFORMED:  C3-4, C4-5, C5-6 anterior cervical complete diskectomy with instrumented fusion C3 through 6 using Medtronic PEEK structural allograft and standalone Medtronic anterior [place] plate, use of operating microscope. SURGEON:  Agustina Barrios MD    ASSISTANT:  Cesar Berry. ANESTHESIA:  General endotracheal anesthesia. COMPLICATIONS:  None. SPECIMENS REMOVED:  None. IMPLANTS:  As noted above. ESTIMATED BLOOD LOSS:  50 mL. OPERATIVE INDICATION:  This is a 70-year-old female with progressive numbness in her hands, myelopathy, loss of balance. MRI showed high-grade stenosis with central disk herniation, C3-4, C4-5, C5-6 with cord compression. After discussing treatment options and risks of surgery, informed consent was obtained. OPERATION IN DETAIL:  The patient was taken to the operating room and placed under general endotracheal anesthesia. All necessary lines and monitors were placed. All pressure points were padded. Arms tucked by the side, neck in gentle extension. Shoulders were tapped down. The anterior cervical region was prepped and draped in a standard sterile fashion. Transverse incision was made from the midline to the left sternocleidomastoid with a skin knife, carried down with Bovie electrocautery in the subcutaneous tissue. Platysma was undermined rostrally and caudally. The plane medial to the sternocleidomastoid was carried down to the prevertebral space. Large superficial vein was ligated. Prevertebral space was cleaned off. Localizing x-ray was obtained. Her chin was quite low.   C3-4 was a bit of reach, but we were able to expose this, the anterior vertebral bodies of C3, C4, C5, C6. Self-retaining retractors were placed. The retractors were placed over C3-4. Distraction pins were placed at this level. The operating scope was brought into the field. Under the microscope, a complete diskectomy was performed at C3-4. There is a large central disk herniation as well as significant osteophytes at C3 that were removed. The spinal cord and spinal sac were widely decompressed down to the dura. The end plates were decorticated. An 8-mm PEEK spacer packed with demineralized bone matrix was then placed in the interspace and countersunk. The process was then repeated at C4-5 where there was also significant stenosis and compression of the spinal cord. The entire disk was removed. The foramen was widely decompressed. The PLL was removed. The thecal sac was widely decompressed under the microscope. The endplates were decorticated. An 8-mm spacer was placed and countersunk. I then adjusted the retractors in retraction again to C5-6. In a similar fashion, the C5-6 disk was removed. At this level, there was also a central disk herniation that was removed and a significant amount of ligamentous hypertrophy compressing the cord. Wide decompression was performed of the spinal cord and the neuroforamen was decorticated. An 8-mm graft was then placed and countersunk. Distraction pins were removed. Retractors were adjusted. Then under fluoroscopic guidance, a Depuy [facility] 54-mm anterior cervical plate was then placed. I then placed fixed 14-mm screws bilaterally at C6 and variable angles 14-mm screws bilaterally at C5, C4 and C3. There was good purchase of screws. Locking mechanisms were engaged. Fluoroscopy showed good position of the hardware in the anterior and lateral planes. The wound was copiously irrigated. Retractors were removed. Hemostasis was achieved.   A Hemovac drain was placed, brought out through a separate stab incision. The wound was then closed using 2-0 Vicryl to close the platysma, running 4-0 Monocryl in a subcuticular fashion. The wounds were cleaned and dried, dressed with sterile dressing. The patient was then placed in rigid collar, extubated and taken to recovery room in stable condition.       MD PARMINDER Jaramillo/[email or web address]/  D:  12/01/2020 7:27  T:  12/01/2020 11:33  JOB #:  [id number]

## 2020-12-01 NOTE — PROGRESS NOTES
OCCUPATIONAL THERAPY EVALUATION/DISCHARGE  Patient: Ruchi Villatoro (13 y.o. female)  Date: 12/1/2020  Primary Diagnosis: Cervical stenosis of spinal canal [M48.02]  Procedure(s) (LRB):  C3-4 C4-5 C5-6 ANTERIOR CERVICAL DISCECTOMY AND FUSION WITH DEPUY PEEK ALLOGRAFT AND SKYLINE PLATE (N/A) 1 Day Post-Op   Precautions:   Spinal    ASSESSMENT  Based on the objective data described below, the patient presents  at supervision with mobility  and min assist level with UB/LB self-care. Pt limited by cervical collar to safely don bra and don pull-over shirt (shirt had wide opening for head). Pt lives with supportive  who will be home to assist.  Pt has AE at home for LB self-care and plans on purchasing sock aide. Discussed how to safely shower, family has [place] shower to prevent water directly hitting incision. Reviewed donning/doffing cervical collar. Both verbalized understanding. Feel pt is safe for discharge. Current Level of Function (ADLs/self-care): Functional Outcome Measure: The patient scored 90/100 on the Barthel outcome measure*. Other factors to consider for discharge:      PLAN :  Recommend with staff:     Recommendation for discharge: (in order for the patient to meet his/her long term goals)  No skilled occupational therapy/ follow up rehabilitation needs identified at this time. This discharge recommendation:  A follow-up discussion with the attending provider and/or case management is planned    IF patient discharges home will need the following DME: none       SUBJECTIVE:   Patient stated I'm ready to go home.     OBJECTIVE DATA SUMMARY:   HISTORY:   Past Medical History:   Diagnosis Date    Arthritis     KNEES     Cataract     Diverticulosis     Hemorrhoids     Large carpet -like rectal polyps  seen on coloscopy  2015    Hyperlipidemia     PT STATES NOT     Hypertension     Onychomycosis     Prediabetes     Sciatica     Transient global amnesia     h/o      Past Surgical History:   Procedure Laterality Date    HX CATARACT REMOVAL Right     HX COLONOSCOPY  2014 12/2014: ext hemorrhoids, diverticulosis, carpet-like polyp (resected by Dr. Laly Cortez 2/12/15) Repeat due 8/2018     HX HIP REPLACEMENT Right     HX HIP REPLACEMENT Right     HX HYSTERECTOMY  1996    HX KNEE REPLACEMENT Right     HX KNEE REPLACEMENT Left 2019    HX VEIN STRIPPING      HX WISDOM TEETH EXTRACTION         Prior Level of Function/Environment/Context:   Expanded or extensive additional review of patient history:   Home Situation  Home Environment: Private residence  # Steps to Enter: 4  Rails to Enter: Yes  Hand Rails : Left  One/Two Story Residence: Two story, live on 1st floor  Living Alone: No  Support Systems: Spouse/Significant Other/Partner  Patient Expects to be Discharged to[de-identified] Private residence  Current DME Used/Available at Home: [address] [facility], [place], straight, Walker, rolling, Raised toilet seat      EXAMINATION OF PERFORMANCE DEFICITS:  Cognitive/Behavioral Status:  Neurologic State: Alert  Orientation Level: Oriented X4  Cognition: Appropriate decision making        Safety/Judgement: Awareness of environment    Skin: intact    Edema: none noted    Hearing:       Vision/Perceptual:                                     Range of Motion:    AROM: Generally decreased, functional                         Strength:    Strength: Generally decreased, functional                Coordination:  Coordination: Within functional limits  Fine Motor Skills-Upper: Left Intact; Right Intact    Gross Motor Skills-Upper: Left Intact; Right Intact    Tone & Sensation:    Tone: Normal  Sensation: Impaired(bilateral numbness (L>R))                      Balance:  Sitting: Intact  Standing: Intact; Without support    Functional Mobility and Transfers for ADLs:  Bed Mobility:  Rolling: Independent  Supine to Sit: Independent  Scooting: Independent    Transfers:  Sit to Stand: Supervision  Stand to Sit: Supervision  Bed to Chair: Supervision  Bathroom Mobility: Supervision/set up  Toilet Transfer : Supervision    ADL Assessment:  Feeding: Independent    Oral Facial Hygiene/Grooming: Independent         Upper Body Dressing: Minimum assistance    Lower Body Dressing: Minimum assistance; Additional time; Adaptive equipment    Toileting: Supervision                ADL Intervention and task modifications:       Cognitive Retraining  Safety/Judgement: Awareness of environment    Therapeutic Exercise:     Functional Measure:  Barthel Index:    Bathin  Bladder: 10  Bowels: 10  Groomin  Dressin  Feeding: 10  Mobility: 15  Stairs: 10  Toilet Use: 10  Transfer (Bed to Chair and Back): 15  Total: 90/100        The Barthel ADL Index: Guidelines  1. The index should be used as a record of what a patient does, not as a record of what a patient could do. 2. The main aim is to establish degree of independence from any help, physical or verbal, however minor and for whatever reason. 3. The need for supervision renders the patient not independent. 4. A patient's performance should be established using the best available evidence. Asking the patient, friends/relatives and nurses are the usual sources, but direct observation and common sense are also important. However direct testing is not needed. 5. Usually the patient's performance over the preceding 24-48 hours is important, but occasionally longer periods will be relevant. 6. Middle categories imply that the patient supplies over 50 per cent of the effort. 7. Use of aids to be independent is allowed. Tasha Cueto., Barthel, D.W. (2778). Functional evaluation: the Barthel Index. [facility] (14)2. MANDY Parra, Cathie Johnson., Ke Castellon., Bernard, [address] [place] [address] (). Measuring the change indisability after inpatient rehabilitation; comparison of the responsiveness of the Barthel Index and Functional [place] Measure.  Journal of Neurology, Neurosurgery, and Psychiatry, 66(4), [phone]. KENDALL Deal, RAVEN Luna, & Derick Gómez M.A. (2004.) Assessment of post-stroke quality of life in cost-effectiveness studies: The usefulness of the Barthel Index and the EuroQoL-5D. Quality of Life Research, 15, 408-96       Occupational Therapy Evaluation Charge Determination   History Examination Decision-Making   LOW Complexity : Brief history review  LOW Complexity : 1-3 performance deficits relating to physical, cognitive , or psychosocial skils that result in activity limitations and / or participation restrictions  LOW Complexity : No comorbidities that affect functional and no verbal or physical assistance needed to complete eval tasks       Based on the above components, the patient evaluation is determined to be of the following complexity level: LOW   Pain Rating:      Activity Tolerance:   Good    After treatment patient left in no apparent distress:    Sitting in chair, Call bell within reach and Caregiver / family present    COMMUNICATION/EDUCATION:   The patients plan of care was discussed with: Physical therapist and Registered nurse.      Thank you for this referral.  MARILU Veras/L  Time Calculation: 34 mins

## 2020-12-01 NOTE — DISCHARGE SUMMARY
Spine Discharge Summary    Patient ID:  Giovani Adams  [id number]  female  68 y.o.  1940    Admit date: 11/30/2020    Discharge date: 12/1/2020    Admitting Physician: Darshana Lamb MD     Consulting Physician(s):   Treatment Team: Care Manager: Tally Boas    Date of Surgery:   11/30/2020     Preoperative Diagnosis:  CERVICAL STENOSIS WITH MYELOPATHY    Postoperative Diagnosis:   CERVICAL STENOSIS WITH MYELOPATHY    Procedure(s):  C3-4 C4-5 C5-6 ANTERIOR CERVICAL DISCECTOMY AND FUSION WITH DEPUY PEEK ALLOGRAFT AND SKYLINE PLATE     Anesthesia Type:   General     Surgeon: Red Buitrago MD                            HPI:  Pt is a 68 y.o. female who has a history of CERVICAL STENOSIS WITH MYELOPATHY  with pain and limitations of activities of daily living who presents at this time for a C3-4 C4-5 C5-6 ANTERIOR CERVICAL DISCECTOMY AND FUSION WITH DEPUY PEEK ALLOGRAFT AND SKYLINE PLATE  following the failure of conservative management. PMH:   Past Medical History:   Diagnosis Date    Arthritis     KNEES     Cataract     Diverticulosis     Hemorrhoids     Large carpet -like rectal polyps  seen on coloscopy  2015    Hyperlipidemia     PT STATES NOT     Hypertension     Onychomycosis     Prediabetes     Sciatica     Transient global amnesia     h/o        Body mass index is 29.69 kg/m². : A BMI > 30 is classified as obesity and > 40 is classified as morbid obesity. Medications upon admission :   Prior to Admission Medications   Prescriptions Last Dose Informant Patient Reported? Taking? MULTIVITAMIN PO 11/23/2020  Yes No   Sig: Take  by mouth. aspirin delayed-release 81 mg tablet 11/23/2020  Yes No   Sig: Take  by mouth daily. calcium carbonate-vitamin d2 500 mg(1,250mg) -200 unit tab 11/23/2020  Yes No   Sig: Take  by mouth.    docosahexanoic acid/epa (FISH OIL PO) 11/23/2020  Yes No   Sig: Take  by mouth.   enalapril-hydroCHLOROthiazide (VASERETIC) 5-12.5 mg tablet 11/23/2020  No No   Sig: TAKE 1 TABLET EVERY DAY   garlic 2,282 mg cap 52/03/9594  Yes No   Sig: Take 1,000 mg by mouth daily. rosuvastatin (CRESTOR) 5 mg tablet 11/29/2020 at Unknown time  No Yes   Sig: TAKE 1 TABLET EVERY DAY      Facility-Administered Medications: None        Allergies: Allergies   Allergen Reactions    Penicillins Rash and Swelling     None noted         Hospital Course: The patient underwent surgery. Complications:  None; patient tolerated the procedure well. Was taken to the PACU in stable condition and then transferred to the ortho floor. Perioperative Antibiotics:  Ancef     Postoperative Pain Management:  Tylenol     Postoperative transfusions:    Number of units banked PRBCs =   none     Post Op complications: none    Hemoglobin at discharge:    Lab Results   Component Value Date/Time    HGB 15.3 11/18/2020 03:00 PM       Dressing was changed on POD # 1. Incision - clean, dry and intact. No significant erythema or swelling. Wound appears to be healing without any evidence of infection. Pt had a HVAC drain that was removed on POD# 1. Neurovascular exam found to be within normal limits. Physical Therapy started following surgery and participated in bed mobility, transfers and ambulation. Gait:  Gait  Base of Support: Widened  Speed/Mariaa: Slow  Step Length: Right shortened, Left shortened  Gait Abnormalities: (mildly flexed posture)  Ambulation - Level of Assistance: Contact guard assistance, Assist x1  Distance (ft): 150 Feet (ft)  Rail Use: Left   Stairs - Level of Assistance: Modified independent  Number of Stairs Trained: 5                   Discharged to: Home. Condition on Discharge:   good    Discharge instructions:  - Take pain medications as prescribed  - Resume pre hospital diet      - Discharge activity: activity as tolerated  - Ambulate as tolerated  - Cervical Collar  - Avoid bending, lifting and twisting  - Wound Care Keep wound clean and dry.   See discharge instruction sheet. -DISCHARGE MEDICATION LIST     Discharge Medication List as of 12/1/2020 11:33 AM      START taking these medications    Details   acetaminophen (TYLENOL) 325 mg tablet Take 2 Tabs by mouth every six (6) hours as needed for Pain., No Print, Disp-1 Tab,R-0         CONTINUE these medications which have NOT CHANGED    Details   garlic 9,438 mg cap Take 1,000 mg by mouth daily. , Historical Med      rosuvastatin (CRESTOR) 5 mg tablet TAKE 1 TABLET EVERY DAY, Normal, Disp-90 Tab,R-1      enalapril-hydroCHLOROthiazide (VASERETIC) 5-12.5 mg tablet TAKE 1 TABLET EVERY DAY, Normal, Disp-90 Tab,R-1      docosahexanoic acid/epa (FISH OIL PO) Take  by mouth., Historical Med      MULTIVITAMIN PO Take  by mouth., Historical Med      calcium carbonate-vitamin d2 500 mg(1,250mg) -200 unit tab Take  by mouth., Historical Med         STOP taking these medications       aspirin delayed-release 81 mg tablet Comments:   Reason for Stopping:            per medical continuation form      -Follow up in office in 2 weeks      Signed:  PARMINDER Villalpando     12/1/2020  1:38 PM

## 2020-12-01 NOTE — PROGRESS NOTES
Problem: Falls - Risk of  Goal: *Absence of Falls  Description: Document Richard Merlin Fall Risk and appropriate interventions in the flowsheet.   Outcome: Resolved/Met     Problem: Patient Education: Go to Patient Education Activity  Goal: Patient/Family Education  Outcome: Resolved/Met     Problem: Discharge Planning  Goal: *Discharge to safe environment  Outcome: Resolved/Met

## 2020-12-01 NOTE — PROGRESS NOTES
[facility]-Home with   RUR-8% Low    Reason for Admission:   CERVICAL STENOSIS WITH MYELOPATHY                   RUR Score:     8%               Plan for utilizing home health:      No Needs    PCP: First and Last name:  Dr. Laurel Rhoades   Name of Practice:  [facility]   Are you a current patient: Yes/No:  Yes   Approximate date of last visit: 10/16/20   Can you participate in a virtual visit with your PCP: NA                    Current Advanced Directive/Advance Care Plan: Not on file                         Transition of Care Plan:      CM met with patient and  to inform of CM role and to assess needs. Patient verified demographics. Patient lives at home with her  and is independent with self-care and ADLs. Patient does not use a DME regularly but has a cane to use if needed.  present at bedside and will transport home. There are no CM needs identified. Medicare pt has received, reviewed, and signed 2nd IM letter informing them of their right to appeal the discharge. Signed copy has been placed on pt bedside chart.     Mikael Rodas MS

## 2020-12-01 NOTE — PROGRESS NOTES
Spine Surgery Progress Note  Ortiz Johns PA-C      Admit Date: 2020   LOS: 1 day      Daily Progress Note: 2020    POD:1 Day Post-Op    S/P: Procedure(s):  C3-4 C4-5 C5-6 ANTERIOR CERVICAL DISCECTOMY AND FUSION WITH DEPUY PEEK ALLOGRAFT AND SKYLINE PLATE    Subjective:     Ms. Ebony Wilks is a 68year old woman with a PMHx of HTN. She presented to the office with bilateral hand numbness that came on suddenly in 2020. Neck pain was minimal. She noticed difficulty buttoning buttons and with some fine motor coordination. Gait and balance were stable. An EMG revealed mild carpal tunnel and ulnar nerve compression and chronic C6 motor radiculopathy. An MRI revealed severe spinal stenosis with a large central disk herniation and cord compression at C3-4 and high grade stenosis at C4-5 with cord compression and C5-6 with cord compression, worse at C2-3. After failure of conservative treatment such as wrist braces and OTC meds, patient elected to undergo a 3 level ACDF with Dr. Gio Miller. Patient is doing well 1 day after surgery. Pain is controlled with tylenol and patient is having no issues with soft foods. She is voiding without issue. At present, patient denies chest pain, leg pain, nausea, vomiting, difficulty swallowing, headache, and dyspnea. Pt sitting up comfortably in a chair. Objective:     Vital signs  VSS Afebrile. Temp (24hrs), Av.8 °F (36.6 °C), Min:97.1 °F (36.2 °C), Max:99.4 °F (37.4 °C)   No intake/output data recorded.   1901 -  0700  In: 1000 [I.V.:1000]  Out: 500 [Urine:450]    Visit Vitals  BP (!) 164/92 (BP 1 Location: Left arm, BP Patient Position: Supine)   Pulse 88   Temp 99.4 °F (37.4 °C)   Resp 18   Wt 86 kg (189 lb 9.5 oz)   SpO2 98%   BMI 29.69 kg/m²    O2 Flow Rate (L/min): 2 l/min O2 Device: Room air       Voiding status: +  Output (mL)  Last Bowel Movement Date: 20 (20)  Unmeasurable Output  Urine Occurrence(s): 1 (20 0545)     Pain control  Pain Assessment  Pain Scale 1: Numeric (0 - 10)  Pain Intensity 1: 0(denies pain at this time)  Pain Onset 1: post op  Pain Location 1: Neck  Pain Orientation 1: Posterior  Pain Description 1: Aching  Pain Intervention(s) 1: Medication (see MAR), Emotional support, Repositioned    Physical Exam:  Gen: No acute distress. Neuro: A&Ox3. Follows commands. Speech clear. Affect normal.  WEINSTEIN spontaneously. Sensation and strength mildly diminished in LUE in line with patient's baseline  Gait deferred. Calves soft and supple; No pain with passive stretch  Skin: Incision C/D/I  Drain in place    24 hour results:    No results found for this or any previous visit (from the past 24 hour(s)).      Assessment:     Active Problems:    Cervical stenosis of spinal canal (11/30/2020)      Plan:     1) s/p C3-6 ACDF   -PT/OT    -Pain control - tylenol   -Drain - D/C   -Diet - tolerating soft food   -Voiding without issue  2) HTN   -Daily lisinopril & HCTZ    Readiness for discharge:     [x] Vital Signs stable (asymptomatic HTN - baseline BP)   [x] + Voiding    [x] Wound intact, drainage minimal    [x] Tolerating PO intake     [x] Cleared by PT (OT if applicable)    [x] Adequate pain control on oral medication alone     Activity: as tolerated  DVT ppx: JUSTYN stockings  Dispo: home today    Plan d/w Dr. Licia Barthel, PA

## 2020-12-01 NOTE — PROGRESS NOTES
PHYSICAL THERAPY EVALUATION/DISCHARGE  Patient: Vinie Sandifer (89 y.o. female)  Date: 12/1/2020  Primary Diagnosis: Cervical stenosis of spinal canal [M48.02]  Procedure(s) (LRB):  C3-4 C4-5 C5-6 ANTERIOR CERVICAL DISCECTOMY AND FUSION WITH DEPUY PEEK ALLOGRAFT AND SKYLINE PLATE (N/A) 1 Day Post-Op   Precautions:   Spinal, No bending, no lifting greater than 5 lbs, no twisting, log-roll technique, repositioning every 20-30 min except when sleeping, brace on at all times         ASSESSMENT  Based on the objective data described below, the patient presents with minimally impaired functional mobility S/P spinal surgery. Pt reports she continues to have numbness/swelling of bilateral hands however hands do not appear edematous. Pt is independent at baseline within her home and uses a [facility] when out in the community for longer distances. Pt was provided with education regarding spinal precautions, log roll technique, and importance of wearing collar at all times. Pt tolerated ambulation for a good distance with a steady gait with a slightly flexed posture. Pt was cleared on stairs. She is cleared for discharge from a PT standpoint. Functional Outcome Measure: The patient scored 27/28 on the Tinetti outcome measure which is indicative of low fall risk. Other factors to consider for discharge: none, has a supportive      Further skilled acute physical therapy is not indicated at this time. PLAN :  Recommendation for discharge: (in order for the patient to meet his/her long term goals)  No skilled physical therapy/ follow up rehabilitation needs identified at this time. This discharge recommendation:  Has been made in collaboration with the attending provider and/or case management    IF patient discharges home will need the following DME: patient owns DME required for discharge       SUBJECTIVE:   Patient stated I know I am supposed to get up every 30 min.     OBJECTIVE DATA SUMMARY: HISTORY:    Past Medical History:   Diagnosis Date    Arthritis     KNEES     Cataract     Diverticulosis     Hemorrhoids     Large carpet -like rectal polyps  seen on coloscopy  2015    Hyperlipidemia     PT STATES NOT     Hypertension     Onychomycosis     Prediabetes     Sciatica     Transient global amnesia     h/o      Past Surgical History:   Procedure Laterality Date    HX CATARACT REMOVAL Right     HX COLONOSCOPY  2014 12/2014: ext hemorrhoids, diverticulosis, carpet-like polyp (resected by Dr. Umair Burnette 2/12/15) Repeat due 8/2018     HX HIP REPLACEMENT Right     HX HIP REPLACEMENT Right     HX HYSTERECTOMY  1996    HX KNEE REPLACEMENT Right     HX KNEE REPLACEMENT Left 2019    HX VEIN STRIPPING      HX WISDOM TEETH EXTRACTION         Prior level of function: Modified Independent to independent  Personal factors and/or comorbidities impacting plan of care:     Home Situation  Home Environment: Private residence  # Steps to Enter: 4  Rails to Enter: Yes  Hand Rails : Left  One/Two Story Residence: Two story, live on 1st floor  Living Alone: No  Support Systems: Spouse/Significant Other/Partner  Patient Expects to be Discharged to[de-identified] Private residence  Current DME Used/Available at Home: Georganne Salm, straight, Walker, rolling, Raised toilet seat    EXAMINATION/PRESENTATION/DECISION MAKING:   Critical Behavior:  Neurologic State: Alert  Orientation Level: Oriented X4  Cognition: Appropriate decision making, Appropriate for age attention/concentration, Appropriate safety awareness  Safety/Judgement: Insight into deficits, Home safety, Good awareness of safety precautions, Fall prevention         Edema: pt reports swelling in hands but they don't appear swollen  Range Of Motion:  AROM: Generally decreased, functional                       Strength:    Strength: Generally decreased, functional                    Tone & Sensation:   Tone: Normal              Sensation: Impaired(numbness in bilateral hands)               Coordination:  Coordination: Within functional limits       Functional Mobility:  Bed Mobility:  Rolling: Independent  Supine to Sit: Independent     Scooting: Independent  Transfers:  Sit to Stand: Modified independent  Stand to Sit: Modified independent                       Balance:   Sitting: Intact  Standing: Intact  Ambulation/Gait Training:  Distance (ft): 150 Feet (ft)     Ambulation - Level of Assistance: Contact guard assistance;Assist x1        Gait Abnormalities: (mildly flexed posture)        Base of Support: Widened     Speed/Mariaa: Slow  Step Length: Right shortened;Left shortened            Stairs:  Number of Stairs Trained: 5  Stairs - Level of Assistance: Modified independent   Rail Use: Left         Functional Measure:  Tinetti test:    Sitting Balance: 1  Arises: 2  Attempts to Rise: 2  Immediate Standing Balance: 2  Standing Balance: 2  Nudged: 2  Eyes Closed: 1  Turn 360 Degrees - Continuous/Discontinuous: 1  Turn 360 Degrees - Steady/Unsteady: 1  Sitting Down: 2  Balance Score: 16 Balance total score  Indication of Gait: 1  R Step Length/Height: 1     R Foot Clearance: 1  L Foot Clearance: 1  Step Symmetry: 1  Step Continuity: 1  Path: 2  Trunk: 2  Walking Time: 0    Gait total score   27/28 Overall total score         Tinetti Tool Score Risk of Falls  <19 = High Fall Risk  19-24 = Moderate Fall Risk  25-28 = Low Fall Risk  Tinetti ME. Performance-Oriented Assessment of Mobility Problems in Elderly Patients. Pierce 68; [id number].  (Scoring Description: PT Bulletin Feb. 10, 1993)    Older adults: Liana Reyes et al, 2009; n = 1000 [facility] elderly evaluated with ABC, KYLE, ADL, and IADL)  · Mean KYLE score for males aged 69-68 years = 26.21(3.40)  · Mean KYLE score for females age 69-68 years = 25.16(4.30)  · Mean KYLE score for males over 80 years = 23.29(6.02)  · Mean KYLE score for females over 80 years = 17.20(8.32)            Physical Therapy Evaluation Charge Determination History Examination Presentation Decision-Making   MEDIUM  Complexity : 1-2 comorbidities / personal factors will impact the outcome/ POC  LOW Complexity : 1-2 Standardized tests and measures addressing body structure, function, activity limitation and / or participation in recreation  LOW Complexity : Stable, uncomplicated  LOW Complexity : FOTO score of       Based on the above components, the patient evaluation is determined to be of the following complexity level: LOW     Pain Rating:  Verbal: no complaints of pain with activity     Activity Tolerance:   Good      After treatment patient left in no apparent distress:   Sitting in chair and Call bell within reach    COMMUNICATION/EDUCATION:   The patients plan of care was discussed with: Registered nurse. Fall prevention education was provided and the patient/caregiver indicated understanding. and Patient/family agree to work toward stated goals and plan of care.     Thank you for this referral.  Amy Gean Meckel   Time Calculation: 15 mins

## 2020-12-02 ENCOUNTER — PATIENT OUTREACH (OUTPATIENT)
Dept: CASE MANAGEMENT | Age: 80
End: 2020-12-02

## 2020-12-02 NOTE — ACP (ADVANCE CARE PLANNING)
spouse, Perry Sees states that they have AMD in place- states that a copy was shared with PCP- CTN relayed not able to locate in EMR- asked to provide copy to PCP office and ask for it to be scanned into EMR. He states that he brought to hospital but was not asked to provide.

## 2020-12-02 NOTE — PROGRESS NOTES
Patient was admitted to [facility] on 20 and discharged on 20 for Elective Ant Cervical C3-6 diskectomy with fusion/hardware. Outreach made within 2 business days of discharge: Yes    Top Discharge Challenges to be reviewed by the provider   Additional needs identified to be addressed with provider yes  Does not have follow up appointment in place with PCP at this time. advance care planning- spouse states that they have in AMD in place- they provided PCP office with copy but CTN unable to locate under Media file and ACP not showing in place in EMR. Discussed COVID-19 related testing which was available at this time. Test results were negative. Patient informed of results, if available? NA   Method of communication with provider : chart routing       Advance Care Planning:   Does patient have an Advance Directive:  spouse, Jesica Carrion states that they have AMD in place- states that a copy was shared with PCP- CTN relayed not able to locate in EMR- asked to provide copy to PCP office and ask for it to be scanned into EMR. He states that he brought to hospital but was not asked to provide. Inpatient Readmission Risk score: NA  Was this a readmission? no   Patient stated reason for the admission: NA  Patients top risk factors for readmission: level of motivation and medical condition  Interventions to address risk factors: education and support    Care Transition Nurse (CTN) contacted the patient by telephone to perform post hospital discharge assessment. Verified name and  with spouse, Jesica Carrion  as identifiers. Provided introduction to self, and explanation of the CTN role. CTN reviewed discharge instructions, medical action plan and red flags with family who verbalized understanding. Family given an opportunity to ask questions and does not have any further questions or concerns at this time.  The family agrees to contact the PCP office for questions related to their healthcare. Medication reconciliation was performed with family, who verbalizes understanding of administration of home medications. Advised obtaining a 90-day supply of all daily and as-needed medications. Referral to Pharm D needed: no     Home Health/Outpatient orders at discharge: none    Durable Medical Equipment ordered at discharge: none    Covid Risk Education    Patient has following risk factors of: diabetes and HTN. Education provided regarding infection prevention, and signs and symptoms of COVID-19 and when to seek medical attention with family who verbalized understanding. Discussed exposure protocols and quarantine From CDC: Are you at higher risk for severe illness?  and given an opportunity for questions and concerns. The family agrees to contact the COVID-19 hotline 019-724-1424 or PCP office for questions related to COVID-19. For more information on steps you can take to protect yourself, see CDC's How to Protect Yourself     Discussed follow-up appointments. If no appointment was previously scheduled, appointment scheduling offered: not needed   [facility] Alireza Solis follow up appointment(s): No future appointments. Non-[facility] follow up appointment(s):   Neurosurgeon- Dr. Jordan Santana- 2 weeks   Plan for follow-up call in 10-14 days based on severity of symptoms and risk factors. CTN provided contact information for future needs. Goals Addressed                 This Visit's Progress       General     Reduce Risk of Hospitalization        12/2/20- spoke with Mr. Keny Taveras- he said wife was doing well. Last evening had some mild soreness in throat- this morning improved-ate scrambled eggs for breakfast.   She is wearing Aspen collar and understands instructions related to wearing and bathing, etc.    She has tylenol to use for pain- not experiencing pain. Shared that some \"muscle spasms like pain\" might happen due to \"fatigue\".   Explained how to use offsetting by using high recliner like chair- wearing collar lean back to use chair for supporting head- to rest.    Advised not to use ASA or ASA like products- NSAIDs during post recovery period. He verbalized understanding for not using. Encouraged her to be regularly active- with walking, drinking fluids and foods to hydrate and keep stools soft- to avoid constipation. Asked him to monitor BP daily call provider if seeing values consistently out of range: above 160/mid to high 80's.     LLC

## 2020-12-09 ENCOUNTER — OFFICE VISIT (OUTPATIENT)
Dept: FAMILY MEDICINE CLINIC | Age: 80
End: 2020-12-09
Payer: MEDICARE

## 2020-12-09 VITALS
WEIGHT: 185 LBS | OXYGEN SATURATION: 100 % | RESPIRATION RATE: 16 BRPM | BODY MASS INDEX: 29.03 KG/M2 | HEART RATE: 9 BPM | TEMPERATURE: 96.4 F | DIASTOLIC BLOOD PRESSURE: 87 MMHG | HEIGHT: 67 IN | SYSTOLIC BLOOD PRESSURE: 149 MMHG

## 2020-12-09 DIAGNOSIS — M48.02 CERVICAL STENOSIS OF SPINAL CANAL: Primary | ICD-10-CM

## 2020-12-09 DIAGNOSIS — I10 ESSENTIAL HYPERTENSION: ICD-10-CM

## 2020-12-09 PROCEDURE — G8427 DOCREV CUR MEDS BY ELIG CLIN: HCPCS | Performed by: FAMILY MEDICINE

## 2020-12-09 PROCEDURE — 99495 TRANSJ CARE MGMT MOD F2F 14D: CPT | Performed by: FAMILY MEDICINE

## 2020-12-09 NOTE — PROGRESS NOTES
Assessment and Plan    1. Cervical stenosis of spinal canal  Doing well post op    2. Essential hypertension  Near goal and skipped today's dose      Follow-up and Dispositions    · Return in about 3 months (around 3/9/2021) for Blood pressure follow up, Medication follow up. Diagnosis and plan discussed with patient who verbillized understanding. History of present Cheli Cortez is a 68 y.o. female presenting for Surgical Follow-up (C-spine surgery follow up)    Surgery 11/30/2020 DC 12/1  Dr. Laci Villar  C spine 3-6 hardware for spinal stenosis  No fever or chills  Sees Dr. Homer Coyne tomorrow. Eating OK   Bowels and urine are working. Numbness of hands unchanged. Hypertension  Did not take meds today  Home BP's all at goal    Meds reconciled  Hospital records reviewed      Review of Systems   Constitutional: Negative for chills and fever. HENT: Negative for congestion and sore throat. Respiratory: Negative for cough and shortness of breath. Cardiovascular: Negative for chest pain and palpitations. Musculoskeletal: Positive for neck pain. Neurological: Positive for sensory change. Negative for focal weakness and weakness. Psychiatric/Behavioral: Negative.           Past Medical History:   Diagnosis Date    Arthritis     KNEES     Cataract     Diverticulosis     Hemorrhoids     Large carpet -like rectal polyps  seen on coloscopy  2015    Hyperlipidemia     PT STATES NOT     Hypertension     Onychomycosis     Prediabetes     Sciatica     Transient global amnesia     h/o      Past Surgical History:   Procedure Laterality Date    HX CATARACT REMOVAL Right     HX COLONOSCOPY  2014 12/2014: ext hemorrhoids, diverticulosis, carpet-like polyp (resected by Dr. Massey Cassette 2/12/15) Repeat due 8/2018     HX HIP REPLACEMENT Right     HX HIP REPLACEMENT Right     HX HYSTERECTOMY  1996    HX KNEE REPLACEMENT Right     HX KNEE REPLACEMENT Left 2019    HX VEIN [address]  HX WISDOM TEETH EXTRACTION       Family History   Problem Relation Age of Onset    Hypertension Mother     Stroke Mother     Diabetes Mother     Hypertension Maternal Grandmother     Stroke Maternal Grandmother     Hypertension Sister     Hypertension Brother     Hypertension Brother     Hypertension Brother     Heart Disease Brother     Hypertension Brother     Diabetes Brother     Hypertension Brother     Diabetes Brother     Anesth Problems Neg Hx      Social History     Socioeconomic History    Marital status:      Spouse name: Not on file    Number of children: Not on file    Years of education: Not on file    Highest education level: Not on file   Occupational History    Not on file   Social Needs    Financial resource strain: Not on file    Food insecurity     Worry: Not on file     Inability: Not on file   [employer] needs     Medical: Not on file     Non-medical: Not on file   Tobacco Use    Smoking status: Never Smoker    Smokeless tobacco: Never Used   Substance and Sexual Activity    Alcohol use: No     Frequency: Never     Comment: RARELY     Drug use: No    Sexual activity: Yes   Lifestyle    Physical activity     Days per week: Not on file     Minutes per session: Not on file    Stress: Not on file   Relationships    Social connections     Talks on phone: Not on file     Gets together: Not on file     Attends Mandaeism service: Not on file     Active member of club or organization: Not on file     Attends meetings of clubs or organizations: Not on file     Relationship status: Not on file    Intimate partner violence     Fear of current or ex partner: Not on file     Emotionally abused: Not on file     Physically abused: Not on file     Forced sexual activity: Not on file   Other Topics Concern    Not on file   Social History Narrative    Not on file         Prior to Admission medications    Medication Sig Start Date End Date Taking?  Authorizing Provider   acetaminophen (TYLENOL) 325 mg tablet Take 2 Tabs by mouth every six (6) hours as needed for Pain. 12/1/20  Yes Porfirio Bridges PA   garlic 1,706 mg cap Take 1,000 mg by mouth daily. Yes Provider, Historical   rosuvastatin (CRESTOR) 5 mg tablet TAKE 1 TABLET EVERY DAY 7/30/20  Yes Kyle Nicolas MD   enalapril-hydroCHLOROthiazide (VASERETIC) 5-12.5 mg tablet TAKE 1 TABLET EVERY DAY 7/30/20  Yes Kyle Nicolas MD   docosahexanoic acid/epa (FISH OIL PO) Take  by mouth. Yes Provider, Historical   MULTIVITAMIN PO Take  by mouth. Yes Provider, Historical   calcium carbonate-vitamin d2 500 mg(1,250mg) -200 unit tab Take  by mouth. Yes Provider, Historical        Allergies   Allergen Reactions    Penicillins Rash and Swelling     None noted        Vitals:    12/09/20 1353 12/09/20 1356   BP: (!) 156/87 (!) 149/87   Pulse: (!) 9    Resp: 16    Temp: (!) 96.4 °F (35.8 °C)    TempSrc: Oral    SpO2: 100%    Weight: 185 lb (83.9 kg)    Height: 5' 7\" (1.702 m)      Body mass index is 28.98 kg/m². Objective  Physical Exam  Vitals signs and nursing note reviewed. Constitutional:       Appearance: Normal appearance. She is not toxic-appearing. HENT:      Head: Normocephalic and atraumatic. Neck:      Comments: In C spine collar. Cardiovascular:      Rate and Rhythm: Normal rate and regular rhythm. Heart sounds: Normal heart sounds. No murmur. No gallop. Pulmonary:      Effort: Pulmonary effort is normal. No respiratory distress. Breath sounds: Normal breath sounds. No wheezing, rhonchi or rales. Neurological:      Mental Status: She is alert. Psychiatric:         Mood and Affect: Mood normal.         Behavior: Behavior normal.         Thought Content:  Thought content normal.         Judgment: Judgment normal.

## 2020-12-09 NOTE — PROGRESS NOTES
Patient stated name &     Chief Complaint   Patient presents with    Surgical Follow-up     C-spine surgery follow up        Health Maintenance Due   Topic    Bone Densitometry (Dexa) Screening     Shingrix Vaccine Age 49> (2 of 2)    Medicare Yearly Exam     Foot Exam Q1        Wt Readings from Last 3 Encounters:   20 185 lb (83.9 kg)   20 189 lb 9.5 oz (86 kg)   20 189 lb 9.5 oz (86 kg)     Temp Readings from Last 3 Encounters:   20 (!) 96.4 °F (35.8 °C) (Oral)   20 99.4 °F (37.4 °C)   20 98.2 °F (36.8 °C)     BP Readings from Last 3 Encounters:   20 (!) 149/87   20 (!) 164/92   20 (!) 146/78     Pulse Readings from Last 3 Encounters:   20 (!) 9   20 88   20 80         Learning Assessment:  :     Learning Assessment 10/22/2018   PRIMARY LEARNER Patient   HIGHEST LEVEL OF EDUCATION - PRIMARY LEARNER  SOME COLLEGE   PRIMARY LANGUAGE ENGLISH   LEARNER PREFERENCE PRIMARY READING   ANSWERED BY self   RELATIONSHIP SELF       Depression Screening:  :     3 most recent PHQ Screens 2020   Little interest or pleasure in doing things Not at all   Feeling down, depressed, irritable, or hopeless Not at all   Total Score PHQ 2 0       Fall Risk Assessment:  :     Fall Risk Assessment, last 12 mths 2020   Able to walk? Yes   Fall in past 12 months? No       Abuse Screening:  :     No flowsheet data found. Coordination of Care Questionnaire:  :     1) Have you been to an emergency room, urgent care clinic since your last visit? No    Hospitalized since your last visit? No             2) Have you seen or consulted any other health care providers outside of [address] [place] [address] since your last visit? No  (Include any pap smears or colon screenings in this section.)    Patient is accompanied by  I have received verbal consent from Mark Resendiz to discuss any/all medical information while they are present in the room.

## 2020-12-17 DIAGNOSIS — E78.00 HYPERCHOLESTEREMIA: ICD-10-CM

## 2020-12-17 DIAGNOSIS — I10 ESSENTIAL HYPERTENSION: ICD-10-CM

## 2020-12-17 RX ORDER — ENALAPRIL MALEATE AND HYDROCHLOROTHIAZIDE 5; 12.5 MG/1; MG/1
TABLET ORAL
Qty: 90 TAB | Refills: 1 | Status: SHIPPED | OUTPATIENT
Start: 2020-12-17 | End: 2021-05-05

## 2020-12-17 RX ORDER — ROSUVASTATIN CALCIUM 5 MG/1
TABLET, COATED ORAL
Qty: 90 TAB | Refills: 1 | Status: SHIPPED | OUTPATIENT
Start: 2020-12-17 | End: 2021-05-05

## 2021-08-10 ENCOUNTER — OFFICE VISIT (OUTPATIENT)
Dept: FAMILY MEDICINE CLINIC | Age: 81
End: 2021-08-10
Payer: MEDICARE

## 2021-08-10 VITALS
OXYGEN SATURATION: 99 % | SYSTOLIC BLOOD PRESSURE: 136 MMHG | RESPIRATION RATE: 18 BRPM | BODY MASS INDEX: 28.87 KG/M2 | HEART RATE: 88 BPM | HEIGHT: 71 IN | DIASTOLIC BLOOD PRESSURE: 79 MMHG | WEIGHT: 206.2 LBS | TEMPERATURE: 97.6 F

## 2021-08-10 DIAGNOSIS — E78.00 HYPERCHOLESTEREMIA: ICD-10-CM

## 2021-08-10 DIAGNOSIS — I10 ESSENTIAL HYPERTENSION: ICD-10-CM

## 2021-08-10 DIAGNOSIS — Z11.59 ENCOUNTER FOR HEPATITIS C SCREENING TEST FOR LOW RISK PATIENT: ICD-10-CM

## 2021-08-10 DIAGNOSIS — E11.9 TYPE 2 DIABETES MELLITUS WITHOUT COMPLICATION, WITHOUT LONG-TERM CURRENT USE OF INSULIN (HCC): Primary | ICD-10-CM

## 2021-08-10 LAB
ALBUMIN SERPL-MCNC: 3.8 G/DL (ref 3.5–5)
ALBUMIN/GLOB SERPL: 1 {RATIO} (ref 1.1–2.2)
ALP SERPL-CCNC: 68 U/L (ref 45–117)
ALT SERPL-CCNC: 34 U/L (ref 12–78)
ANION GAP SERPL CALC-SCNC: 5 MMOL/L (ref 5–15)
AST SERPL-CCNC: 25 U/L (ref 15–37)
BASOPHILS # BLD: 0.1 K/UL (ref 0–0.1)
BASOPHILS NFR BLD: 1 % (ref 0–1)
BILIRUB DIRECT SERPL-MCNC: 0.2 MG/DL (ref 0–0.2)
BILIRUB SERPL-MCNC: 0.5 MG/DL (ref 0.2–1)
BUN SERPL-MCNC: 19 MG/DL (ref 6–20)
BUN/CREAT SERPL: 31 (ref 12–20)
CALCIUM SERPL-MCNC: 9.4 MG/DL (ref 8.5–10.1)
CHLORIDE SERPL-SCNC: 105 MMOL/L (ref 97–108)
CHOLEST SERPL-MCNC: 198 MG/DL
CO2 SERPL-SCNC: 30 MMOL/L (ref 21–32)
COMMENT, HOLDF: NORMAL
CREAT SERPL-MCNC: 0.61 MG/DL (ref 0.55–1.02)
CREAT UR-MCNC: 98 MG/DL
DIFFERENTIAL METHOD BLD: ABNORMAL
EOSINOPHIL # BLD: 0.2 K/UL (ref 0–0.4)
EOSINOPHIL NFR BLD: 4 % (ref 0–7)
ERYTHROCYTE [DISTWIDTH] IN BLOOD BY AUTOMATED COUNT: 15.9 % (ref 11.5–14.5)
EST. AVERAGE GLUCOSE BLD GHB EST-MCNC: 137 MG/DL
GLOBULIN SER CALC-MCNC: 3.8 G/DL (ref 2–4)
GLUCOSE SERPL-MCNC: 100 MG/DL (ref 65–100)
HBA1C MFR BLD: 6.4 % (ref 4–5.6)
HCT VFR BLD AUTO: 47.1 % (ref 35–47)
HCV AB SERPL QL IA: NONREACTIVE
HDLC SERPL-MCNC: 87 MG/DL
HDLC SERPL: 2.3 {RATIO} (ref 0–5)
HGB BLD-MCNC: 14.3 G/DL (ref 11.5–16)
IMM GRANULOCYTES # BLD AUTO: 0 K/UL (ref 0–0.04)
IMM GRANULOCYTES NFR BLD AUTO: 0 % (ref 0–0.5)
LDLC SERPL CALC-MCNC: 96.6 MG/DL (ref 0–100)
LYMPHOCYTES # BLD: 2.1 K/UL (ref 0.8–3.5)
LYMPHOCYTES NFR BLD: 39 % (ref 12–49)
MCH RBC QN AUTO: 25.7 PG (ref 26–34)
MCHC RBC AUTO-ENTMCNC: 30.4 G/DL (ref 30–36.5)
MCV RBC AUTO: 84.7 FL (ref 80–99)
MICROALBUMIN UR-MCNC: 0.7 MG/DL
MICROALBUMIN/CREAT UR-RTO: 7 MG/G (ref 0–30)
MONOCYTES # BLD: 0.4 K/UL (ref 0–1)
MONOCYTES NFR BLD: 8 % (ref 5–13)
NEUTS SEG # BLD: 2.6 K/UL (ref 1.8–8)
NEUTS SEG NFR BLD: 48 % (ref 32–75)
NRBC # BLD: 0 K/UL (ref 0–0.01)
NRBC BLD-RTO: 0 PER 100 WBC
PLATELET # BLD AUTO: 258 K/UL (ref 150–400)
PMV BLD AUTO: 10.9 FL (ref 8.9–12.9)
POTASSIUM SERPL-SCNC: 4.6 MMOL/L (ref 3.5–5.1)
PROT SERPL-MCNC: 7.6 G/DL (ref 6.4–8.2)
RBC # BLD AUTO: 5.56 M/UL (ref 3.8–5.2)
SAMPLES BEING HELD,HOLD: NORMAL
SODIUM SERPL-SCNC: 140 MMOL/L (ref 136–145)
TRIGL SERPL-MCNC: 72 MG/DL (ref ?–150)
VLDLC SERPL CALC-MCNC: 14.4 MG/DL
WBC # BLD AUTO: 5.4 K/UL (ref 3.6–11)

## 2021-08-10 PROCEDURE — G8754 DIAS BP LESS 90: HCPCS | Performed by: FAMILY MEDICINE

## 2021-08-10 PROCEDURE — G8510 SCR DEP NEG, NO PLAN REQD: HCPCS | Performed by: FAMILY MEDICINE

## 2021-08-10 PROCEDURE — 1090F PRES/ABSN URINE INCON ASSESS: CPT | Performed by: FAMILY MEDICINE

## 2021-08-10 PROCEDURE — 99214 OFFICE O/P EST MOD 30 MIN: CPT | Performed by: FAMILY MEDICINE

## 2021-08-10 PROCEDURE — G8752 SYS BP LESS 140: HCPCS | Performed by: FAMILY MEDICINE

## 2021-08-10 PROCEDURE — G8536 NO DOC ELDER MAL SCRN: HCPCS | Performed by: FAMILY MEDICINE

## 2021-08-10 PROCEDURE — G8400 PT W/DXA NO RESULTS DOC: HCPCS | Performed by: FAMILY MEDICINE

## 2021-08-10 PROCEDURE — G8427 DOCREV CUR MEDS BY ELIG CLIN: HCPCS | Performed by: FAMILY MEDICINE

## 2021-08-10 PROCEDURE — 1101F PT FALLS ASSESS-DOCD LE1/YR: CPT | Performed by: FAMILY MEDICINE

## 2021-08-10 PROCEDURE — G8419 CALC BMI OUT NRM PARAM NOF/U: HCPCS | Performed by: FAMILY MEDICINE

## 2021-08-10 NOTE — PROGRESS NOTES
Identified pt with two pt identifiers(name and ). Reviewed record in preparation for visit and have obtained necessary documentation. Chief Complaint   Patient presents with    Diabetes    Foot Swelling     Right foot        Health Maintenance Due   Topic    Hepatitis C Screening     COVID-19 Vaccine (1)    Bone Densitometry (Dexa) Screening     Shingrix Vaccine Age 50> (2 of 2)    Medicare Yearly Exam     Foot Exam Q1     MICROALBUMIN Q1     Lipid Screen        Coordination of Care Questionnaire:  :   1) Have you been to an emergency room, urgent care, or hospitalized since your last visit? No  If yes, where when, and reason for visit? No     2. Have seen or consulted any other health care provider since your last visit? If yes, where when, and reason for visit?    No

## 2021-08-10 NOTE — PROGRESS NOTES
Lynn Holly  80 y.o. female  1940  [id number]  [facility] MEDICINE  Progress Note     Encounter Date: 8/10/2021    Assessment and Plan:     Encounter Diagnoses     ICD-10-CM ICD-9-CM   1. Type 2 diabetes mellitus without complication, without long-term current use of insulin (HCC)  E11.9 250.00   2. Essential hypertension  I10 401.9   3. Hypercholesteremia  E78.00 272.0   4. Encounter for hepatitis C screening test for low risk patient  Z11.59 V73.89       1. Essential hypertension  At goal, continue meds  - METABOLIC PANEL, BASIC; Future  - MICROALBUMIN, UR, RAND W/ MICROALB/CREAT RATIO; Future    2. Hypercholesteremia  Continue statin  - HEPATIC FUNCTION PANEL; Future  - LIPID PANEL; Future    3. Type 2 diabetes mellitus without complication, without long-term current use of insulin (HCC)  Marked weight gain. May need meds  Diet, exercise and overnight fasting reviewed  - CBC WITH AUTOMATED DIFF; Future  - HEMOGLOBIN A1C WITH EAG; Future  -  DIABETES FOOT EXAM; Future    4. Encounter for hepatitis C screening test for low risk patient  Screening.  - HEPATITIS C AB; Future      I have discussed the diagnosis with the patient and the intended plan as seen in the above orders. she has expressed understanding. The patient has received an after-visit summary and questions were answered concerning future plans. I have discussed medication side effects and warnings with the patient as well. Electronically Signed: Laly Adams MD    Current Medications after this visit     Current Outpatient Medications   Medication Sig    enalapril-hydroCHLOROthiazide (VASERETIC) 5-12.5 mg tablet TAKE 1 TABLET EVERY DAY    rosuvastatin (CRESTOR) 5 mg tablet TAKE 1 TABLET EVERY DAY    acetaminophen (TYLENOL) 325 mg tablet Take 2 Tabs by mouth every six (6) hours as needed for Pain.  garlic 3,652 mg cap Take 1,000 mg by mouth daily.     docosahexanoic acid/epa (FISH OIL PO) Take  by mouth.    MULTIVITAMIN PO Take  by mouth.  calcium carbonate-vitamin d2 500 mg(1,250mg) -200 unit tab Take  by mouth. No current facility-administered medications for this visit. There are no discontinued medications. ~~~~~~~~~~~~~~~~~~~~~~~~~~~~~~~~~~~~~~~~~~~~~~~~~~~~~~~~~~~    Chief Complaint   Patient presents with    Diabetes    Foot Swelling     Right foot       History provided by patient  History of Present Illness   Marley Mckeon is a 80 y.o. female who presents to clinic today for:  Diabetes and Foot Swelling (Right foot)    Hypertension  At goal  Takes meds consistently    Hypercholesterolemia  On statin    DM2  Diet only  No sweet drinks. Only one in past several months  Some desserts candies and cookies. Not a lot of starch. Gained 21 pounds over past 9 months    Health Maintenance  Completed HM gaps at today's visit, Asked patient to schedule a Wellness appt to discuss issues. Health Maintenance Due   Topic Date Due    Hepatitis C Screening  Never done    COVID-19 Vaccine (1) Never done    Bone Densitometry (Dexa) Screening  Never done    Shingrix Vaccine Age 50> (2 of 2) 02/28/2019    Medicare Yearly Exam  Never done    Foot Exam Q1  10/16/2020    MICROALBUMIN Q1  07/01/2021    Lipid Screen  07/01/2021     Review of Systems   ROS     Vitals/Objective:     Vitals:    08/10/21 0936   BP: 136/79   Pulse: 88   Resp: 18   Temp: 97.6 °F (36.4 °C)   TempSrc: Temporal   SpO2: 99%   Weight: 206 lb 3.2 oz (93.5 kg)   Height: 5' 11\" (1.803 m)     Body mass index is 28.76 kg/m². Wt Readings from Last 3 Encounters:   08/10/21 206 lb 3.2 oz (93.5 kg)   12/09/20 185 lb (83.9 kg)   11/30/20 189 lb 9.5 oz (86 kg)         Objective  Physical Exam  Vitals and nursing note reviewed. Constitutional:       Appearance: Normal appearance. She is not toxic-appearing. HENT:      Head: Normocephalic and atraumatic. Cardiovascular:      Rate and Rhythm: Normal rate and regular rhythm. Heart sounds: Normal heart sounds. No murmur heard. No gallop. Comments: Trace edema  Pulmonary:      Effort: Pulmonary effort is normal. No respiratory distress. Breath sounds: Normal breath sounds. No wheezing, rhonchi or rales. Musculoskeletal:      Cervical back: No muscular tenderness. Lymphadenopathy:      Cervical: No cervical adenopathy. Neurological:      Mental Status: She is alert. Psychiatric:         Mood and Affect: Mood normal.         Behavior: Behavior normal.         Thought Content: Thought content normal.         Judgment: Judgment normal.          Diabetic Foot Exam:  Protective sensation is intact bilaterally. Pedal pulses are 2+ and normal bilaterally. L foot skin inspection:  normal skin and soft tissue with no gross edema or evidence of acute injury or foot ulcer  R foot skin inspection:  skin and soft tissue appear normal with no significant edema or evidence of acute injury or foot ulcer     No results found for this or any previous visit (from the past 24 hour(s)). Disposition     Follow-up and Dispositions  ·   Return in about 6 months (around 2/10/2022) for Blood pressure follow up, Diabetes follow up, Medication follow up. Future Appointments   Date Time Provider Tracy López   8/10/2021 10:40 AM Lenora Victor MD [facility]       History   Patient's past medical, surgical and family histories were reviewed and updated.     Past Medical History:   Diagnosis Date    Arthritis     KNEES     Cataract     Diverticulosis     Hemorrhoids     Large carpet -like rectal polyps  seen on coloscopy  2015    Hyperlipidemia     PT STATES NOT     Hypertension     Onychomycosis     Prediabetes     Sciatica     Transient global amnesia     h/o      Past Surgical History:   Procedure Laterality Date    HX CATARACT REMOVAL Right     HX COLONOSCOPY  2014 12/2014: ext hemorrhoids, diverticulosis, carpet-like polyp (resected by Dr. Mick Chow 2/12/15) Repeat due 8/2018     HX HIP REPLACEMENT Right     HX HIP REPLACEMENT Right     HX HYSTERECTOMY  1996    HX KNEE REPLACEMENT Right     HX KNEE REPLACEMENT Left 2019    HX VEIN STRIPPING      HX WISDOM TEETH EXTRACTION       Family History   Problem Relation Age of Onset    Hypertension Mother     Stroke Mother     Diabetes Mother     Hypertension Maternal Grandmother     Stroke Maternal Grandmother     Hypertension Sister     Hypertension Brother     Hypertension Brother     Hypertension Brother     Heart Disease Brother     Hypertension Brother     Diabetes Brother     Hypertension Brother     Diabetes Brother     Anesth Problems Neg Hx      Social History     Tobacco Use    Smoking status: Never Smoker    Smokeless tobacco: Never Used   Substance Use Topics    Alcohol use: No     Comment: RARELY     Drug use: No       Allergies     Allergies   Allergen Reactions    Penicillins Rash and Swelling     None noted

## 2021-08-10 NOTE — PATIENT INSTRUCTIONS
The essentials of losing weight and a diabetic lower carbohydrate diet. 1. Exercise  a. You should exercise 45- 60 minutes every day. b. This reduces the stored energy in your muscles and allows your insulin level to fall.  c. You can only lose weight when your insulin level is low. Then you burn stored energy. 2.  Fasting   a. You should fast every night from 12-14 hours. b.  Ting Harris are still using energy at night when you are sleeping and will burn stored energy. c.  Fasting allows you burn stored energy in your internal organs such as the liver. This allows the insulin level to drop.   d.  This allows you to start losing weight. 3.  No sugar!   a. You should try to eliminate sugar from your diet. b.  First, eliminate sweet drinks including:  sodas and carrillo, sports drinks (like Gatorade or Poweraid), sweet tea and lemonade, wine (and beer), fruit juice (contains fruit sugar) and milk (contains milk sugar). c.  Eliminate sugary cereals, cookies and candies. No donuts, pastries or coffee cake. d.  Eat desserts only on special occasions:  family reunions, birthdays, anniversaries, major holidays. Eat only small desserts!   e. Start watching labels for foods that have added sugars. 4. Limit starches   a. Limit starches particularly:  bread, noodles, pasta, crackers and chips, rice, potatoes and fries. b.  Watch out for starchy vegetables:  corn and peas. c.  Women can have 30-45 grams of carbs per meal   d. Men can have 45-60 grams of carbs per meal   e. One piece of bread has 15-20 grams of carbs   f. One half cup of oatmeal, corn, rice, peas and cooked pasta have about 15 grams of carbs. 5.  Fruit-special case   a. Fruit has nutrients we need such as Vitamin C but also contains a lot of fruit sugar (fructose)   b. Fruit is not a good snack because fructose does not suppress your appetite.    c.  Fruit can cause progression of fatty liver disease which makes weight loss harder   d. Limit yourself to one serving of fruit per day and try to eat berries, small apples, oranges, ana luisa or grapefruit.           e.  Avoid high sugar fruits including mangos, bananas, grapes and cherries. f.  One serving of fruit is 1/2 to 3/4 of a cup.    6.  What do I eat instead?   a. Eat protein. It curbs your appetite longer than carbs do anyway. Eat meat, chicken, fish and eggs. b.  Eat healthy fats:  fish, olive oil, nuts   c. Eat more vegetables and salads. d.  Eat all of the above before you eat any carbs.   e.  Eat slowly and enjoy your food. f.  Drink more water. 7.  Snacks   a. Good snacks:  Cheese, nuts, Kind bars (minis), Protein bars, carrot sticks, celery sticks. b.  Read labels and look for snacks with low amounts of carbohydrate per serving (10 or less)   c. Try not to eat between meals. You'll lose more weight if you are not constantly putting energy into the system. 8.  Accountability   a. You have to keep yourself honest about your diet efforts. You need reminders to stick to your change in lifestyle and diet. b.  Weigh yourself daily   c.  Record your food intake either on an rob or in writing.   d.  Record your exercise. 9.  Support and emotional health. a.  Surround yourself with people that will help you and support your efforts. b. Avoid people that may sabotage your efforts or insist that they at least not undermine you.  c.  Be patient. An average patient loses only 3 pounds a month but that's 36 pounds at the end of a year. d.  Think long term. Try to keep up good exercise and diet habits. Once you get the weight off, keep it off.  e.  Pay attention to your emotions about food and your weight. Have a healthy attitude towards yourself and your body. 10.  When do I get to go back to eating the way I did before? Answer: Never. If you resume your old patterns of eating that caused your weight gain, you'll just gain the weight back. Try to make permanent changes in how you live every day. It's not easy but you can do it.

## 2021-08-12 NOTE — PROGRESS NOTES
Your blood work looks fine. The diabetic control and cholesterol are OK. It is important to try to control your weight so work on your diet and stick to your medications. See me in 6 months and as needed.   [facility]

## 2021-10-01 ENCOUNTER — OFFICE VISIT (OUTPATIENT)
Dept: FAMILY MEDICINE CLINIC | Age: 81
End: 2021-10-01
Payer: MEDICARE

## 2021-10-01 VITALS
HEIGHT: 71 IN | WEIGHT: 207 LBS | TEMPERATURE: 97.6 F | OXYGEN SATURATION: 97 % | BODY MASS INDEX: 28.98 KG/M2 | DIASTOLIC BLOOD PRESSURE: 77 MMHG | SYSTOLIC BLOOD PRESSURE: 143 MMHG | RESPIRATION RATE: 16 BRPM | HEART RATE: 77 BPM

## 2021-10-01 DIAGNOSIS — B35.1 ONYCHOMYCOSIS: Primary | ICD-10-CM

## 2021-10-01 DIAGNOSIS — T14.8XXA BLISTER: ICD-10-CM

## 2021-10-01 PROCEDURE — G8400 PT W/DXA NO RESULTS DOC: HCPCS | Performed by: NURSE PRACTITIONER

## 2021-10-01 PROCEDURE — G8419 CALC BMI OUT NRM PARAM NOF/U: HCPCS | Performed by: NURSE PRACTITIONER

## 2021-10-01 PROCEDURE — G8427 DOCREV CUR MEDS BY ELIG CLIN: HCPCS | Performed by: NURSE PRACTITIONER

## 2021-10-01 PROCEDURE — 1101F PT FALLS ASSESS-DOCD LE1/YR: CPT | Performed by: NURSE PRACTITIONER

## 2021-10-01 PROCEDURE — G8753 SYS BP > OR = 140: HCPCS | Performed by: NURSE PRACTITIONER

## 2021-10-01 PROCEDURE — 99213 OFFICE O/P EST LOW 20 MIN: CPT | Performed by: NURSE PRACTITIONER

## 2021-10-01 PROCEDURE — 1090F PRES/ABSN URINE INCON ASSESS: CPT | Performed by: NURSE PRACTITIONER

## 2021-10-01 PROCEDURE — G8432 DEP SCR NOT DOC, RNG: HCPCS | Performed by: NURSE PRACTITIONER

## 2021-10-01 PROCEDURE — G8754 DIAS BP LESS 90: HCPCS | Performed by: NURSE PRACTITIONER

## 2021-10-01 PROCEDURE — G8536 NO DOC ELDER MAL SCRN: HCPCS | Performed by: NURSE PRACTITIONER

## 2021-10-01 NOTE — PROGRESS NOTES
1. Have you been to the ER, urgent care clinic since your last visit? Hospitalized since your last visit? No    2. Have you seen or consulted any other health care providers outside of the [address] [place] [address] since your last visit? Include any pap smears or colon screening. No  Health Maintenance Due   Topic Date Due    Bone Densitometry (Dexa) Screening  Never done    Shingrix Vaccine Age 49> (2 of 2) 02/28/2019    Medicare Yearly Exam  Never done    Foot Exam Q1  10/16/2020    Flu Vaccine (1) 09/01/2021    Eye Exam Retinal or Dilated  09/23/2021       Chief Complaint   Patient presents with    Nail Problem     B/l foot on toes. x1 week. Health Maintenance Due   Topic Date Due    Bone Densitometry (Dexa) Screening  Never done    Shingrix Vaccine Age 49> (2 of 2) 02/28/2019    Medicare Yearly Exam  Never done    Foot Exam Q1  10/16/2020    Flu Vaccine (1) 09/01/2021    Eye Exam Retinal or Dilated  09/23/2021     Visit Vitals  BP (!) 143/77 (BP 1 Location: Left arm, BP Patient Position: Sitting, BP Cuff Size: Adult)   Pulse 77   Temp 97.6 °F (36.4 °C) (Skin)   Resp 16   Ht 5' 11\" (1.803 m)   Wt 207 lb (93.9 kg)   SpO2 97%   BMI 28.87 kg/m²     3 most recent PHQ Screens 10/1/2021   Little interest or pleasure in doing things Not at all   Feeling down, depressed, irritable, or hopeless Not at all   Total Score PHQ 2 0     Abuse Screening Questionnaire 10/1/2021   Do you ever feel afraid of your partner? N   Are you in a relationship with someone who physically or mentally threatens you? N   Is it safe for you to go home?  Y     Learning Assessment 10/22/2018   PRIMARY LEARNER Patient   HIGHEST LEVEL OF EDUCATION - PRIMARY LEARNER  SOME COLLEGE   PRIMARY LANGUAGE ENGLISH   LEARNER PREFERENCE PRIMARY READING   ANSWERED BY self   RELATIONSHIP SELF

## 2021-10-01 NOTE — PATIENT INSTRUCTIONS
Toenail Fungus: Care Instructions  Overview  A nail that is infected by a fungus usually turns white or yellow. As the fungus spreads, the nail turns a darker color and gets thicker. And the nail edges start to turn ragged and crumble. A bad infection can cause pain, and the nail may pull away from the toe or finger. Nails that are exposed to moisture and warmth a lot are more likely to get infected by a fungus. This can happen from wearing sweaty shoes often and from walking barefoot on shower floors. Or it can happen if you share personal things, such as towels and nail clippers. It's hard to treat nail fungus. And the infection can return after it has cleared up. But medicines can sometimes get rid of nail fungus for good. If the infection is very bad, or if it causes a lot of pain, you may need to have the nail removed. Follow-up care is a key part of your treatment and safety. Be sure to make and go to all appointments, and call your doctor if you are having problems. It's also a good idea to know your test results and keep a list of the medicines you take. How can you care for yourself at home? · Take your medicines exactly as prescribed. Call your doctor if you have any problems with your medicine. · If your doctor gave you a cream or liquid to put on your nail, use it exactly as directed. · Wash your hands and feet often, and wash your hands after touching your feet. · Keep your nails clean and dry. Dry your feet completely after you bathe and before you put on shoes and socks. · Keep your nails trimmed. · Change socks often. Wear dry socks that absorb moisture. · Don't go barefoot in public places. · Use a spray or powder that fights fungus on your feet and in your shoes. · Don't pick at the skin around your nails. · Don't use nail polish or fake nails on your nails. · Don't share personal things, such as towels and nail clippers. When should you call for help?    Call your doctor now or seek immediate medical care if:    · You have signs of infection, such as:  ? Increased pain, swelling, warmth, or redness. ? Red streaks leading from the site. ? Pus draining from the site. ? A fever.     · You have new or increased toe pain. Watch closely for changes in your health, and be sure to contact your doctor if:    · You do not get better as expected. Where can you learn more? Go to http://www.gray.com/  Enter D202 in the search box to learn more about \"Toenail Fungus: Care Instructions. \"  Current as of: March 3, 2021               Content Version: 13.0  © 8723-8297 [employer]. Care instructions adapted under license by [employer] (which disclaims liability or warranty for this information). If you have questions about a medical condition or this instruction, always ask your healthcare professional. [address] any warranty or liability for your use of this information.

## 2021-10-01 NOTE — PROGRESS NOTES
Assessment/Plan:     Diagnoses and all orders for this visit:    1. Onychomycosis  -     REFERRAL TO PODIATRY  -     efinaconazole (JUBLIA) kali topical solution; Apply to affected area daily  - Worsening, may apply Jublia to the area as directed, referral to podiatry for further evaluation on the fungus and the blister. Declined oral medication today  -   2. Blister   -Worsening, advised to soak in very warm water with Epson salt for the next couple of days. Referral to podiatry for further evaluation            Discussed expected course/resolution/complications of diagnosis in detail with patient. Medication risks/benefits/costs/interactions/alternatives discussed with patient. Pt was given after visit summary which includes diagnoses, current medications & vitals. Pt expressed understanding with the diagnosis and plan        Subjective:      Namita Delgado is a 80 y.o. female who presents for had concerns including Nail Problem (B/l foot on toes. x1 week. ). Complains of toenail fungus  States Wednesday night noticed a blister on bottom right blister, blister is dark and painful  Fungus seems to be in bilateral great toes states she has a history of fingernail fungus and toenail fungus and has lost both great toenails and ingrown back. Current Outpatient Medications   Medication Sig Dispense Refill    efinaconazole (JUBLIA) kali topical solution Apply to affected area daily 8 mL 1    enalapril-hydroCHLOROthiazide (VASERETIC) 5-12.5 mg tablet TAKE 1 TABLET EVERY DAY 90 Tab 1    rosuvastatin (CRESTOR) 5 mg tablet TAKE 1 TABLET EVERY DAY 90 Tab 1    acetaminophen (TYLENOL) 325 mg tablet Take 2 Tabs by mouth every six (6) hours as needed for Pain. 1 Tab 0    garlic 9,895 mg cap Take 1,000 mg by mouth daily.  docosahexanoic acid/epa (FISH OIL PO) Take  by mouth.  MULTIVITAMIN PO Take  by mouth.  calcium carbonate-vitamin d2 500 mg(1,250mg) -200 unit tab Take  by mouth. Allergies   Allergen Reactions    Penicillins Rash and Swelling     None noted      Past Medical History:   Diagnosis Date    Arthritis     KNEES     Cataract     Diverticulosis     Hemorrhoids     Large carpet -like rectal polyps  seen on coloscopy  2015    Hyperlipidemia     PT STATES NOT     Hypertension     Onychomycosis     Prediabetes     Sciatica     Transient global amnesia     h/o      Past Surgical History:   Procedure Laterality Date    HX CATARACT REMOVAL Right     HX COLONOSCOPY  2014 12/2014: ext hemorrhoids, diverticulosis, carpet-like polyp (resected by Dr. Wang Zhao 2/12/15) Repeat due 8/2018     HX HIP REPLACEMENT Right     HX HIP REPLACEMENT Right     HX HYSTERECTOMY  1996    HX KNEE REPLACEMENT Right     HX KNEE REPLACEMENT Left 2019    HX VEIN STRIPPING      HX WISDOM TEETH EXTRACTION       Family History   Problem Relation Age of Onset    Hypertension Mother     Stroke Mother     Diabetes Mother     Hypertension Maternal Grandmother     Stroke Maternal Grandmother     Hypertension Sister     Hypertension Brother     Hypertension Brother     Hypertension Brother     Heart Disease Brother     Hypertension Brother     Diabetes Brother     Hypertension Brother     Diabetes Brother     Anesth Problems Neg Hx      Social History     Socioeconomic History    Marital status:      Spouse name: Not on file    Number of children: Not on file    Years of education: Not on file    Highest education level: Not on file   Occupational History    Not on file   Tobacco Use    Smoking status: Never Smoker    Smokeless tobacco: Never Used   Substance and Sexual Activity    Alcohol use: No     Comment: RARELY     Drug use: No    Sexual activity: Yes   Other Topics Concern    Not on file   Social History Narrative    Not on file     Social Determinants of Health     Financial Resource Strain:     Difficulty of Paying Living Expenses:    Food Insecurity:  Worried About [facility] in the Last Year:    [address] in the Last Year:    Transportation Needs:     Lack of Transportation (Medical):  Lack of Transportation (Non-Medical):    Physical Activity:     Days of Exercise per Week:     Minutes of Exercise per Session:    Stress:     Feeling of Stress :    Social Connections:     Frequency of Communication with Friends and Family:     Frequency of Social Gatherings with Friends and Family:     Attends [employer] Services:     Active Member of Clubs or Organizations:     Attends Club or Organization Meetings:     Marital Status:    Intimate Partner Violence:     Fear of Current or Ex-Partner:     Emotionally Abused:     Physically Abused:     Sexually Abused:        HPI      ROS:   Review of Systems   Constitutional: Negative for chills, fever and malaise/fatigue. Eyes: Negative for blurred vision. Respiratory: Negative for cough and shortness of breath. Cardiovascular: Negative for chest pain, palpitations and leg swelling. Skin:        blister   Neurological: Negative for dizziness and headaches. Objective:     Visit Vitals  BP (!) 143/77 (BP 1 Location: Left arm, BP Patient Position: Sitting, BP Cuff Size: Adult)   Pulse 77   Temp 97.6 °F (36.4 °C) (Skin)   Resp 16   Ht 5' 11\" (1.803 m)   Wt 207 lb (93.9 kg)   SpO2 97%   BMI 28.87 kg/m²         Vitals and Nurse Documentation reviewed. Physical Exam  Feet:      Right foot:      Skin integrity: Blister present. No skin breakdown. Toenail Condition: Right toenails are abnormally thick. Fungal disease present. Left foot:      Skin integrity: No blister or skin breakdown. Toenail Condition: Left toenails are abnormally thick. Fungal disease present. Comments: Blister plantar side is dark in color TTP        Results for orders placed or performed in visit on 08/10/21   HEPATITIS C AB   Result Value Ref Range    Hep C virus Ab Interp.  NONREACTIVE NONREACTIVE     METABOLIC PANEL, BASIC   Result Value Ref Range    Sodium 140 136 - 145 mmol/L    Potassium 4.6 3.5 - 5.1 mmol/L    Chloride 105 97 - 108 mmol/L    CO2 30 21 - 32 mmol/L    Anion gap 5 5 - 15 mmol/L    Glucose 100 65 - 100 mg/dL    BUN 19 6 - 20 MG/DL    Creatinine 0.61 0.55 - 1.02 MG/DL    BUN/Creatinine ratio 31 (H) 12 - 20      GFR est AA >60 >60 ml/min/1.73m2    GFR est non-AA >60 >60 ml/min/1.73m2    Calcium 9.4 8.5 - 10.1 MG/DL   LIPID PANEL   Result Value Ref Range    Cholesterol, total 198 <200 MG/DL    Triglyceride 72 <150 MG/DL    HDL Cholesterol 87 MG/DL    LDL, calculated 96.6 0 - 100 MG/DL    VLDL, calculated 14.4 MG/DL    CHOL/HDL Ratio 2.3 0.0 - 5.0     HEPATIC FUNCTION PANEL   Result Value Ref Range    Protein, total 7.6 6.4 - 8.2 g/dL    Albumin 3.8 3.5 - 5.0 g/dL    Globulin 3.8 2.0 - 4.0 g/dL    A-G Ratio 1.0 (L) 1.1 - 2.2      Bilirubin, total 0.5 0.2 - 1.0 MG/DL    Bilirubin, direct 0.2 0.0 - 0.2 MG/DL    Alk. phosphatase 68 45 - 117 U/L    AST (SGOT) 25 15 - 37 U/L    ALT (SGPT) 34 12 - 78 U/L   HEMOGLOBIN A1C WITH EAG   Result Value Ref Range    Hemoglobin A1c 6.4 (H) 4.0 - 5.6 %    Est. average glucose 137 mg/dL   CBC WITH AUTOMATED DIFF   Result Value Ref Range    WBC 5.4 3.6 - 11.0 K/uL    RBC 5.56 (H) 3.80 - 5.20 M/uL    HGB 14.3 11.5 - 16.0 g/dL    HCT 47.1 (H) 35.0 - 47.0 %    MCV 84.7 80.0 - 99.0 FL    MCH 25.7 (L) 26.0 - 34.0 PG    MCHC 30.4 30.0 - 36.5 g/dL    RDW 15.9 (H) 11.5 - 14.5 %    PLATELET 855 799 - 883 K/uL    MPV 10.9 8.9 - 12.9 FL    NRBC 0.0 0  WBC    ABSOLUTE NRBC 0.00 0.00 - 0.01 K/uL    NEUTROPHILS 48 32 - 75 %    LYMPHOCYTES 39 12 - 49 %    MONOCYTES 8 5 - 13 %    EOSINOPHILS 4 0 - 7 %    BASOPHILS 1 0 - 1 %    IMMATURE GRANULOCYTES 0 0.0 - 0.5 %    ABS. NEUTROPHILS 2.6 1.8 - 8.0 K/UL    ABS. LYMPHOCYTES 2.1 0.8 - 3.5 K/UL    ABS. MONOCYTES 0.4 0.0 - 1.0 K/UL    ABS. EOSINOPHILS 0.2 0.0 - 0.4 K/UL    ABS. BASOPHILS 0.1 0.0 - 0.1 K/UL    ABS. IMM. GRANS. 0.0 0.00 - 0.04 K/UL    DF AUTOMATED     MICROALBUMIN, UR, RAND W/ MICROALB/CREAT RATIO   Result Value Ref Range    Microalbumin,urine random 0.70 MG/DL    Creatinine, urine 98.00 mg/dL    Microalbumin/Creat ratio (mg/g creat) 7 0 - 30 mg/g   SAMPLES BEING HELD   Result Value Ref Range    SAMPLES BEING HELD 1SST     COMMENT        Add-on orders for these samples will be processed based on acceptable specimen integrity and analyte stability, which may vary by analyte.

## 2021-10-09 DIAGNOSIS — E78.00 HYPERCHOLESTEREMIA: ICD-10-CM

## 2021-10-09 DIAGNOSIS — I10 ESSENTIAL HYPERTENSION: ICD-10-CM

## 2021-10-11 RX ORDER — ENALAPRIL MALEATE AND HYDROCHLOROTHIAZIDE 5; 12.5 MG/1; MG/1
TABLET ORAL
Qty: 90 TABLET | Refills: 1 | Status: SHIPPED | OUTPATIENT
Start: 2021-10-11 | End: 2022-03-02 | Stop reason: SDUPTHER

## 2021-10-11 RX ORDER — ROSUVASTATIN CALCIUM 5 MG/1
TABLET, COATED ORAL
Qty: 90 TABLET | Refills: 1 | Status: SHIPPED | OUTPATIENT
Start: 2021-10-11 | End: 2022-03-02 | Stop reason: SDUPTHER

## 2022-03-02 ENCOUNTER — OFFICE VISIT (OUTPATIENT)
Dept: FAMILY MEDICINE CLINIC | Age: 82
End: 2022-03-02
Payer: MEDICARE

## 2022-03-02 VITALS
DIASTOLIC BLOOD PRESSURE: 74 MMHG | RESPIRATION RATE: 18 BRPM | OXYGEN SATURATION: 99 % | BODY MASS INDEX: 29.2 KG/M2 | HEART RATE: 87 BPM | WEIGHT: 208.6 LBS | TEMPERATURE: 97.6 F | SYSTOLIC BLOOD PRESSURE: 136 MMHG | HEIGHT: 71 IN

## 2022-03-02 DIAGNOSIS — Z00.00 MEDICARE ANNUAL WELLNESS VISIT, SUBSEQUENT: Primary | ICD-10-CM

## 2022-03-02 DIAGNOSIS — E78.00 HYPERCHOLESTEREMIA: ICD-10-CM

## 2022-03-02 DIAGNOSIS — I10 ESSENTIAL HYPERTENSION: ICD-10-CM

## 2022-03-02 DIAGNOSIS — M19.022 PRIMARY OSTEOARTHRITIS OF LEFT ELBOW: ICD-10-CM

## 2022-03-02 DIAGNOSIS — E11.9 TYPE 2 DIABETES MELLITUS WITHOUT COMPLICATION, WITHOUT LONG-TERM CURRENT USE OF INSULIN (HCC): ICD-10-CM

## 2022-03-02 LAB
ALBUMIN SERPL-MCNC: 3.8 G/DL (ref 3.5–5)
ALBUMIN/GLOB SERPL: 0.9 {RATIO} (ref 1.1–2.2)
ALP SERPL-CCNC: 75 U/L (ref 45–117)
ALT SERPL-CCNC: 33 U/L (ref 12–78)
ANION GAP SERPL CALC-SCNC: 4 MMOL/L (ref 5–15)
AST SERPL-CCNC: 27 U/L (ref 15–37)
BASOPHILS # BLD: 0.1 K/UL (ref 0–0.1)
BASOPHILS NFR BLD: 1 % (ref 0–1)
BILIRUB DIRECT SERPL-MCNC: 0.2 MG/DL (ref 0–0.2)
BILIRUB SERPL-MCNC: 0.5 MG/DL (ref 0.2–1)
BUN SERPL-MCNC: 15 MG/DL (ref 6–20)
BUN/CREAT SERPL: 21 (ref 12–20)
CALCIUM SERPL-MCNC: 9.6 MG/DL (ref 8.5–10.1)
CHLORIDE SERPL-SCNC: 105 MMOL/L (ref 97–108)
CHOLEST SERPL-MCNC: 193 MG/DL
CO2 SERPL-SCNC: 29 MMOL/L (ref 21–32)
CREAT SERPL-MCNC: 0.71 MG/DL (ref 0.55–1.02)
CREAT UR-MCNC: 125 MG/DL
DIFFERENTIAL METHOD BLD: ABNORMAL
EOSINOPHIL # BLD: 0.3 K/UL (ref 0–0.4)
EOSINOPHIL NFR BLD: 4 % (ref 0–7)
ERYTHROCYTE [DISTWIDTH] IN BLOOD BY AUTOMATED COUNT: 15.7 % (ref 11.5–14.5)
EST. AVERAGE GLUCOSE BLD GHB EST-MCNC: 137 MG/DL
GLOBULIN SER CALC-MCNC: 4.1 G/DL (ref 2–4)
GLUCOSE SERPL-MCNC: 111 MG/DL (ref 65–100)
HBA1C MFR BLD: 6.4 % (ref 4–5.6)
HCT VFR BLD AUTO: 46.2 % (ref 35–47)
HDLC SERPL-MCNC: 83 MG/DL
HDLC SERPL: 2.3 {RATIO} (ref 0–5)
HGB BLD-MCNC: 14.2 G/DL (ref 11.5–16)
IMM GRANULOCYTES # BLD AUTO: 0 K/UL (ref 0–0.04)
IMM GRANULOCYTES NFR BLD AUTO: 0 % (ref 0–0.5)
LDLC SERPL CALC-MCNC: 90.6 MG/DL (ref 0–100)
LYMPHOCYTES # BLD: 2.4 K/UL (ref 0.8–3.5)
LYMPHOCYTES NFR BLD: 39 % (ref 12–49)
MCH RBC QN AUTO: 25.6 PG (ref 26–34)
MCHC RBC AUTO-ENTMCNC: 30.7 G/DL (ref 30–36.5)
MCV RBC AUTO: 83.2 FL (ref 80–99)
MICROALBUMIN UR-MCNC: 0.97 MG/DL
MICROALBUMIN/CREAT UR-RTO: 8 MG/G (ref 0–30)
MONOCYTES # BLD: 0.5 K/UL (ref 0–1)
MONOCYTES NFR BLD: 8 % (ref 5–13)
NEUTS SEG # BLD: 3 K/UL (ref 1.8–8)
NEUTS SEG NFR BLD: 48 % (ref 32–75)
NRBC # BLD: 0 K/UL (ref 0–0.01)
NRBC BLD-RTO: 0 PER 100 WBC
PLATELET # BLD AUTO: 194 K/UL (ref 150–400)
PMV BLD AUTO: 12.4 FL (ref 8.9–12.9)
POTASSIUM SERPL-SCNC: 4.3 MMOL/L (ref 3.5–5.1)
PROT SERPL-MCNC: 7.9 G/DL (ref 6.4–8.2)
RBC # BLD AUTO: 5.55 M/UL (ref 3.8–5.2)
SODIUM SERPL-SCNC: 138 MMOL/L (ref 136–145)
TRIGL SERPL-MCNC: 97 MG/DL (ref ?–150)
VLDLC SERPL CALC-MCNC: 19.4 MG/DL
WBC # BLD AUTO: 6.2 K/UL (ref 3.6–11)

## 2022-03-02 PROCEDURE — G8427 DOCREV CUR MEDS BY ELIG CLIN: HCPCS | Performed by: FAMILY MEDICINE

## 2022-03-02 PROCEDURE — G8400 PT W/DXA NO RESULTS DOC: HCPCS | Performed by: FAMILY MEDICINE

## 2022-03-02 PROCEDURE — G8752 SYS BP LESS 140: HCPCS | Performed by: FAMILY MEDICINE

## 2022-03-02 PROCEDURE — 1090F PRES/ABSN URINE INCON ASSESS: CPT | Performed by: FAMILY MEDICINE

## 2022-03-02 PROCEDURE — G8432 DEP SCR NOT DOC, RNG: HCPCS | Performed by: FAMILY MEDICINE

## 2022-03-02 PROCEDURE — 99214 OFFICE O/P EST MOD 30 MIN: CPT | Performed by: FAMILY MEDICINE

## 2022-03-02 PROCEDURE — G8536 NO DOC ELDER MAL SCRN: HCPCS | Performed by: FAMILY MEDICINE

## 2022-03-02 PROCEDURE — G0439 PPPS, SUBSEQ VISIT: HCPCS | Performed by: FAMILY MEDICINE

## 2022-03-02 PROCEDURE — G8419 CALC BMI OUT NRM PARAM NOF/U: HCPCS | Performed by: FAMILY MEDICINE

## 2022-03-02 PROCEDURE — 1101F PT FALLS ASSESS-DOCD LE1/YR: CPT | Performed by: FAMILY MEDICINE

## 2022-03-02 PROCEDURE — G8754 DIAS BP LESS 90: HCPCS | Performed by: FAMILY MEDICINE

## 2022-03-02 RX ORDER — ROSUVASTATIN CALCIUM 5 MG/1
TABLET, COATED ORAL
Qty: 90 TABLET | Refills: 1 | Status: SHIPPED | OUTPATIENT
Start: 2022-03-02 | End: 2022-07-25

## 2022-03-02 RX ORDER — ENALAPRIL MALEATE AND HYDROCHLOROTHIAZIDE 5; 12.5 MG/1; MG/1
TABLET ORAL
Qty: 90 TABLET | Refills: 1 | Status: SHIPPED | OUTPATIENT
Start: 2022-03-02 | End: 2022-07-25

## 2022-03-02 NOTE — PROGRESS NOTES
This is the Subsequent Medicare Annual Wellness Exam, performed 12 months or more after the Initial AWV or the last Subsequent AWV    I have reviewed the patient's medical history in detail and updated the computerized patient record. Assessment/Plan   Education and counseling provided:  Are appropriate based on today's review and evaluation  End-of-Life planning (with patient's consent)    1. Medicare annual wellness visit, subsequent  2. Essential hypertension  -     enalapril-hydroCHLOROthiazide (VASERETIC) 5-12.5 mg tablet; TAKE 1 TABLET EVERY DAY, Normal, Disp-90 Tablet, R-1  -     METABOLIC PANEL, BASIC; Future  -     MICROALBUMIN, UR, RAND W/ MICROALB/CREAT RATIO; Future  3. Hypercholesteremia  -     rosuvastatin (CRESTOR) 5 mg tablet; TAKE 1 TABLET EVERY DAY, Normal, Disp-90 Tablet, R-1  -     HEPATIC FUNCTION PANEL; Future  -     LIPID PANEL; Future  4. Type 2 diabetes mellitus without complication, without long-term current use of insulin (HCC)  -     CBC WITH AUTOMATED DIFF; Future  -     HEMOGLOBIN A1C WITH EAG; Future  -      DIABETES FOOT EXAM  5. Primary osteoarthritis of left elbow       Depression Risk Factor Screening     3 most recent PHQ Screens 10/1/2021   Little interest or pleasure in doing things Not at all   Feeling down, depressed, irritable, or hopeless Not at all   Total Score PHQ 2 0       Alcohol & Drug Abuse Risk Screen    Do you average more than 1 drink per night or more than 7 drinks a week:  No    On any one occasion in the past three months have you have had more than 3 drinks containing alcohol:  No    Non smoker        Functional Ability and Level of Safety    Hearing: Hearing is good. Vision:  Due in March,    Dental:  Sees regularly     Activities of Daily Living: The home contains: no safety equipment. Patient does total self care      Ambulation: with mild difficulty Back Limits her. Fall Risk:  Fall Risk Assessment, last 12 mths 10/1/2021   Able to walk?  Yes Fall in past 12 months? 0   Do you feel unsteady?  0   Are you worried about falling 0      Abuse Screen:  Patient is not abused       Cognitive Screening    Has your family/caregiver stated any concerns about your memory: no     Cognitive Screening: Normal - interview    Health Maintenance Due     Health Maintenance Due   Topic Date Due    Shingrix Vaccine Age 49> (2 of 2) 02/28/2019    COVID-19 Vaccine (3 - Booster for Johnice Minks series) 08/31/2021    Eye Exam Retinal or Dilated  09/23/2021       Patient Care Team   Patient Care Team:  Jose Rick MD as PCP - General (Family Medicine)  Jose Rick MD as PCP - [facility] Empaneled Provider  Elida Winter MD as Physician (Neurology)  Vanessa Severs, MD (Neurosurgery)    History     Patient Active Problem List   Diagnosis Code    Cataract H26.9    HTN (hypertension) I10    Diabetes ([address] E11.9    Hypercholesteremia E78.00    Cervical stenosis of spinal canal M48.02     Past Medical History:   Diagnosis Date    Arthritis     KNEES     Cataract     Colonic polyp     Diverticulosis     Hemorrhoids     Large carpet -like rectal polyps  seen on coloscopy  2015    Hyperlipidemia     PT STATES NOT     Hypertension     Onychomycosis     Prediabetes     Sciatica     Transient global amnesia     h/o       Past Surgical History:   Procedure Laterality Date    HX CATARACT REMOVAL Right     HX COLONOSCOPY  2014 12/2014: ext hemorrhoids, diverticulosis, carpet-like polyp (resected by Dr. Carolin Serrano 2/12/15) Repeat due 8/2018     HX COLONOSCOPY  02/2022    Normal, Dr. Carolin Serrano, repeat 5 years    HX HIP REPLACEMENT Right     HX HIP REPLACEMENT Right     HX HYSTERECTOMY  1996    HX KNEE REPLACEMENT Right     HX KNEE REPLACEMENT Left 2019    HX VEIN STRIPPING      HX WISDOM TEETH EXTRACTION       Current Outpatient Medications   Medication Sig Dispense Refill    enalapril-hydroCHLOROthiazide (VASERETIC) 5-12.5 mg tablet TAKE 1 TABLET EVERY DAY 90 Tablet 1    rosuvastatin (CRESTOR) 5 mg tablet TAKE 1 TABLET EVERY DAY 90 Tablet 1    efinaconazole (JUBLIA) kali topical solution Apply to affected area daily 8 mL 1    acetaminophen (TYLENOL) 325 mg tablet Take 2 Tabs by mouth every six (6) hours as needed for Pain. 1 Tab 0    garlic 6,044 mg cap Take 1,000 mg by mouth daily.  docosahexanoic acid/epa (FISH OIL PO) Take  by mouth.  MULTIVITAMIN PO Take  by mouth.  calcium carbonate-vitamin d2 500 mg(1,250mg) -200 unit tab Take  by mouth. Allergies   Allergen Reactions    Penicillins Rash and Swelling     None noted        Family History   Problem Relation Age of Onset    Hypertension Mother     Stroke Mother     Diabetes Mother     Hypertension Maternal Grandmother     Stroke Maternal Grandmother     Hypertension Sister     Hypertension Brother     Hypertension Brother     Hypertension Brother     Heart Disease Brother     Hypertension Brother     Diabetes Brother     Hypertension Brother     Diabetes Brother     Anesth Problems Neg Hx      Social History     Tobacco Use    Smoking status: Never Smoker    Smokeless tobacco: Never Used   Substance Use Topics    Alcohol use: No     Comment: RARELY          Catrachito Ramos MD     Almeta Handsome  80 y.o. female  1940  DVD:[id number]  [facility] MEDICINE  Progress Note     Encounter Date: 3/2/2022    Assessment and Plan:     Encounter Diagnoses     ICD-10-CM ICD-9-CM   1. Medicare annual wellness visit, subsequent  Z00.00 V70.0   2. Essential hypertension  I10 401.9   3. Hypercholesteremia  E78.00 272.0   4. Type 2 diabetes mellitus without complication, without long-term current use of insulin (HCC)  E11.9 250.00   5. Primary osteoarthritis of left elbow  M19.022 715.12       1. Medicare annual wellness visit, subsequent  Updated  Has had COVID shots. Consider shingrix #2 in summer    2.  Essential hypertension  At goal  - enalapril-hydroCHLOROthiazide (VASERETIC) 5-12.5 mg tablet; TAKE 1 TABLET EVERY DAY  Dispense: 90 Tablet; Refill: 1  - METABOLIC PANEL, BASIC; Future  - MICROALBUMIN, UR, RAND W/ MICROALB/CREAT RATIO; Future    3. Hypercholesteremia  Check status  - rosuvastatin (CRESTOR) 5 mg tablet; TAKE 1 TABLET EVERY DAY  Dispense: 90 Tablet; Refill: 1  - HEPATIC FUNCTION PANEL; Future  - LIPID PANEL; Future    4. Type 2 diabetes mellitus without complication, without long-term current use of insulin (HCC)  Diet only. Diet discussed. Feet ok. Eye exam this month. - CBC WITH AUTOMATED DIFF; Future  - HEMOGLOBIN A1C WITH EAG; Future  -  DIABETES FOOT EXAM    5. DJD elbow left  ROM exercises and voltaren cream.      I have discussed the diagnosis with the patient and the intended plan as seen in the above orders. she has expressed understanding. The patient has received an after-visit summary and questions were answered concerning future plans. I have discussed medication side effects and warnings with the patient as well. Electronically Signed: Harjeet Cespedes MD    Current Medications after this visit     Current Outpatient Medications   Medication Sig    enalapril-hydroCHLOROthiazide (VASERETIC) 5-12.5 mg tablet TAKE 1 TABLET EVERY DAY    rosuvastatin (CRESTOR) 5 mg tablet TAKE 1 TABLET EVERY DAY    efinaconazole (JUBLIA) kali topical solution Apply to affected area daily    acetaminophen (TYLENOL) 325 mg tablet Take 2 Tabs by mouth every six (6) hours as needed for Pain.  garlic 1,382 mg cap Take 1,000 mg by mouth daily.  docosahexanoic acid/epa (FISH OIL PO) Take  by mouth.  MULTIVITAMIN PO Take  by mouth.  calcium carbonate-vitamin d2 500 mg(1,250mg) -200 unit tab Take  by mouth. No current facility-administered medications for this visit.      Medications Discontinued During This Encounter   Medication Reason    rosuvastatin (CRESTOR) 5 mg tablet REORDER    enalapril-hydroCHLOROthiazide (VASERETIC) 5-12.5 mg tablet REORDER     ~~~~~~~~~~~~~~~~~~~~~~~~~~~~~~~~~~~~~~~~~~~~~~~~~~~~~~~~~~~    Chief Complaint   Patient presents with    Annual Wellness Visit    Elbow Pain     Left elbow  pain x 2 months        History provided by patient  History of Present Illness   Kameron Ludwig is a 80 y.o. female who presents to clinic today for:  Annual Wellness Visit and Elbow Pain (Left elbow  pain x 2 months )    Hypertension  At goal  Takes meds consistently    Hypercholesterolemia  On statin    DM2  Diet only  No problems  Eye exam this month  Feet OK    Health Maintenance  Completed HM gaps at today's visit  Health Maintenance Due   Topic Date Due    Shingrix Vaccine Age 49> (2 of 2) 02/28/2019    COVID-19 Vaccine (3 - Booster for Moderna series) 08/31/2021    Eye Exam Retinal or Dilated  09/23/2021     Review of Systems   Review of Systems   Respiratory: Negative for shortness of breath. Cardiovascular: Negative for chest pain and leg swelling. Gastrointestinal: Negative for blood in stool. Genitourinary: Negative for hematuria. Musculoskeletal: Positive for joint pain. Psychiatric/Behavioral: Negative. Vitals/Objective:     Vitals:    03/02/22 0946 03/02/22 0953   BP: (!) 147/85 136/74   Pulse: 87    Resp: 18    Temp: 97.6 °F (36.4 °C)    TempSrc: Temporal    SpO2: 99%    Weight: 208 lb 9.6 oz (94.6 kg)    Height: 5' 11\" (1.803 m)      Body mass index is 29.09 kg/m². Wt Readings from Last 3 Encounters:   03/02/22 208 lb 9.6 oz (94.6 kg)   10/01/21 207 lb (93.9 kg)   08/10/21 206 lb 3.2 oz (93.5 kg)         Objective  Physical Exam  Vitals and nursing note reviewed. Constitutional:       Appearance: Normal appearance. She is not toxic-appearing. HENT:      Head: Normocephalic and atraumatic. Cardiovascular:      Rate and Rhythm: Normal rate and regular rhythm. Heart sounds: Normal heart sounds. No murmur heard. No gallop.     Pulmonary:      Effort: Pulmonary effort is normal. No respiratory distress. Breath sounds: Normal breath sounds. No wheezing, rhonchi or rales. Musculoskeletal:      Cervical back: No muscular tenderness. Lymphadenopathy:      Cervical: No cervical adenopathy. Neurological:      Mental Status: She is alert. Psychiatric:         Mood and Affect: Mood normal.         Behavior: Behavior normal.         Thought Content: Thought content normal.         Judgment: Judgment normal.          Left elbow  Pain with full extension in lateral elbow   Otherwise normal exam    Diabetic Foot Exam:  Protective sensation is intact bilaterally. Pedal pulses are 2+ and normal bilaterally. L foot skin inspection:  normal skin and soft tissue with no gross edema or evidence of acute injury or foot ulcer  R foot skin inspection:  skin and soft tissue appear normal with no significant edema or evidence of acute injury or foot ulcer     No results found for this or any previous visit (from the past 24 hour(s)). Disposition     Follow-up and Dispositions  ·   Return in about 6 months (around 9/2/2022) for Blood pressure follow up, Diabetes follow up, Medication follow up. No future appointments. History   Patient's past medical, surgical and family histories were reviewed and updated.     Past Medical History:   Diagnosis Date    Arthritis     KNEES     Cataract     Colonic polyp     Diverticulosis     Hemorrhoids     Large carpet -like rectal polyps  seen on coloscopy  2015    Hyperlipidemia     PT STATES NOT     Hypertension     Onychomycosis     Prediabetes     Sciatica     Transient global amnesia     h/o      Past Surgical History:   Procedure Laterality Date    HX CATARACT REMOVAL Right     HX COLONOSCOPY  2014 12/2014: ext hemorrhoids, diverticulosis, carpet-like polyp (resected by Dr. Rick Mendez 2/12/15) Repeat due 8/2018     HX COLONOSCOPY  02/2022    Normal, Dr. Rick Mendez, repeat 5 years    HX HIP REPLACEMENT Right     HX HIP REPLACEMENT Right     HX HYSTERECTOMY  1996    HX KNEE REPLACEMENT Right     HX KNEE REPLACEMENT Left 2019    HX VEIN STRIPPING      HX WISDOM TEETH EXTRACTION       Family History   Problem Relation Age of Onset    Hypertension Mother     Stroke Mother     Diabetes Mother     Hypertension Maternal Grandmother     Stroke Maternal Grandmother     Hypertension Sister     Hypertension Brother     Hypertension Brother     Hypertension Brother     Heart Disease Brother     Hypertension Brother     Diabetes Brother     Hypertension Brother     Diabetes Brother     Anesth Problems Neg Hx      Social History     Tobacco Use    Smoking status: Never Smoker    Smokeless tobacco: Never Used   Substance Use Topics    Alcohol use: No     Comment: RARELY     Drug use: No       Allergies     Allergies   Allergen Reactions    Penicillins Rash and Swelling     None noted

## 2022-03-02 NOTE — PATIENT INSTRUCTIONS
Medicare Wellness Visit, Female     The best way to live healthy is to have a lifestyle where you eat a well-balanced diet, exercise regularly, limit alcohol use, and quit all forms of tobacco/nicotine, if applicable. Regular preventive services are another way to keep healthy. Preventive services (vaccines, screening tests, monitoring & exams) can help personalize your care plan, which helps you manage your own care. Screening tests can find health problems at the earliest stages, when they are easiest to treat. [facility] follows the current, evidence-based guidelines published by the [facility] Conner ([facility]) when recommending preventive services for our patients. Because we follow these guidelines, sometimes recommendations change over time as research supports it. (For example, mammograms used to be recommended annually. Even though Medicare will still pay for an annual mammogram, the newer guidelines recommend a mammogram every two years for women of average risk). Of course, you and your doctor may decide to screen more often for some diseases, based on your risk and your co-morbidities (chronic disease you are already diagnosed with). Preventive services for you include:  - Medicare offers their members a free annual wellness visit, which is time for you and your primary care provider to discuss and plan for your preventive service needs. Take advantage of this benefit every year!  -All adults over the age of 72 should receive the recommended pneumonia vaccines. Current USPSTF guidelines recommend a series of two vaccines for the best pneumonia protection.   -All adults should have a flu vaccine yearly and a tetanus vaccine every 10 years.   -All adults age 48 and older should receive the shingles vaccines (series of two vaccines).       -All adults age 38-68 who are overweight should have a diabetes screening test once every three years.   -All adults born between [birth year] and 1963 should be screened once for Hepatitis C.  -Other screening tests and preventive services for persons with diabetes include: an eye exam to screen for diabetic retinopathy, a kidney function test, a foot exam, and stricter control over your cholesterol.   -Cardiovascular screening for adults with routine risk involves an electrocardiogram (ECG) at intervals determined by your doctor.   -Colorectal cancer screenings should be done for adults age 54-65 with no increased risk factors for colorectal cancer. There are a number of acceptable methods of screening for this type of cancer. Each test has its own benefits and drawbacks. Discuss with your doctor what is most appropriate for you during your annual wellness visit. The different tests include: colonoscopy (considered the best screening method), a fecal occult blood test, a fecal DNA test, and sigmoidoscopy.    -A bone mass density test is recommended when a woman turns 65 to screen for osteoporosis. This test is only recommended one time, as a screening. Some providers will use this same test as a disease monitoring tool if you already have osteoporosis. -Breast cancer screenings are recommended every other year for women of normal risk, age 54-69.  -Cervical cancer screenings for women over age 72 are only recommended with certain risk factors.      Here is a list of your current Health Maintenance items (your personalized list of preventive services) with a due date:  Health Maintenance Due   Topic Date Due    Shingles Vaccine (2 of 2) 02/28/2019    Diabetic Foot Care  10/16/2020    COVID-19 Vaccine (3 - Booster for Moderna series) 08/31/2021    Eye Exam  09/23/2021       Diabetes:  Blood sugar goals:  Hemoglobin A1c under 7  Fasting blood sugar   Blood sugar 2 hours after a meal under 180, 4 hours after a meal under 120  No hypoglycemia (sugars under 70 and symptomatic low sugars)    Blood sugar control with diet and exercise:  Exercise 45 minutes per day. This makes your insulin work better. It also allows insulin levels to fall helping with weight loss. Every night, try to fast from your evening meal to breakfast (at least 12 hours) without eating anything. This uses stored energy in your liver and makes insulin work better. Avoid simples sugars such as table sugar in drinks (sodas, lemonade, sweet tea, wine), desserts, candy. Also avoid fruit juices and high fructose corn syrup. Avoid frequent consumption of fruit especially grapes and bananas. A single serving of fruit daily is all you should have. Finally, drink less milk which has milk sugar in it. Control your starch intake. Men should have 3-4 carb portions per meal.  Women should have 2-3 carb portions per meal.  A carb serving is the equivalent of a slice of bread. Control your blood pressure and cholesterol. These problems are common in diabetes. AND, don't smoke. All of these problems contribute to heart disease and stroke risk. Get a yearly eye exam and flu shot. Make sure your vaccines are up to date particularly the pneumonia vaccines. Inspect your feet daily. Wear comfortable protective shoes and clean socks. Seek medical care if there are sore, calluses or numbness and burning of your feet. See your doctor at least every 6 months if you are on oral medicines or more often if the diabetic control is not at goal or if you are on insulin. Take your medicines religiously. The goal blood pressure for most patients is 140/90. The whole point of treating blood pressure is to prevent strokes, heart attacks and kidney damage. Persistently elevated blood pressure damages blood vessels and can lead to catastrophic heart problems and strokes. Recommendations for Hypertension (elevated blood pressure):    · Purchase a blood pressure cuff that goes around your upper arm and check blood pressure at least 3 times per week. Write down your numbers for review. Check blood pressure in the morning and evening. Rest for 5 minutes with feet elevated and arm resting on a table (at mid-chest level) when checking blood pressure    · Exercise 30-60 minutes most days of the week, preferably with a mix of cardiovascular and strength training. Exercise can improve blood pressure, even if you do not lose weight! · Limit alcohol intake to less than 3-4 drinks per week. · Avoid tobacco products    · Avoid illegal drugs especially amphetamines  · DASH diet - includes fruits, vegetables, fiber, and less than 2000 mg sodium (salt) daily. · Try to eat a low sugar diet. Sugar worsens diabetes and activates kidney hormones that raise blood pressure. · Avoid non-steroidal inflammatory medications (NSAIDS) such as ibuprofen, advil, motrin, aleve, and naproxyn as these may increase blood pressure if used long-term    · Avoid OTC decongestants such as pseudopherine, phenylephrine, Afrin  · Take your blood pressure medicine (and aspirin if prescribed) religiously            STOP the SUGAR    The first step in dietary efforts at weight loss is removing as much sugar from the diet as possible. Most dietary sugar is in the forms of table sugar (sucrose), fruit sugar (fructose) and milk sugar (lactose). But, you need to watch labels to see if processed foods have added sugars under other names. To eliminate sucrose, eliminate sweet drinks entirely including soft drinks, sports drinks, lemonade, sweet tea and wine. Also, eliminate candy and make desserts a \"special occasion only treat\". Don't eat desserts with every meal or every night. Most fructose is found in fruit and this should be markedly limited. Fruit juice should be avoided. If you do eat fruit, eat fruits that are lower in sugar such as: strawberries, blackberries, raspberries, lemon, grapefruit and watermelon. Eat small portions and think of the fruit as a garnish or small treat. Bananas, mangos, cherries and grapes are higher in sugar and should be avoided. Avoid high fructose corn syrup (an artificial sweetener). Milk sugar, or lactose, should be avoided as well. Milk is a good source of calcium but not for people struggling with weight issues. Put a little cream in your coffee or tea but otherwise avoid milk. How can you avoid sugar? For starters, don't buy it and don't bring it in the house. It won't tempt you that way! For more information:    Try the internet for videos about sugar addiction or try diet [employer]. So, what can I eat? 1) Protein-protein consumption promotes weight loss. Eat protein at every meal.  Good sources of protein include meat, fish, poultry and eggs. 2) More soluble fiber-soluble fiber helps us feel \"full\" and helps improve many markers for cardiovascular disease. But, we have to watch out for products with added sugar or large carb servings! Sources of soluble fiber include oatmeal, root vegetables such as sweet potatoes, turnips and carrots, vegetables such as broccoli and Brussel sprouts. 3) Healthy fats and oils-certain fats are better for our blood vessels and cardiovascular system. The oils in fish and seafood tend to be better for us as well as olive oil and the nuts on trees (walnuts, almonds, pistachios and hazelnuts). So, again, try to eat more fish. Use olive oil for cooking and for salad dressings. Nuts can be used in salads and in other dishes and they make a good low carb snack. 4) Non starchy vegetables-Green and yellow vegetables offer a lot of nutrients and very low amounts of carbs that can lead to weight gain. Salads can add a lot to our diet and also pack in a lot of nutrients. Cautions:  avoid starchy vegetables such as potatoes, corn and peas. Also, be careful about what is added to vegetables such as fruit or salad dressings that contain sugar.     5) Full fat dairy products-Cheeses make a good snack or appetizer. Cottage cheese can be a good basis for breakfast.  Yogurt is a good addition to our diet but watch out for yogurt that has added sugar. Avoid milk. EXERCISE AND WEIGHT LOSS    You'll hear lots of different things about weight loss and exercise. There is controversy about how much exercise helps with weight loss. We'll review what you should do about exercise and a weight plan here. First, it is hard to lose weight just with exercise. It takes about 3500 extra calories burned to lose a single pound. To put exercise in perspective, most people burn about 150 calories per mile if they walk or run. Doing the math, it takes over 23 miles to burn up the energy to lose one pound. So if you want to lose weight, exercise by itself is unlikely to help with weight loss very much. You need to work on diet and exercise at the same time to lose weight. And, you need to work on your habits and emotions since they have huge impacts on eating behaviors. But, exercise does help with weight loss. If you exercise, you burn stored fat in your muscles and that allows the levels of insulin in your body to fall. This allows the enzyme that burns fat to \"switch on\" and use stored fat for energy. That combined with a no sugar, lower starch diet combines to promote weight loss. Think of it this way:  exercise permits weight loss! Most people that lose weight and keep it off exercise. What's the right exercise? The best exercise is the one you enjoy and can keep doing! Exercise that makes you feel good physically and makes you feel good about yourself is ideal.      Exercise that elevates your heart rate for a sustained period such as running, biking,  walking and swimming improves your cardiovascular fitness. Resistance exercises such as weight lifting, strength training or calisthenics also clearly improve cardiovascular health and weight control.   Stretching and yoga help with flexibility and balance. Ideally, an exercise program should include all of these different types of exercise. How much should I exercise? For weight loss, you should aim to exercise 45 minutes per day, 5-6 days per week. When you exercise 45 minutes, you exhaust the supply of fat your muscles store for energy and you help you body turn on the enzyme that burns stored fat elsewhere. This is the minimum amount of exercise you should shoot for. Remember, you don't need to think of exercise as strictly an organized period of time where that's all you do. You can increase your exercise in all your daily activities. Walk more at work or school. If you can complete an errand by walking instead of driving, do it. Park farther away from the store if you go shopping and force yourself to walk farther. On breaks at work, walk around the office and greet your coworkers instead of sitting at your desk. Sherry Mas a few calories and enjoy the company of others. Go for a walk around the sports field while the kids practice sports. Take another parent with you. What are other benefits of exercise? While exercise helps you lose weight, it is helping you in lots of other ways. Exercise lowers your risk of diabetes and high blood pressure and makes your heart healthier. It lowers your risk of heart attacks. Exercise can help chronic lung disease and congestive heart failure improve. Exercise lowers the risk of dementia including Alzheimer's disease. Exercise lowers the risk of some cancers and decreases the risk of osteoporosis. And interestingly, exercise helps with emotionally health and lowers the risk and severity of emotional illnesses such as depression. Said simply, exercise helps you live longer and better. What will help me keep up the exercise? Remember that exercise is your gift to yourself and your family.   So schedule time for your exercise and be selfish about guarding that time. It's yours! Exercise with a friend or friends and make exercise a social activity that you look forward to. Friends keep each other honest and accountable. Think of how you feel when you exercise. Most people just feel better physically and emotionally. Remember that feeling and try to recapture it. Remember exercise will help you live longer and better and will help you be there for your children and grandchildren. Make that commitment for your family. And don't forget to tell yourself that while you feel better you look better! Nelda Pak said it:  \"You look marvelous! \"

## 2022-03-02 NOTE — PROGRESS NOTES
Orville Delgadillo is a 80 y.o. female      Chief Complaint   Patient presents with    Annual Wellness Visit    Elbow Pain     Left elbow  pain x 2 months          1. Have you been to the ER, urgent care clinic since your last visit? Hospitalized since your last visit? No     2. Have you seen or consulted any other health care providers outside of the [address] [place] [address] since your last visit? Include any pap smears or colon screening.   Dr Sada Lowery ( [facility]  )

## 2022-03-03 NOTE — PROGRESS NOTES
Your blood work is OK. The cholesterol is well controlled. The blood sugar is elevated but in a prediabetic range. As long as you stay active and stick to a diabetic diet, you will not need diabetes medication. The other labs look good. Stick to your diet and medications and see me in 6 months and as needed.   [facility]

## 2022-03-18 PROBLEM — E11.9 DIABETES (HCC): Status: ACTIVE | Noted: 2018-10-17

## 2022-03-19 PROBLEM — I10 HTN (HYPERTENSION): Status: ACTIVE | Noted: 2018-10-17

## 2022-03-19 PROBLEM — M48.02 CERVICAL STENOSIS OF SPINAL CANAL: Status: ACTIVE | Noted: 2020-11-30

## 2022-03-19 PROBLEM — H26.9 CATARACT: Status: ACTIVE | Noted: 2018-10-17

## 2022-03-20 PROBLEM — E78.00 HYPERCHOLESTEREMIA: Status: ACTIVE | Noted: 2018-10-17

## 2022-05-10 ENCOUNTER — TELEPHONE (OUTPATIENT)
Dept: FAMILY MEDICINE CLINIC | Age: 82
End: 2022-05-10

## 2022-05-10 NOTE — TELEPHONE ENCOUNTER
Pt tested positive for Covid on 05/06/22 and would like some advice on recovery/precautions.     [phone]

## 2022-05-11 ENCOUNTER — VIRTUAL VISIT (OUTPATIENT)
Dept: FAMILY MEDICINE CLINIC | Age: 82
End: 2022-05-11
Payer: MEDICARE

## 2022-05-11 DIAGNOSIS — U07.1 COVID-19: Primary | ICD-10-CM

## 2022-05-11 PROCEDURE — 99213 OFFICE O/P EST LOW 20 MIN: CPT | Performed by: NURSE PRACTITIONER

## 2022-05-11 PROCEDURE — G8536 NO DOC ELDER MAL SCRN: HCPCS | Performed by: NURSE PRACTITIONER

## 2022-05-11 PROCEDURE — G8400 PT W/DXA NO RESULTS DOC: HCPCS | Performed by: NURSE PRACTITIONER

## 2022-05-11 PROCEDURE — G8432 DEP SCR NOT DOC, RNG: HCPCS | Performed by: NURSE PRACTITIONER

## 2022-05-11 PROCEDURE — G8756 NO BP MEASURE DOC: HCPCS | Performed by: NURSE PRACTITIONER

## 2022-05-11 PROCEDURE — 1090F PRES/ABSN URINE INCON ASSESS: CPT | Performed by: NURSE PRACTITIONER

## 2022-05-11 PROCEDURE — G8419 CALC BMI OUT NRM PARAM NOF/U: HCPCS | Performed by: NURSE PRACTITIONER

## 2022-05-11 PROCEDURE — G8427 DOCREV CUR MEDS BY ELIG CLIN: HCPCS | Performed by: NURSE PRACTITIONER

## 2022-05-11 PROCEDURE — 1101F PT FALLS ASSESS-DOCD LE1/YR: CPT | Performed by: NURSE PRACTITIONER

## 2022-05-11 NOTE — PROGRESS NOTES
Rolando Elizabeth is a 80 y.o. female who was seen by synchronous (real-time) audio-video technology on 5/11/2022 for Concern For COVID-19 (Coronavirus)        Assessment & Plan:   Diagnoses and all orders for this visit:    1. COVID-19    Patient was seen by virtual video. Patient began having cold like symptoms over 1 week ago. This past Friday she was tested for COVID-19 after being exposed to others that did have COVID. She was positive. She has been immunized and boosted. She reports only mild cold-like symptoms. She denies any shortness of breath or fevers. I recommended over-the-counter medication for symptom relief and if she developed uncontrolled fever or shortness of breath she should go to the emergency room. I spent at least 20 minutes on this visit with this established patient. Subjective:       Prior to Admission medications    Medication Sig Start Date End Date Taking? Authorizing Provider   enalapril-hydroCHLOROthiazide (VASERETIC) 5-12.5 mg tablet TAKE 1 TABLET EVERY DAY 3/2/22   Harjit Colin MD   rosuvastatin (CRESTOR) 5 mg tablet TAKE 1 TABLET EVERY DAY 3/2/22   Harjit Colin MD   efinaconazole (JUBLIA) kali topical solution Apply to affected area daily 10/1/21   Brielle Thacker NP   acetaminophen (TYLENOL) 325 mg tablet Take 2 Tabs by mouth every six (6) hours as needed for Pain. 12/1/20   Benja Bridges, PA   garlic 6,511 mg cap Take 1,000 mg by mouth daily. Provider, Historical   docosahexanoic acid/epa (FISH OIL PO) Take  by mouth. Provider, Historical   MULTIVITAMIN PO Take  by mouth. Provider, Historical   calcium carbonate-vitamin d2 500 mg(1,250mg) -200 unit tab Take  by mouth.     Provider, Historical     Patient Active Problem List   Diagnosis Code    Cataract H26.9    HTN (hypertension) I10    Diabetes ([facility] [address] E11.9    Hypercholesteremia E78.00    Cervical stenosis of spinal canal M48.02     Patient Active Problem List    Diagnosis Date Noted  Cervical stenosis of spinal canal 11/30/2020    Cataract 10/17/2018    HTN (hypertension) 10/17/2018    Diabetes ([facility] [address] 10/17/2018    Hypercholesteremia 10/17/2018     Current Outpatient Medications   Medication Sig Dispense Refill    enalapril-hydroCHLOROthiazide (VASERETIC) 5-12.5 mg tablet TAKE 1 TABLET EVERY DAY 90 Tablet 1    rosuvastatin (CRESTOR) 5 mg tablet TAKE 1 TABLET EVERY DAY 90 Tablet 1    efinaconazole (JUBLIA) kali topical solution Apply to affected area daily 8 mL 1    acetaminophen (TYLENOL) 325 mg tablet Take 2 Tabs by mouth every six (6) hours as needed for Pain. 1 Tab 0    garlic 2,530 mg cap Take 1,000 mg by mouth daily.  docosahexanoic acid/epa (FISH OIL PO) Take  by mouth.  MULTIVITAMIN PO Take  by mouth.  calcium carbonate-vitamin d2 500 mg(1,250mg) -200 unit tab Take  by mouth. Allergies   Allergen Reactions    Penicillins Rash and Swelling     None noted      Past Medical History:   Diagnosis Date    Arthritis     KNEES     Cataract     Colonic polyp     Diverticulosis     Hemorrhoids     Large carpet -like rectal polyps  seen on coloscopy  2015    Hyperlipidemia     PT STATES NOT     Hypertension     Onychomycosis     Prediabetes     Sciatica     Transient global amnesia     h/o        Review of Systems   Constitutional: Negative for fever and malaise/fatigue. HENT: Positive for congestion. Negative for sinus pain and sore throat. Respiratory: Negative for cough and shortness of breath. Cardiovascular: Negative for chest pain. Gastrointestinal: Negative for diarrhea, nausea and vomiting. Genitourinary: Negative for dysuria. Musculoskeletal: Negative. Skin: Negative. Neurological: Negative for dizziness and headaches. Endo/Heme/Allergies: Negative for environmental allergies. Psychiatric/Behavioral: Negative.         Objective:     Patient-Reported Vitals 5/10/2022   Patient-Reported Weight 189   Patient-Reported Height 5'-7\"   Patient-Reported Pulse 84   Patient-Reported Temperature 97.2   Patient-Reported Systolic  532   Patient-Reported Diastolic 72        [INSTRUCTIONS:  \"[x]\" Indicates a positive item  \"[]\" Indicates a negative item  -- DELETE ALL ITEMS NOT EXAMINED]    Constitutional: [x] Appears well-developed and well-nourished [x] No apparent distress      [] Abnormal -     Mental status: [x] Alert and awake  [x] Oriented to person/place/time [x] Able to follow commands    [] Abnormal -     Eyes:   EOM    [x]  Normal    [] Abnormal -   Sclera  [x]  Normal    [] Abnormal -          Discharge [x]  None visible   [] Abnormal -     HENT: [x] Normocephalic, atraumatic  [] Abnormal -   [x] Mouth/Throat: Mucous membranes are moist    External Ears [x] Normal  [] Abnormal -    Neck: [x] No visualized mass [] Abnormal -     Pulmonary/Chest: [x] Respiratory effort normal   [x] No visualized signs of difficulty breathing or respiratory distress        [] Abnormal -      Musculoskeletal:   [x] Normal gait with no signs of ataxia         [x] Normal range of motion of neck        [] Abnormal -     Neurological:        [x] No Facial Asymmetry (Cranial nerve 7 motor function) (limited exam due to video visit)          [x] No gaze palsy        [] Abnormal -          Skin:        [x] No significant exanthematous lesions or discoloration noted on facial skin         [] Abnormal -            Psychiatric:       [x] Normal Affect [] Abnormal -        [x] No Hallucinations    Other pertinent observable physical exam findings:-        We discussed the expected course, resolution and complications of the diagnosis(es) in detail. Medication risks, benefits, costs, interactions, and alternatives were discussed as indicated. I advised her to contact the office if her condition worsens, changes or fails to improve as anticipated. She expressed understanding with the diagnosis(es) and plan.        Lynn Holly, was evaluated through a synchronous (real-time) audio-video encounter. The patient (or guardian if applicable) is aware that this is a billable service, which includes applicable co-pays. Verbal consent to proceed has been obtained. The visit was conducted pursuant to the emergency declaration under the [address] [address], [address] [place] [address] authority and the [employer] General Act. Patient identification was verified, and a caregiver was present when appropriate. The patient was located at home in a state where the provider was licensed to provide care.       Yonas Oro NP

## 2022-07-25 DIAGNOSIS — I10 ESSENTIAL HYPERTENSION: ICD-10-CM

## 2022-07-25 DIAGNOSIS — E78.00 HYPERCHOLESTEREMIA: ICD-10-CM

## 2022-07-25 RX ORDER — ROSUVASTATIN CALCIUM 5 MG/1
TABLET, COATED ORAL
Qty: 90 TABLET | Refills: 1 | Status: SHIPPED | OUTPATIENT
Start: 2022-07-25 | End: 2022-09-15 | Stop reason: SDUPTHER

## 2022-07-25 RX ORDER — ENALAPRIL MALEATE AND HYDROCHLOROTHIAZIDE 5; 12.5 MG/1; MG/1
TABLET ORAL
Qty: 90 TABLET | Refills: 1 | Status: SHIPPED | OUTPATIENT
Start: 2022-07-25 | End: 2022-09-15 | Stop reason: SDUPTHER

## 2022-09-15 ENCOUNTER — OFFICE VISIT (OUTPATIENT)
Dept: FAMILY MEDICINE CLINIC | Age: 82
End: 2022-09-15
Payer: MEDICARE

## 2022-09-15 VITALS
DIASTOLIC BLOOD PRESSURE: 80 MMHG | HEART RATE: 85 BPM | SYSTOLIC BLOOD PRESSURE: 164 MMHG | TEMPERATURE: 97.9 F | HEIGHT: 71 IN | OXYGEN SATURATION: 99 % | WEIGHT: 204 LBS | RESPIRATION RATE: 17 BRPM | BODY MASS INDEX: 28.56 KG/M2

## 2022-09-15 DIAGNOSIS — E78.00 HYPERCHOLESTEREMIA: ICD-10-CM

## 2022-09-15 DIAGNOSIS — E11.9 TYPE 2 DIABETES MELLITUS WITHOUT COMPLICATION, WITHOUT LONG-TERM CURRENT USE OF INSULIN (HCC): Primary | ICD-10-CM

## 2022-09-15 DIAGNOSIS — M54.31 SCIATICA OF RIGHT SIDE: ICD-10-CM

## 2022-09-15 DIAGNOSIS — I10 ESSENTIAL HYPERTENSION: ICD-10-CM

## 2022-09-15 PROCEDURE — 1090F PRES/ABSN URINE INCON ASSESS: CPT | Performed by: FAMILY MEDICINE

## 2022-09-15 PROCEDURE — G8400 PT W/DXA NO RESULTS DOC: HCPCS | Performed by: FAMILY MEDICINE

## 2022-09-15 PROCEDURE — G8417 CALC BMI ABV UP PARAM F/U: HCPCS | Performed by: FAMILY MEDICINE

## 2022-09-15 PROCEDURE — 3044F HG A1C LEVEL LT 7.0%: CPT | Performed by: FAMILY MEDICINE

## 2022-09-15 PROCEDURE — 1123F ACP DISCUSS/DSCN MKR DOCD: CPT | Performed by: FAMILY MEDICINE

## 2022-09-15 PROCEDURE — G8753 SYS BP > OR = 140: HCPCS | Performed by: FAMILY MEDICINE

## 2022-09-15 PROCEDURE — 1101F PT FALLS ASSESS-DOCD LE1/YR: CPT | Performed by: FAMILY MEDICINE

## 2022-09-15 PROCEDURE — G8754 DIAS BP LESS 90: HCPCS | Performed by: FAMILY MEDICINE

## 2022-09-15 PROCEDURE — G8510 SCR DEP NEG, NO PLAN REQD: HCPCS | Performed by: FAMILY MEDICINE

## 2022-09-15 PROCEDURE — G8536 NO DOC ELDER MAL SCRN: HCPCS | Performed by: FAMILY MEDICINE

## 2022-09-15 PROCEDURE — 99215 OFFICE O/P EST HI 40 MIN: CPT | Performed by: FAMILY MEDICINE

## 2022-09-15 PROCEDURE — G8427 DOCREV CUR MEDS BY ELIG CLIN: HCPCS | Performed by: FAMILY MEDICINE

## 2022-09-15 RX ORDER — ENALAPRIL MALEATE AND HYDROCHLOROTHIAZIDE 5; 12.5 MG/1; MG/1
1 TABLET ORAL DAILY
Qty: 90 TABLET | Refills: 1 | Status: SHIPPED | OUTPATIENT
Start: 2022-09-15

## 2022-09-15 RX ORDER — AMLODIPINE BESYLATE 5 MG/1
5 TABLET ORAL DAILY
Qty: 90 TABLET | Refills: 1 | Status: SHIPPED | OUTPATIENT
Start: 2022-09-15 | End: 2022-10-27 | Stop reason: SDUPTHER

## 2022-09-15 RX ORDER — MELOXICAM 15 MG/1
15 TABLET ORAL DAILY
Qty: 30 TABLET | Refills: 1 | Status: SHIPPED | OUTPATIENT
Start: 2022-09-15

## 2022-09-15 RX ORDER — ROSUVASTATIN CALCIUM 5 MG/1
5 TABLET, COATED ORAL DAILY
Qty: 90 TABLET | Refills: 1 | Status: SHIPPED | OUTPATIENT
Start: 2022-09-15

## 2022-09-15 NOTE — PROGRESS NOTES
Chief Complaint   Patient presents with    Follow-up     1. Have you been to the ER, urgent care clinic since your last visit? Hospitalized since your last visit? No    2. Have you seen or consulted any other health care providers outside of the [address] [place] [address] since your last visit? Include any pap smears or colon screening.  No

## 2022-09-15 NOTE — PROGRESS NOTES
Danielle Zhang  [de-identified] y.o. female  1940  [id number]  [facility] MEDICINE  Progress Note     Encounter Date: 9/15/2022    Assessment and Plan:     Encounter Diagnoses     ICD-10-CM ICD-9-CM   1. Type 2 diabetes mellitus without complication, without long-term current use of insulin (HCC)  E11.9 250.00   2. Essential hypertension  I10 401.9   3. Hypercholesteremia  E78.00 272.0   4. Sciatica of right side  M54.31 724.3       1. Essential hypertension  Not at goal  At amlodipine  - enalapril-hydroCHLOROthiazide (VASERETIC) 5-12.5 mg tablet; Take 1 Tablet by mouth daily. Dispense: 90 Tablet; Refill: 1  - amLODIPine (NORVASC) 5 mg tablet; Take 1 Tablet by mouth daily. Dispense: 90 Tablet; Refill: 1    2. Hypercholesteremia  Continues statin  - rosuvastatin (CRESTOR) 5 mg tablet; Take 1 Tablet by mouth daily. Dispense: 90 Tablet; Refill: 1    3. Type 2 diabetes mellitus without complication, without long-term current use of insulin ([facility])  Check status  Diet discussed  - CBC WITH AUTOMATED DIFF; Future  - HEMOGLOBIN A1C WITH EAG; Future  - METABOLIC PANEL, BASIC; Future    4. Sciatica of right side  No better with PT. Start meloxicam  Xrays  To Dr. lE Childs for additional rx.  - meloxicam (MOBIC) 15 mg tablet; Take 1 Tablet by mouth daily. Dispense: 30 Tablet; Refill: 1  - XR SPINE LUMB 2 OR 3 V; Future  - REFERRAL TO ORTHOPEDICS    I have discussed the diagnosis with the patient and the intended plan as seen in the above orders. she has expressed understanding. The patient has received an after-visit summary and questions were answered concerning future plans. I have discussed medication side effects and warnings with the patient as well. Electronically Signed: Kyle Singh MD    Current Medications after this visit     Current Outpatient Medications   Medication Sig    enalapril-hydroCHLOROthiazide (VASERETIC) 5-12.5 mg tablet Take 1 Tablet by mouth daily.     rosuvastatin (CRESTOR) 5 mg tablet Take 1 Tablet by mouth daily. amLODIPine (NORVASC) 5 mg tablet Take 1 Tablet by mouth daily. meloxicam (MOBIC) 15 mg tablet Take 1 Tablet by mouth daily. acetaminophen (TYLENOL) 325 mg tablet Take 2 Tabs by mouth every six (6) hours as needed for Pain.    garlic 9,064 mg cap Take 1,000 mg by mouth daily. docosahexanoic acid/epa (FISH OIL PO) Take  by mouth. MULTIVITAMIN PO Take  by mouth.    calcium carbonate-vitamin d2 500 mg(1,250mg) -200 unit tab Take  by mouth. No current facility-administered medications for this visit. Medications Discontinued During This Encounter   Medication Reason    efinaconazole (JUBLIA) kali topical solution Not A Current Medication    rosuvastatin (CRESTOR) 5 mg tablet REORDER    enalapril-hydroCHLOROthiazide (VASERETIC) 5-12.5 mg tablet REORDER     ~~~~~~~~~~~~~~~~~~~~~~~~~~~~~~~~~~~~~~~~~~~~~~~~~~~~~~~~~~~    Chief Complaint   Patient presents with    Follow-up       History provided by patient  History of Present Illness   Manjinder Restrepo is a [de-identified] y.o. female who presents to clinic today for:  Marylee Marshall Dr. Delrae Manns a problem despite physical therapy  Had problems in past and better with physical therapy. No red flags:  fever or incontinence  Right side  Had xrays of hip only with Dr. Ute Jeffries per patient. S/p right THR and right TKR    Blood pressure   Up at home  171/85  163/85  140's at best    Hypercholesterolemia  On statin    DM2  Diet only  Trying to stick to diet. Avoids sugary drinks. Trying to avoid sweets and desserts  Watching starches.   Eyes are up to date (currently has a sty  Feet are OK>         Health Maintenance  Will do at future visit  Health Maintenance Due   Topic Date Due    Shingrix Vaccine Age 49> (2 of 2) 02/28/2019    COVID-19 Vaccine (3 - Booster for Climmie Messier series) 08/31/2021    Eye Exam Retinal or Dilated  09/23/2021    Flu Vaccine (1) 09/01/2022     Review of Systems   Review of Systems Respiratory:  Negative for shortness of breath. Cardiovascular:  Negative for chest pain. Musculoskeletal:  Positive for back pain. Neurological:  Positive for sensory change. Negative for focal weakness and weakness. Psychiatric/Behavioral: Negative. Vitals/Objective:     Vitals:    09/15/22 1011   BP: (!) 164/80   Pulse: 85   Resp: 17   Temp: 97.9 °F (36.6 °C)   TempSrc: Temporal   SpO2: 99%   Weight: 204 lb (92.5 kg)   Height: 5' 11\" (1.803 m)     Body mass index is 28.45 kg/m². Wt Readings from Last 3 Encounters:   09/15/22 204 lb (92.5 kg)   03/02/22 208 lb 9.6 oz (94.6 kg)   10/01/21 207 lb (93.9 kg)         Objective  Physical Exam  Vitals and nursing note reviewed. Constitutional:       Appearance: Normal appearance. She is not toxic-appearing. HENT:      Head: Normocephalic and atraumatic. Cardiovascular:      Rate and Rhythm: Normal rate and regular rhythm. Heart sounds: Normal heart sounds. No murmur heard. No gallop. Pulmonary:      Effort: Pulmonary effort is normal. No respiratory distress. Breath sounds: Normal breath sounds. No wheezing, rhonchi or rales. Musculoskeletal:      Cervical back: No muscular tenderness. Lymphadenopathy:      Cervical: No cervical adenopathy. Neurological:      Mental Status: She is alert. Psychiatric:         Mood and Affect: Mood normal.         Behavior: Behavior normal.         Thought Content: Thought content normal.         Judgment: Judgment normal.      Diabetic Foot Exam:  Protective sensation is intact bilaterally. Pedal pulses are 2+ and normal bilaterally.   L foot skin inspection:  normal skin and soft tissue with no gross edema or evidence of acute injury or foot ulcer  R foot skin inspection:  skin and soft tissue appear normal with no significant edema or evidence of acute injury or foot ulcer      BACK EXAM:   Inspection: stands in flexed posture with cane, concave to right  Points to buttock and right anterior thigh as site of severe radiating pain. Palpation: normal palpation of the bony posterior spinous processes, the sacral spine, iliac crest, posterior iliac spine and coccyx  without soft tissue abnormality; Neurovascular: deep tendon reflexes: 2/4 left patellar,  2/4 right patellar,  2/4 left Achilles,  2/4 right Achilles, Symmetric Reflexes without clonus;   Muscular Strength: 5/5 bilateral quadriceps;  5/5 bilateral tibialis anterior;  5/5 bilateral extensor hallucis longus;  5/5 bilateral extensor digitorum longus and brevis;  5/5 bilateral peroneus longus and brevis;  5/5 bilateral gastrocnemius-soleus;   Range of Motion: very difficult to exam with forward bending posture, pain with any sudden movement  Maneuvers:   (-) bilateral straight leg raise     Full Range of motion of both hips without pain Some pain with full abduction    No results found for this or any previous visit (from the past 24 hour(s)). Disposition     Follow-up and Dispositions    Return in about 6 months (around 3/15/2023) for Blood pressure follow up, Medication follow up, Diabetes follow up. No future appointments. History   Patient's past medical, surgical and family histories were reviewed and updated.     Past Medical History:   Diagnosis Date    Arthritis     KNEES     Cataract     Colonic polyp     Diverticulosis     Hemorrhoids     Large carpet -like rectal polyps  seen on coloscopy  2015    Hyperlipidemia     PT STATES NOT     Hypertension     Onychomycosis     Prediabetes     Sciatica     Transient global amnesia     h/o      Past Surgical History:   Procedure Laterality Date    HX CATARACT REMOVAL Right     HX COLONOSCOPY  2014 12/2014: ext hemorrhoids, diverticulosis, carpet-like polyp (resected by Dr. Shelby Guillaume 2/12/15) Repeat due 8/2018     HX COLONOSCOPY  02/2022    Normal, Dr. Shelby Guillaume, repeat 5 years    HX HIP REPLACEMENT Right     HX HIP REPLACEMENT Right     HX HYSTERECTOMY  1996    HX KNEE REPLACEMENT Right     HX KNEE REPLACEMENT Left 2019    HX VEIN STRIPPING      HX WISDOM TEETH EXTRACTION       Family History   Problem Relation Age of Onset    Hypertension Mother     Stroke Mother     Diabetes Mother     Hypertension Maternal Grandmother     Stroke Maternal Grandmother     Hypertension Sister     Hypertension Brother     Hypertension Brother     Hypertension Brother     Heart Disease Brother     Hypertension Brother     Diabetes Brother     Hypertension Brother     Diabetes Brother     Anesth Problems Neg Hx      Social History     Tobacco Use    Smoking status: Never    Smokeless tobacco: Never   Substance Use Topics    Alcohol use: No     Comment: RARELY     Drug use: No       Allergies     Allergies   Allergen Reactions    Penicillins Rash and Swelling     None noted

## 2022-09-16 LAB
ANION GAP SERPL CALC-SCNC: 5 MMOL/L (ref 5–15)
BASOPHILS # BLD: 0 K/UL (ref 0–0.1)
BASOPHILS NFR BLD: 1 % (ref 0–1)
BUN SERPL-MCNC: 14 MG/DL (ref 6–20)
BUN/CREAT SERPL: 19 (ref 12–20)
CALCIUM SERPL-MCNC: 9.5 MG/DL (ref 8.5–10.1)
CHLORIDE SERPL-SCNC: 106 MMOL/L (ref 97–108)
CO2 SERPL-SCNC: 29 MMOL/L (ref 21–32)
CREAT SERPL-MCNC: 0.75 MG/DL (ref 0.55–1.02)
DIFFERENTIAL METHOD BLD: ABNORMAL
EOSINOPHIL # BLD: 0.2 K/UL (ref 0–0.4)
EOSINOPHIL NFR BLD: 4 % (ref 0–7)
ERYTHROCYTE [DISTWIDTH] IN BLOOD BY AUTOMATED COUNT: 15.6 % (ref 11.5–14.5)
EST. AVERAGE GLUCOSE BLD GHB EST-MCNC: 143 MG/DL
GLUCOSE SERPL-MCNC: 101 MG/DL (ref 65–100)
HBA1C MFR BLD: 6.6 % (ref 4–5.6)
HCT VFR BLD AUTO: 47.3 % (ref 35–47)
HGB BLD-MCNC: 14.8 G/DL (ref 11.5–16)
IMM GRANULOCYTES # BLD AUTO: 0 K/UL (ref 0–0.04)
IMM GRANULOCYTES NFR BLD AUTO: 0 % (ref 0–0.5)
LYMPHOCYTES # BLD: 2.3 K/UL (ref 0.8–3.5)
LYMPHOCYTES NFR BLD: 41 % (ref 12–49)
MCH RBC QN AUTO: 26.1 PG (ref 26–34)
MCHC RBC AUTO-ENTMCNC: 31.3 G/DL (ref 30–36.5)
MCV RBC AUTO: 83.4 FL (ref 80–99)
MONOCYTES # BLD: 0.5 K/UL (ref 0–1)
MONOCYTES NFR BLD: 9 % (ref 5–13)
NEUTS SEG # BLD: 2.6 K/UL (ref 1.8–8)
NEUTS SEG NFR BLD: 45 % (ref 32–75)
NRBC # BLD: 0 K/UL (ref 0–0.01)
NRBC BLD-RTO: 0 PER 100 WBC
PLATELET # BLD AUTO: 290 K/UL (ref 150–400)
PMV BLD AUTO: 10.6 FL (ref 8.9–12.9)
POTASSIUM SERPL-SCNC: 5.3 MMOL/L (ref 3.5–5.1)
RBC # BLD AUTO: 5.67 M/UL (ref 3.8–5.2)
SODIUM SERPL-SCNC: 140 MMOL/L (ref 136–145)
WBC # BLD AUTO: 5.6 K/UL (ref 3.6–11)

## 2022-09-19 ENCOUNTER — HOSPITAL ENCOUNTER (OUTPATIENT)
Dept: GENERAL RADIOLOGY | Age: 82
Discharge: HOME | End: 2022-09-19
Payer: MEDICARE

## 2022-09-19 DIAGNOSIS — M54.31 SCIATICA OF RIGHT SIDE: ICD-10-CM

## 2022-09-19 PROCEDURE — 72100 X-RAY EXAM L-S SPINE 2/3 VWS: CPT

## 2022-09-19 NOTE — PROGRESS NOTES
Your blood work is fine. The diabetic control looks good. Stick to your diet and medications. See me in 6 months. We should recheck the diabetes and potassium at that time. Take the new medication and see me if the blood pressure is not consistently under 140/90.   [facility]

## 2022-09-20 NOTE — PROGRESS NOTES
Your xrays show arthritic changes in the back. There is evidence of old back injuries. Print a copy of the xray report to take with you when you see Dr. Leidy Sandoval.   [facility]

## 2022-10-27 ENCOUNTER — OFFICE VISIT (OUTPATIENT)
Dept: FAMILY MEDICINE CLINIC | Age: 82
End: 2022-10-27
Payer: MEDICARE

## 2022-10-27 VITALS
HEART RATE: 84 BPM | SYSTOLIC BLOOD PRESSURE: 137 MMHG | TEMPERATURE: 97.5 F | BODY MASS INDEX: 28.39 KG/M2 | RESPIRATION RATE: 17 BRPM | HEIGHT: 71 IN | OXYGEN SATURATION: 98 % | WEIGHT: 202.8 LBS | DIASTOLIC BLOOD PRESSURE: 72 MMHG

## 2022-10-27 DIAGNOSIS — I10 ESSENTIAL HYPERTENSION: Primary | ICD-10-CM

## 2022-10-27 DIAGNOSIS — I80.02 THROMBOPHLEBITIS OF SUPERFICIAL VEINS OF LEFT LOWER EXTREMITY: ICD-10-CM

## 2022-10-27 DIAGNOSIS — E11.9 TYPE 2 DIABETES MELLITUS WITHOUT COMPLICATION, WITHOUT LONG-TERM CURRENT USE OF INSULIN (HCC): ICD-10-CM

## 2022-10-27 PROCEDURE — G8417 CALC BMI ABV UP PARAM F/U: HCPCS | Performed by: FAMILY MEDICINE

## 2022-10-27 PROCEDURE — G8510 SCR DEP NEG, NO PLAN REQD: HCPCS | Performed by: FAMILY MEDICINE

## 2022-10-27 PROCEDURE — G8427 DOCREV CUR MEDS BY ELIG CLIN: HCPCS | Performed by: FAMILY MEDICINE

## 2022-10-27 PROCEDURE — 3074F SYST BP LT 130 MM HG: CPT | Performed by: FAMILY MEDICINE

## 2022-10-27 PROCEDURE — 99213 OFFICE O/P EST LOW 20 MIN: CPT | Performed by: FAMILY MEDICINE

## 2022-10-27 PROCEDURE — G8754 DIAS BP LESS 90: HCPCS | Performed by: FAMILY MEDICINE

## 2022-10-27 PROCEDURE — G8536 NO DOC ELDER MAL SCRN: HCPCS | Performed by: FAMILY MEDICINE

## 2022-10-27 PROCEDURE — 1123F ACP DISCUSS/DSCN MKR DOCD: CPT | Performed by: FAMILY MEDICINE

## 2022-10-27 PROCEDURE — 1090F PRES/ABSN URINE INCON ASSESS: CPT | Performed by: FAMILY MEDICINE

## 2022-10-27 PROCEDURE — 3078F DIAST BP <80 MM HG: CPT | Performed by: FAMILY MEDICINE

## 2022-10-27 PROCEDURE — 1101F PT FALLS ASSESS-DOCD LE1/YR: CPT | Performed by: FAMILY MEDICINE

## 2022-10-27 PROCEDURE — G8752 SYS BP LESS 140: HCPCS | Performed by: FAMILY MEDICINE

## 2022-10-27 PROCEDURE — G8400 PT W/DXA NO RESULTS DOC: HCPCS | Performed by: FAMILY MEDICINE

## 2022-10-27 PROCEDURE — 3044F HG A1C LEVEL LT 7.0%: CPT | Performed by: FAMILY MEDICINE

## 2022-10-27 RX ORDER — AMLODIPINE BESYLATE 5 MG/1
5 TABLET ORAL DAILY
Qty: 90 TABLET | Refills: 1 | Status: SHIPPED | OUTPATIENT
Start: 2022-10-27

## 2022-10-27 RX ORDER — ASPIRIN 81 MG/1
81 TABLET ORAL DAILY
COMMUNITY

## 2022-10-27 NOTE — PROGRESS NOTES
Karlo Weiss  [de-identified] y.o. female  1940  [id number]  [facility] FAMILY MEDICINE  Progress Note     Encounter Date: 10/27/2022    Assessment and Plan:     Encounter Diagnoses     ICD-10-CM ICD-9-CM   1. Essential hypertension  I10 401.9   2. Thrombophlebitis of superficial veins of left lower extremity  I80.02 451.0   3. Type 2 diabetes mellitus without complication, without long-term current use of insulin (HCC)  E11.9 250.00       1. Essential hypertension  Now at goal  Continue meds  FU 6 months    2. Thrombophlebitis of superficial veins of left lower extremity  Continue ASA  Voltaren cream  Heat    3. DM2  Continue diet   A1c 6.6    Continue back and pain workup with Dr. Rai Mcclendon    I have discussed the diagnosis with the patient and the intended plan as seen in the above orders. she has expressed understanding. The patient has received an after-visit summary and questions were answered concerning future plans. I have discussed medication side effects and warnings with the patient as well. Electronically Signed: Kalen Martin MD    Current Medications after this visit     Current Outpatient Medications   Medication Sig    aspirin delayed-release 81 mg tablet Take 81 mg by mouth daily. enalapril-hydroCHLOROthiazide (VASERETIC) 5-12.5 mg tablet Take 1 Tablet by mouth daily. rosuvastatin (CRESTOR) 5 mg tablet Take 1 Tablet by mouth daily. amLODIPine (NORVASC) 5 mg tablet Take 1 Tablet by mouth daily. meloxicam (MOBIC) 15 mg tablet Take 1 Tablet by mouth daily. acetaminophen (TYLENOL) 325 mg tablet Take 2 Tabs by mouth every six (6) hours as needed for Pain.    garlic 7,976 mg cap Take 1,000 mg by mouth daily. docosahexanoic acid/epa (FISH OIL PO) Take  by mouth. MULTIVITAMIN PO Take  by mouth.    calcium carbonate-vitamin d2 500 mg(1,250mg) -200 unit tab Take  by mouth. No current facility-administered medications for this visit.      There are no discontinued medications. ~~~~~~~~~~~~~~~~~~~~~~~~~~~~~~~~~~~~~~~~~~~~~~~~~~~~~~~~~~~    Chief Complaint   Patient presents with    Follow-up       History provided by patient  History of Present Illness   Alex Peñaloza is a [de-identified] y.o. female who presents to clinic today for:  Follow-up    Hypertension  At goal now  No problems with amlodipine  Takes meds consistently    Phlebitis left lower leg  Noted on exam  Cord on medial lower leg above ankle. Labs reviewed  Normal renal function  A1c 6.6    Health Maintenance  Will do at future visit  Health Maintenance Due   Topic Date Due    Shingrix Vaccine Age 49> (2 of 2) 02/28/2019    COVID-19 Vaccine (3 - Booster for Moderna series) 05/26/2021    Eye Exam Retinal or Dilated  09/23/2021    Flu Vaccine (1) 08/01/2022     Review of Systems   Review of Systems   Respiratory:  Negative for shortness of breath. Cardiovascular:  Negative for chest pain and leg swelling. Gastrointestinal: Negative. Genitourinary: Negative. Musculoskeletal:  Positive for back pain and joint pain. Psychiatric/Behavioral: Negative. Vitals/Objective:     Vitals:    10/27/22 1023   BP: 137/72   Pulse: 84   Resp: 17   Temp: 97.5 °F (36.4 °C)   TempSrc: Temporal   SpO2: 98%   Weight: 202 lb 12.8 oz (92 kg)   Height: 5' 11\" (1.803 m)     Body mass index is 28.28 kg/m². Wt Readings from Last 3 Encounters:   10/27/22 202 lb 12.8 oz (92 kg)   09/15/22 204 lb (92.5 kg)   03/02/22 208 lb 9.6 oz (94.6 kg)         Objective  Physical Exam  Vitals and nursing note reviewed. Constitutional:       Appearance: Normal appearance. She is not toxic-appearing. Comments: Walking with cane limping right side. HENT:      Head: Normocephalic and atraumatic. Cardiovascular:      Rate and Rhythm: Normal rate and regular rhythm. Heart sounds: Normal heart sounds. No murmur heard. No gallop. Pulmonary:      Effort: Pulmonary effort is normal. No respiratory distress.       Breath sounds: Normal breath sounds. No wheezing, rhonchi or rales. Musculoskeletal:      Cervical back: No muscular tenderness. Lymphadenopathy:      Cervical: No cervical adenopathy. Neurological:      Mental Status: She is alert. Psychiatric:         Mood and Affect: Mood normal.         Behavior: Behavior normal.         Thought Content: Thought content normal.         Judgment: Judgment normal.       No results found for this or any previous visit (from the past 24 hour(s)). Disposition     Follow-up and Dispositions    Return in about 6 months (around 4/27/2023) for Blood pressure follow up, Medication follow up. No future appointments. History   Patient's past medical, surgical and family histories were reviewed and updated.     Past Medical History:   Diagnosis Date    Arthritis     KNEES     Cataract     Colonic polyp     Diverticulosis     Hemorrhoids     Large carpet -like rectal polyps  seen on coloscopy  2015    Hyperlipidemia     PT STATES NOT     Hypertension     Onychomycosis     Prediabetes     Sciatica     Transient global amnesia     h/o      Past Surgical History:   Procedure Laterality Date    HX CATARACT REMOVAL Right     HX COLONOSCOPY  2014 12/2014: ext hemorrhoids, diverticulosis, carpet-like polyp (resected by Dr. Delphine Reynolds 2/12/15) Repeat due 8/2018     HX COLONOSCOPY  02/2022    Normal, Dr. Delphine Reynolds, repeat 5 years    HX HIP REPLACEMENT Right     HX HIP REPLACEMENT Right     HX HYSTERECTOMY  1996    HX KNEE REPLACEMENT Right     HX KNEE REPLACEMENT Left 2019    HX VEIN STRIPPING      HX WISDOM TEETH EXTRACTION       Family History   Problem Relation Age of Onset    Hypertension Mother     Stroke Mother     Diabetes Mother     Hypertension Maternal Grandmother     Stroke Maternal Grandmother     Hypertension Sister     Hypertension Brother     Hypertension Brother     Hypertension Brother     Heart Disease Brother     Hypertension Brother     Diabetes Brother     Hypertension Brother Diabetes Brother     Anesth Problems Neg Hx      Social History     Tobacco Use    Smoking status: Never    Smokeless tobacco: Never   Substance Use Topics    Alcohol use: No     Comment: RARELY     Drug use: No       Allergies     Allergies   Allergen Reactions    Penicillins Rash and Swelling     None noted

## 2022-10-27 NOTE — PATIENT INSTRUCTIONS
The goal blood pressure for most patients is 140/90. The whole point of treating blood pressure is to prevent strokes, heart attacks and kidney damage. Persistently elevated blood pressure damages blood vessels and can lead to catastrophic heart problems and strokes. Recommendations for Hypertension (elevated blood pressure):    Purchase a blood pressure cuff that goes around your upper arm and check blood pressure at least 3 times per week. Write down your numbers for review. Check blood pressure in the morning and evening. Rest for 5 minutes with feet elevated and arm resting on a table (at mid-chest level) when checking blood pressure    Exercise 30-60 minutes most days of the week, preferably with a mix of cardiovascular and strength training. Exercise can improve blood pressure, even if you do not lose weight! Limit alcohol intake to less than 3-4 drinks per week. Avoid tobacco products    Avoid illegal drugs especially amphetamines  DASH diet - includes fruits, vegetables, fiber, and less than 2000 mg sodium (salt) daily. Try to eat a low sugar diet. Sugar worsens diabetes and activates kidney hormones that raise blood pressure. Avoid non-steroidal inflammatory medications (NSAIDS) such as ibuprofen, advil, motrin, aleve, and naproxyn as these may increase blood pressure if used long-term    Avoid OTC decongestants such as pseudopherine, phenylephrine, Afrin  Take your blood pressure medicine (and aspirin if prescribed) religiously      USE VOLTAREN CREAM for phlebitis left leg.

## 2022-10-27 NOTE — PROGRESS NOTES
Chief Complaint   Patient presents with    Follow-up     1. Have you been to the ER, urgent care clinic since your last visit? Hospitalized since your last visit? No    2. Have you seen or consulted any other health care providers outside of the [address] [place] [address] since your last visit? Include any pap smears or colon screening.  No

## 2022-12-14 ENCOUNTER — VIRTUAL VISIT (OUTPATIENT)
Dept: FAMILY MEDICINE CLINIC | Age: 82
End: 2022-12-14
Payer: MEDICARE

## 2022-12-14 DIAGNOSIS — J06.9 UPPER RESPIRATORY TRACT INFECTION, UNSPECIFIED TYPE: Primary | ICD-10-CM

## 2022-12-14 DIAGNOSIS — R05.3 PERSISTENT COUGH: ICD-10-CM

## 2022-12-14 PROCEDURE — 99214 OFFICE O/P EST MOD 30 MIN: CPT | Performed by: NURSE PRACTITIONER

## 2022-12-14 PROCEDURE — 1123F ACP DISCUSS/DSCN MKR DOCD: CPT | Performed by: NURSE PRACTITIONER

## 2022-12-14 PROCEDURE — G8756 NO BP MEASURE DOC: HCPCS | Performed by: NURSE PRACTITIONER

## 2022-12-14 PROCEDURE — G8400 PT W/DXA NO RESULTS DOC: HCPCS | Performed by: NURSE PRACTITIONER

## 2022-12-14 PROCEDURE — G8432 DEP SCR NOT DOC, RNG: HCPCS | Performed by: NURSE PRACTITIONER

## 2022-12-14 PROCEDURE — 1101F PT FALLS ASSESS-DOCD LE1/YR: CPT | Performed by: NURSE PRACTITIONER

## 2022-12-14 PROCEDURE — G8427 DOCREV CUR MEDS BY ELIG CLIN: HCPCS | Performed by: NURSE PRACTITIONER

## 2022-12-14 PROCEDURE — 1090F PRES/ABSN URINE INCON ASSESS: CPT | Performed by: NURSE PRACTITIONER

## 2022-12-14 RX ORDER — CETIRIZINE HYDROCHLORIDE 10 MG/1
10 TABLET ORAL DAILY
Qty: 30 TABLET | Refills: 0 | Status: SHIPPED | OUTPATIENT
Start: 2022-12-14

## 2022-12-14 RX ORDER — AZITHROMYCIN 250 MG/1
TABLET, FILM COATED ORAL
Qty: 6 TABLET | Refills: 0 | Status: SHIPPED | OUTPATIENT
Start: 2022-12-14 | End: 2022-12-19

## 2022-12-14 RX ORDER — FLUTICASONE PROPIONATE 50 MCG
2 SPRAY, SUSPENSION (ML) NASAL DAILY
Qty: 1 EACH | Refills: 1 | Status: SHIPPED | OUTPATIENT
Start: 2022-12-14

## 2022-12-14 RX ORDER — PROMETHAZINE HYDROCHLORIDE AND DEXTROMETHORPHAN HYDROBROMIDE 6.25; 15 MG/5ML; MG/5ML
5 SYRUP ORAL
Qty: 118 ML | Refills: 0 | Status: SHIPPED | OUTPATIENT
Start: 2022-12-14 | End: 2022-12-21

## 2022-12-14 NOTE — PROGRESS NOTES
Karlo Weiss is a [de-identified] y.o. female who was seen by synchronous (real-time) audio-video technology on 12/14/2022 for No chief complaint on file. Assessment & Plan:   Diagnoses and all orders for this visit:    1. Upper respiratory tract infection, unspecified type  -     azithromycin (ZITHROMAX) 250 mg tablet; Take 2 tablets today, then take 1 tablet daily  - Unchanged, will start azithromycin today, side effects discussed. Over-the-counter symptom management discussed    2. Persistent cough  -     fluticasone propionate (FLONASE) 50 mcg/actuation nasal spray; 2 Sprays by Both Nostrils route daily. -     cetirizine (ZYRTEC) 10 mg tablet; Take 1 Tablet by mouth daily. -     promethazine-dextromethorphan (PROMETHAZINE-DM) 6.25-15 mg/5 mL syrup; Take 5 mL by mouth every four (4) hours as needed for Cough for up to 7 days. -     XR CHEST PA LAT; Future  - Will try course of Flonase and Zyrtec for postnasal drip. Promethazine DM cough syrup for nighttime coughing. Advised patient if cough persist past the antibiotic completion she is to go get a chest x-ray and make follow-up appointment. Seek urgent care for any chest pain or shortness of breath      I spent at least 10 minutes on this visit with this established patient. Subjective:   VV today for ongoing cough, coughing up sputum yellow  Started at least 3 weeks ago  Was taking nyquil  Denies fever, CP, SOB, palpitations or wheezing. Prior to Admission medications    Medication Sig Start Date End Date Taking? Authorizing Provider   azithromycin (ZITHROMAX) 250 mg tablet Take 2 tablets today, then take 1 tablet daily 12/14/22 12/19/22 Yes Dmitry Thacker, NP   fluticasone propionate (FLONASE) 50 mcg/actuation nasal spray 2 Sprays by Both Nostrils route daily. 12/14/22  Yes Dmitry Thacker, NP   cetirizine (ZYRTEC) 10 mg tablet Take 1 Tablet by mouth daily.  12/14/22  Yes Dmitry Thacker, NP   promethazine-dextromethorphan (PROMETHAZINE-DM) 6.25-15 mg/5 mL syrup Take 5 mL by mouth every four (4) hours as needed for Cough for up to 7 days. 12/14/22 12/21/22 Yes Billy Thacker NP   aspirin delayed-release 81 mg tablet Take 81 mg by mouth daily. Provider, Historical   amLODIPine (NORVASC) 5 mg tablet Take 1 Tablet by mouth daily. 10/27/22   Mikel Henderson MD   enalapril-hydroCHLOROthiazide (VASERETIC) 5-12.5 mg tablet Take 1 Tablet by mouth daily. 9/15/22   Mikel Henderson MD   rosuvastatin (CRESTOR) 5 mg tablet Take 1 Tablet by mouth daily. 9/15/22   Mikel Henderson MD   meloxicam (MOBIC) 15 mg tablet Take 1 Tablet by mouth daily. 9/15/22   Mikel Henderson MD   acetaminophen (TYLENOL) 325 mg tablet Take 2 Tabs by mouth every six (6) hours as needed for Pain. 12/1/20   Devante Bridges PA   garlic 3,546 mg cap Take 1,000 mg by mouth daily. Provider, Historical   docosahexanoic acid/epa (FISH OIL PO) Take  by mouth. Provider, Historical   MULTIVITAMIN PO Take  by mouth. Provider, Historical   calcium carbonate-vitamin d2 500 mg(1,250mg) -200 unit tab Take  by mouth. Provider, Historical     Patient Active Problem List   Diagnosis Code    Cataract H26.9    HTN (hypertension) I10    Type 2 diabetes mellitus, without long-term current use of insulin (HCC) E11.9    Hypercholesteremia E78.00    Cervical stenosis of spinal canal M48.02       Review of Systems   Constitutional:  Negative for chills, fever and malaise/fatigue. HENT:  Negative for congestion, ear pain and sinus pain. Eyes:  Negative for blurred vision. Respiratory:  Positive for cough and sputum production. Negative for shortness of breath and wheezing. Cardiovascular:  Negative for chest pain, palpitations and leg swelling. Neurological:  Negative for dizziness and headaches.      Objective:     Patient-Reported Vitals 12/14/2022   Patient-Reported Weight 200   Patient-Reported Height -   Patient-Reported Pulse 90   Patient-Reported Temperature 98.3 Patient-Reported Systolic  996   Patient-Reported Diastolic 79        [INSTRUCTIONS:  \"[x]\" Indicates a positive item  \"[]\" Indicates a negative item  -- DELETE ALL ITEMS NOT EXAMINED]    Constitutional: [x] Appears well-developed and well-nourished [x] No apparent distress      [] Abnormal -     Mental status: [x] Alert and awake  [x] Oriented to person/place/time [x] Able to follow commands    [] Abnormal -     Eyes:   EOM    [x]  Normal    [] Abnormal -   Sclera  [x]  Normal    [] Abnormal -          Discharge [x]  None visible   [] Abnormal -     HENT: [x] Normocephalic, atraumatic  [] Abnormal -   [x] Mouth/Throat: Mucous membranes are moist    External Ears [x] Normal  [] Abnormal -    Neck: [x] No visualized mass [] Abnormal -     Pulmonary/Chest: [x] Respiratory effort normal   [x] No visualized signs of difficulty breathing or respiratory distress        [] Abnormal -      Musculoskeletal:   [x] Normal gait with no signs of ataxia         [x] Normal range of motion of neck        [] Abnormal -     Neurological:        [x] No Facial Asymmetry (Cranial nerve 7 motor function) (limited exam due to video visit)          [x] No gaze palsy        [] Abnormal -          Skin:        [x] No significant exanthematous lesions or discoloration noted on facial skin         [] Abnormal -            Psychiatric:       [x] Normal Affect [] Abnormal -        [x] No Hallucinations    Other pertinent observable physical exam findings:-        We discussed the expected course, resolution and complications of the diagnosis(es) in detail. Medication risks, benefits, costs, interactions, and alternatives were discussed as indicated. I advised her to contact the office if her condition worsens, changes or fails to improve as anticipated. She expressed understanding with the diagnosis(es) and plan. Mai Oropeza, was evaluated through a synchronous (real-time) audio-video encounter.  The patient (or guardian if applicable) is aware that this is a billable service, which includes applicable co-pays. This Virtual Visit was conducted with patient's (and/or legal guardian's) consent. The visit was conducted pursuant to the emergency declaration under the [facility], [address] [place] [address] authority and the [employer] and [employer] General Act. Patient identification was verified, and a caregiver was present when appropriate. The patient was located at: Home: [address] [place] [address]  The provider was located at:  Facility (Appt Department): [address]

## 2023-03-15 ENCOUNTER — OFFICE VISIT (OUTPATIENT)
Dept: FAMILY MEDICINE CLINIC | Age: 83
End: 2023-03-15

## 2023-03-15 VITALS
RESPIRATION RATE: 16 BRPM | SYSTOLIC BLOOD PRESSURE: 127 MMHG | DIASTOLIC BLOOD PRESSURE: 79 MMHG | WEIGHT: 197.8 LBS | OXYGEN SATURATION: 98 % | BODY MASS INDEX: 27.69 KG/M2 | TEMPERATURE: 97.4 F | HEIGHT: 71 IN | HEART RATE: 78 BPM

## 2023-03-15 DIAGNOSIS — I10 ESSENTIAL HYPERTENSION: ICD-10-CM

## 2023-03-15 DIAGNOSIS — E11.9 TYPE 2 DIABETES MELLITUS WITHOUT COMPLICATION, WITHOUT LONG-TERM CURRENT USE OF INSULIN (HCC): ICD-10-CM

## 2023-03-15 DIAGNOSIS — E78.00 HYPERCHOLESTEREMIA: ICD-10-CM

## 2023-03-15 DIAGNOSIS — Z00.00 MEDICARE ANNUAL WELLNESS VISIT, SUBSEQUENT: Primary | ICD-10-CM

## 2023-03-15 RX ORDER — AMLODIPINE BESYLATE 5 MG/1
5 TABLET ORAL DAILY
Qty: 90 TABLET | Refills: 1 | Status: SHIPPED | OUTPATIENT
Start: 2023-03-15

## 2023-03-15 RX ORDER — ROSUVASTATIN CALCIUM 5 MG/1
TABLET, COATED ORAL
Qty: 90 TABLET | Refills: 1 | Status: SHIPPED | OUTPATIENT
Start: 2023-03-15

## 2023-03-15 RX ORDER — ENALAPRIL MALEATE AND HYDROCHLOROTHIAZIDE 5; 12.5 MG/1; MG/1
TABLET ORAL
Qty: 90 TABLET | Refills: 1 | Status: SHIPPED | OUTPATIENT
Start: 2023-03-15

## 2023-03-15 RX ORDER — ENALAPRIL MALEATE AND HYDROCHLOROTHIAZIDE 5; 12.5 MG/1; MG/1
1 TABLET ORAL DAILY
Qty: 90 TABLET | Refills: 1 | Status: SHIPPED | OUTPATIENT
Start: 2023-03-15 | End: 2023-03-15 | Stop reason: SDUPTHER

## 2023-03-15 RX ORDER — ROSUVASTATIN CALCIUM 5 MG/1
5 TABLET, COATED ORAL DAILY
Qty: 90 TABLET | Refills: 1 | Status: SHIPPED | OUTPATIENT
Start: 2023-03-15 | End: 2023-03-15 | Stop reason: SDUPTHER

## 2023-03-15 NOTE — PROGRESS NOTES
Identified pt with two pt identifiers(name and ). Reviewed record in preparation for visit and have obtained necessary documentation. Chief Complaint   Patient presents with    Annual Wellness Visit    Labs        Health Maintenance Due   Topic    Bone Densitometry (Dexa) Screening     Shingles Vaccine (2 of 2)    COVID-19 Vaccine (3 - Booster for Moderna series)    Eye Exam Retinal or Dilated     Flu Vaccine (1)    Foot Exam Q1     Lipid Screen     Medicare Yearly Exam        Coordination of Care Questionnaire:  :   1) Have you been to an emergency room, urgent care, or hospitalized since your last visit? If yes, where when, and reason for visit? no       2. Have seen or consulted any other health care provider since your last visit? If yes, where when, and reason for visit? NO        Patient is accompanied by self I have received verbal consent from Sylvester Ruvalcaba to discuss any/all medical information while they are present in the room.

## 2023-03-15 NOTE — PROGRESS NOTES
Geovani Lawrence  [de-identified] y.o. female  1940  [id number]  [facility] MEDICINE  Progress Note     Encounter Date: 3/15/2023    Assessment and Plan:     Encounter Diagnoses     ICD-10-CM ICD-9-CM   1. Medicare annual wellness visit, subsequent  Z00.00 V70.0   2. Type 2 diabetes mellitus without complication, without long-term current use of insulin (HCC)  E11.9 250.00   3. Essential hypertension  I10 401.9   4. Hypercholesteremia  E78.00 272.0       1. Medicare annual wellness visit, subsequent  Updated  All chronic meds refilled. 2. Type 2 diabetes mellitus without complication, without long-term current use of insulin (HCC)  Doing well on diet only  Weight down 5 more pounds  -  DIABETES FOOT EXAM  - HEMOGLOBIN A1C WITH EAG; Future  - CBC WITH AUTOMATED DIFF; Future    3. Essential hypertension  At goal on meds  - MICROALBUMIN, UR, RAND W/ MICROALB/CREAT RATIO; Future  - METABOLIC PANEL, BASIC; Future    4. Hypercholesteremia  On statin  - LIPID PANEL; Future  - HEPATIC FUNCTION PANEL; Future    I have discussed the diagnosis with the patient and the intended plan as seen in the above orders. she has expressed understanding. The patient has received an after-visit summary and questions were answered concerning future plans. I have discussed medication side effects and warnings with the patient as well. Electronically Signed: Wendy Reeder MD    Current Medications after this visit     Current Outpatient Medications   Medication Sig    aspirin delayed-release 81 mg tablet Take 81 mg by mouth daily. amLODIPine (NORVASC) 5 mg tablet Take 1 Tablet by mouth daily. enalapril-hydroCHLOROthiazide (VASERETIC) 5-12.5 mg tablet Take 1 Tablet by mouth daily. rosuvastatin (CRESTOR) 5 mg tablet Take 1 Tablet by mouth daily. acetaminophen (TYLENOL) 325 mg tablet Take 2 Tabs by mouth every six (6) hours as needed for Pain.    garlic 3,313 mg cap Take 1,000 mg by mouth daily. docosahexanoic acid/epa (FISH OIL PO) Take  by mouth. MULTIVITAMIN PO Take  by mouth.    calcium carbonate-vitamin d2 500 mg(1,250mg) -200 unit tab Take  by mouth. No current facility-administered medications for this visit. Medications Discontinued During This Encounter   Medication Reason    meloxicam (MOBIC) 15 mg tablet Not A Current Medication    fluticasone propionate (FLONASE) 50 mcg/actuation nasal spray Not A Current Medication    cetirizine (ZYRTEC) 10 mg tablet Not A Current Medication     ~~~~~~~~~~~~~~~~~~~~~~~~~~~~~~~~~~~~~~~~~~~~~~~~~~~~~~~~~~~    Chief Complaint   Patient presents with    Annual Wellness Visit    Labs       History provided by patient  History of Present Illness   Jean Carlisle is a [de-identified] y.o. female who presents to clinic today for:  Annual Wellness Visit and Labs    Hypertension  At goal  Takes meds consistently    Hypercholesterolemia  On statin. DM2  Last A1c 6.6 in September  Tries to stick to diet  No meds  Walking is a problem:  Back hurts and has to lean over. Back pain with walking  Back better with exercise  Previously dx with spinal stenosis and better with injections    Health Maintenance  Completed HM gaps at today's visit  Health Maintenance Due   Topic Date Due    Bone Densitometry (Dexa) Screening  Never done    Shingles Vaccine (2 of 2) 02/28/2019    COVID-19 Vaccine (3 - Booster for Moderna series) 05/26/2021    Eye Exam Retinal or Dilated  09/23/2021    Flu Vaccine (1) 08/01/2022    Lipid Screen  03/02/2023     Review of Systems   Review of Systems   Constitutional:  Positive for weight loss. Respiratory:  Negative for shortness of breath. Cardiovascular:  Negative for chest pain. Gastrointestinal:  Negative for blood in stool. Genitourinary:  Negative for hematuria. Psychiatric/Behavioral: Negative.         Vitals/Objective:     Vitals:    03/15/23 1342   BP: 127/79   Pulse: 78   Resp: 16   Temp: 97.4 °F (36.3 °C)   TempSrc: Temporal SpO2: 98%   Weight: 197 lb 12.8 oz (89.7 kg)   Height: 5' 11\" (1.803 m)     Body mass index is 27.59 kg/m². Wt Readings from Last 3 Encounters:   03/15/23 197 lb 12.8 oz (89.7 kg)   10/27/22 202 lb 12.8 oz (92 kg)   09/15/22 204 lb (92.5 kg)         Objective  Physical Exam  Vitals and nursing note reviewed. Constitutional:       Appearance: Normal appearance. She is not toxic-appearing. HENT:      Head: Normocephalic and atraumatic. Cardiovascular:      Rate and Rhythm: Normal rate and regular rhythm. Heart sounds: Normal heart sounds. No murmur heard. No gallop. Pulmonary:      Effort: Pulmonary effort is normal. No respiratory distress. Breath sounds: Normal breath sounds. No wheezing, rhonchi or rales. Musculoskeletal:      Cervical back: No muscular tenderness. Lymphadenopathy:      Cervical: No cervical adenopathy. Neurological:      Mental Status: She is alert. Psychiatric:         Mood and Affect: Mood normal.         Behavior: Behavior normal.         Thought Content: Thought content normal.         Judgment: Judgment normal.          Diabetic Foot Exam:  Protective sensation is intact bilaterally. Pedal pulses are 2+ and normal bilaterally. L foot skin inspection:  normal skin and soft tissue with no gross edema or evidence of acute injury or foot ulcer  R foot skin inspection:  skin and soft tissue appear normal with no significant edema or evidence of acute injury or foot ulcer      No results found for this or any previous visit (from the past 24 hour(s)). Disposition     Follow-up and Dispositions    Return in about 6 months (around 9/15/2023) for Blood pressure follow up, Medication follow up, Diabetes follow up. No future appointments. History   Patient's past medical, surgical and family histories were reviewed and updated.     Past Medical History:   Diagnosis Date    Arthritis     KNEES     Cataract     Colonic polyp     Diverticulosis     Hemorrhoids Large carpet -like rectal polyps  seen on coloscopy  2015    Hyperlipidemia     Hypertension     Onychomycosis     Prediabetes     Sciatica     Spinal stenosis of lumbar region     Transient global amnesia     h/o      Past Surgical History:   Procedure Laterality Date    HX CATARACT REMOVAL Right     HX COLONOSCOPY  2014 12/2014: ext hemorrhoids, diverticulosis, carpet-like polyp (resected by Dr. Zack Hand 2/12/15) Repeat due 8/2018     HX COLONOSCOPY  02/2022    Normal, Dr. Zack Hand, repeat 5 years    HX HIP REPLACEMENT Right     HX HIP REPLACEMENT Right     HX HYSTERECTOMY  1996    HX KNEE REPLACEMENT Right     HX KNEE REPLACEMENT Left 2019    HX VEIN STRIPPING      HX WISDOM TEETH EXTRACTION       Family History   Problem Relation Age of Onset    Hypertension Mother     Stroke Mother     Diabetes Mother     Hypertension Maternal Grandmother     Stroke Maternal Grandmother     Hypertension Sister     Hypertension Brother     Hypertension Brother     Hypertension Brother     Heart Disease Brother     Hypertension Brother     Diabetes Brother     Hypertension Brother     Diabetes Brother     Anesth Problems Neg Hx      Social History     Tobacco Use    Smoking status: Never    Smokeless tobacco: Never   Vaping Use    Vaping Use: Never used   Substance Use Topics    Alcohol use: No     Comment: RARELY     Drug use: No       Allergies     Allergies   Allergen Reactions    Penicillins Rash and Swelling     None noted                      This is the Subsequent Medicare Annual Wellness Exam, performed 12 months or more after the Initial AWV or the last Subsequent AWV    I have reviewed the patient's medical history in detail and updated the computerized patient record. Assessment/Plan   Education and counseling provided:  Are appropriate based on today's review and evaluation  End-of-Life planning (with patient's consent)    1. Medicare annual wellness visit, subsequent  2.  Type 2 diabetes mellitus without complication, without long-term current use of insulin (HCC)  -      DIABETES FOOT EXAM  -     HEMOGLOBIN A1C WITH EAG; Future  -     CBC WITH AUTOMATED DIFF; Future  3. Essential hypertension  -     MICROALBUMIN, UR, RAND W/ MICROALB/CREAT RATIO; Future  -     METABOLIC PANEL, BASIC; Future  4. Hypercholesteremia  -     LIPID PANEL; Future  -     HEPATIC FUNCTION PANEL; Future     Depression Risk Factor Screening     3 most recent PHQ Screens 3/15/2023   Little interest or pleasure in doing things Not at all   Feeling down, depressed, irritable, or hopeless Not at all   Total Score PHQ 2 0   Trouble falling or staying asleep, or sleeping too much Not at all   Feeling tired or having little energy Not at all   Poor appetite, weight loss, or overeating Not at all   Feeling bad about yourself - or that you are a failure or have let yourself or your family down Not at all   Trouble concentrating on things such as school, work, reading, or watching TV Not at all   Moving or speaking so slowly that other people could have noticed; or the opposite being so fidgety that others notice Not at all   Thoughts of being better off dead, or hurting yourself in some way Not at all   PHQ 9 Score 0   How difficult have these problems made it for you to do your work, take care of your home and get along with others Not difficult at all       Alcohol & Drug Abuse Risk Screen   Do you average more than 1 drink per night or more than 7 drinks a week?: (P) No  On any one occasion in the past three months have you had more than 3 drinks containing alcohol?: (P) No          Functional Ability and Level of Safety   Hearing:  Hearing: (P) Patient reports hearing is good    Activities of Daily Living: The home contains: (P) rugs  Functional ADLs: (P) Patient does total self care   Ambulation:  Patient ambulates: (P) with mild difficulty  How far the patient can walk with difficulty: (P) Maybe half block with cane.   Walking is difficult due to: (P) pain  Walking aids: (P) cane   Fall Risk:  Fall Risk Assessment, last 12 mths 3/15/2023   Able to walk? Yes   Fall in past 12 months? 0   Do you feel unsteady?  0   Are you worried about falling 0     Abuse Screen:  Do you ever feel afraid of your partner?: (P) No  Are you in a relationship with someone who physically or mentally threatens you?: (P) No  Is it safe for you to go home?: (P) Yes      Cognitive Screening   Has your family/caregiver stated any concerns about your memory?: (P) No   Cognitive Screening: Normal - interview    Health Maintenance Due     Health Maintenance Due   Topic Date Due    Bone Densitometry (Dexa) Screening  Never done    Shingles Vaccine (2 of 2) 02/28/2019    COVID-19 Vaccine (3 - Booster for Whitley Brinks series) 05/26/2021    Eye Exam Retinal or Dilated  09/23/2021    Flu Vaccine (1) 08/01/2022    Lipid Screen  03/02/2023       Patient Care Team   Patient Care Team:  Augustina Fam MD as PCP - General (Family Medicine)  Augustina Fam MD as PCP - [facility] Empaneled Provider  Naeem Ventura MD as Physician (Neurology)  Leslye Pham MD (Neurosurgery)    History     Patient Active Problem List   Diagnosis Code    Cataract H26.9    HTN (hypertension) I10    Type 2 diabetes mellitus, without long-term current use of insulin ([facility] [address] E11.9    Hypercholesteremia E78.00    Cervical stenosis of spinal canal M48.02     Past Medical History:   Diagnosis Date    Arthritis     KNEES     Cataract     Colonic polyp     Diverticulosis     Hemorrhoids     Large carpet -like rectal polyps  seen on coloscopy  2015    Hyperlipidemia     Hypertension     Onychomycosis     Prediabetes     Sciatica     Spinal stenosis of lumbar region     Transient global amnesia     h/o       Past Surgical History:   Procedure Laterality Date    HX CATARACT REMOVAL Right     HX COLONOSCOPY  2014 12/2014: ext hemorrhoids, diverticulosis, carpet-like polyp (resected by Dr. Armando Barreto 2/12/15) Repeat due 8/2018     HX COLONOSCOPY  02/2022    Normal, Dr. Batsheva Paz, repeat 5 years    HX HIP REPLACEMENT Right     HX HIP REPLACEMENT Right     HX HYSTERECTOMY  1996    HX KNEE REPLACEMENT Right     HX KNEE REPLACEMENT Left 2019    HX VEIN STRIPPING      HX WISDOM TEETH EXTRACTION       Current Outpatient Medications   Medication Sig Dispense Refill    aspirin delayed-release 81 mg tablet Take 81 mg by mouth daily. amLODIPine (NORVASC) 5 mg tablet Take 1 Tablet by mouth daily. 90 Tablet 1    enalapril-hydroCHLOROthiazide (VASERETIC) 5-12.5 mg tablet Take 1 Tablet by mouth daily. 90 Tablet 1    rosuvastatin (CRESTOR) 5 mg tablet Take 1 Tablet by mouth daily. 90 Tablet 1    acetaminophen (TYLENOL) 325 mg tablet Take 2 Tabs by mouth every six (6) hours as needed for Pain. 1 Tab 0    garlic 4,948 mg cap Take 1,000 mg by mouth daily. docosahexanoic acid/epa (FISH OIL PO) Take  by mouth. MULTIVITAMIN PO Take  by mouth.      calcium carbonate-vitamin d2 500 mg(1,250mg) -200 unit tab Take  by mouth.        Allergies   Allergen Reactions    Penicillins Rash and Swelling     None noted        Family History   Problem Relation Age of Onset    Hypertension Mother     Stroke Mother     Diabetes Mother     Hypertension Maternal Grandmother     Stroke Maternal Grandmother     Hypertension Sister     Hypertension Brother     Hypertension Brother     Hypertension Brother     Heart Disease Brother     Hypertension Brother     Diabetes Brother     Hypertension Brother     Diabetes Brother     Anesth Problems Neg Hx      Social History     Tobacco Use    Smoking status: Never    Smokeless tobacco: Never   Substance Use Topics    Alcohol use: No     Comment: Sera Mcdonald MD

## 2023-03-15 NOTE — PATIENT INSTRUCTIONS
Medicare Wellness Visit, Female     The best way to live healthy is to have a lifestyle where you eat a well-balanced diet, exercise regularly, limit alcohol use, and quit all forms of tobacco/nicotine, if applicable. Regular preventive services are another way to keep healthy. Preventive services (vaccines, screening tests, monitoring & exams) can help personalize your care plan, which helps you manage your own care. Screening tests can find health problems at the earliest stages, when they are easiest to treat. [facility] follows the current, evidence-based guidelines published by the [employer] ([facility]) when recommending preventive services for our patients. Because we follow these guidelines, sometimes recommendations change over time as research supports it. (For example, mammograms used to be recommended annually. Even though Medicare will still pay for an annual mammogram, the newer guidelines recommend a mammogram every two years for women of average risk). Of course, you and your doctor may decide to screen more often for some diseases, based on your risk and your co-morbidities (chronic disease you are already diagnosed with). Preventive services for you include:  - Medicare offers their members a free annual wellness visit, which is time for you and your primary care provider to discuss and plan for your preventive service needs.  Take advantage of this benefit every year!    -Over the age of 72 should receive the recommended pneumonia vaccines.    -All adults should have a flu vaccine yearly.  -All adults should have a tetanus vaccine every 10 years.   -Over the age 48 should receive the shingles vaccines.        -All adults should be screened once for Hepatitis C.  -All adults age 38-68 who are overweight should have a diabetes screening test once every three years.   -Other screening tests and preventive services for persons with diabetes include: an eye exam to screen for diabetic retinopathy, a kidney function test, a foot exam, and stricter control over your cholesterol.   -Cardiovascular screening for adults with routine risk involves an electrocardiogram (ECG) at intervals determined by your doctor.     -Colorectal cancer screenings should be done for adults age 39-70 with no increased risk factors for colorectal cancer. There are a number of acceptable methods of screening for this type of cancer. Each test has its own benefits and drawbacks. Discuss with your doctor what is most appropriate for you during your annual wellness visit. The different tests include: colonoscopy (considered the best screening method), a fecal occult blood test, a fecal DNA test, and sigmoidoscopy.    -Lung cancer screening is recommended annually with a low dose CT scan for adults between age 54 and 68, who have smoked at least 30 pack years (equivalent of 1 pack per day for 30 days), and who is a current smoker or quit less than 15 years ago.    -A bone mass density test is recommended when a woman turns 65 to screen for osteoporosis. This test is only recommended one time, as a screening. Some providers will use this same test as a disease monitoring tool if you already have osteoporosis. -Breast cancer screenings are recommended every other year for women of normal risk, age 54-69.    -Cervical cancer screenings for women over age 72 are only recommended with certain risk factors.      Here is a list of your current Health Maintenance items (your personalized list of preventive services) with a due date:  Health Maintenance Due   Topic Date Due    Bone Mineral Density   Never done    Shingles Vaccine (2 of 2) 02/28/2019    COVID-19 Vaccine (3 - Booster for Moderna series) 05/26/2021    Eye Exam  09/23/2021    Yearly Flu Vaccine (1) 08/01/2022    Diabetic Foot Care  03/02/2023    Cholesterol Test   03/02/2023         The goal blood pressure for most patients is 140/90. The whole point of treating blood pressure is to prevent strokes, heart attacks and kidney damage. Persistently elevated blood pressure damages blood vessels and can lead to catastrophic heart problems and strokes. Recommendations for Hypertension (elevated blood pressure):    Purchase a blood pressure cuff that goes around your upper arm and check blood pressure at least 3 times per week. Write down your numbers for review. Check blood pressure in the morning and evening. Rest for 5 minutes with feet elevated and arm resting on a table (at mid-chest level) when checking blood pressure    Exercise 30-60 minutes most days of the week, preferably with a mix of cardiovascular and strength training. Exercise can improve blood pressure, even if you do not lose weight! Limit alcohol intake to less than 3-4 drinks per week. Avoid tobacco products    Avoid illegal drugs especially amphetamines  DASH diet - includes fruits, vegetables, fiber, and less than 2000 mg sodium (salt) daily. Try to eat a low sugar diet. Sugar worsens diabetes and activates kidney hormones that raise blood pressure. Avoid non-steroidal inflammatory medications (NSAIDS) such as ibuprofen, advil, motrin, aleve, and naproxyn as these may increase blood pressure if used long-term    Avoid OTC decongestants such as pseudopherine, phenylephrine, Afrin  Take your blood pressure medicine (and aspirin if prescribed) religiously       Diabetes:  Blood sugar goals:  Hemoglobin A1c under 7  Fasting blood sugar   Blood sugar 2 hours after a meal under 180, 4 hours after a meal under 120  No hypoglycemia (sugars under 70 and symptomatic low sugars)    Blood sugar control with diet and exercise:  Exercise 45 minutes per day. This makes your insulin work better. It also allows insulin levels to fall helping with weight loss. Every night, try to fast from your evening meal to breakfast (at least 12 hours) without eating anything. This uses stored energy in your liver and makes insulin work better. Avoid simples sugars such as table sugar in drinks (sodas, lemonade, sweet tea, wine), desserts, candy. Also avoid fruit juices and high fructose corn syrup. Avoid frequent consumption of fruit especially grapes and bananas. A single serving of fruit daily is all you should have. Finally, drink less milk which has milk sugar in it. Control your starch intake. Men should have 3-4 carb portions per meal.  Women should have 2-3 carb portions per meal.  A carb serving is the equivalent of a slice of bread. Control your blood pressure and cholesterol. These problems are common in diabetes. AND, don't smoke. All of these problems contribute to heart disease and stroke risk. Get a yearly eye exam and flu shot. Make sure your vaccines are up to date particularly the pneumonia vaccines. Inspect your feet daily. Wear comfortable protective shoes and clean socks. Seek medical care if there are sore, calluses or numbness and burning of your feet. See your doctor at least every 6 months if you are on oral medicines or more often if the diabetic control is not at goal or if you are on insulin. Take your medicines religiously.

## 2023-03-16 LAB
ALBUMIN SERPL-MCNC: 4.2 G/DL (ref 3.5–5)
ALBUMIN/GLOB SERPL: 1.2 (ref 1.1–2.2)
ALP SERPL-CCNC: 74 U/L (ref 45–117)
ALT SERPL-CCNC: 37 U/L (ref 12–78)
ANION GAP SERPL CALC-SCNC: 3 MMOL/L (ref 5–15)
AST SERPL-CCNC: 36 U/L (ref 15–37)
BASOPHILS # BLD: 0.1 K/UL (ref 0–0.1)
BASOPHILS NFR BLD: 1 % (ref 0–1)
BILIRUB DIRECT SERPL-MCNC: 0.2 MG/DL (ref 0–0.2)
BILIRUB SERPL-MCNC: 0.5 MG/DL (ref 0.2–1)
BUN SERPL-MCNC: 11 MG/DL (ref 6–20)
BUN/CREAT SERPL: 18 (ref 12–20)
CALCIUM SERPL-MCNC: 10.1 MG/DL (ref 8.5–10.1)
CHLORIDE SERPL-SCNC: 104 MMOL/L (ref 97–108)
CHOLEST SERPL-MCNC: 151 MG/DL
CO2 SERPL-SCNC: 32 MMOL/L (ref 21–32)
CREAT SERPL-MCNC: 0.61 MG/DL (ref 0.55–1.02)
CREAT UR-MCNC: 77.6 MG/DL
DIFFERENTIAL METHOD BLD: ABNORMAL
EOSINOPHIL # BLD: 0.2 K/UL (ref 0–0.4)
EOSINOPHIL NFR BLD: 3 % (ref 0–7)
ERYTHROCYTE [DISTWIDTH] IN BLOOD BY AUTOMATED COUNT: 14.9 % (ref 11.5–14.5)
EST. AVERAGE GLUCOSE BLD GHB EST-MCNC: 137 MG/DL
GLOBULIN SER CALC-MCNC: 3.5 G/DL (ref 2–4)
GLUCOSE SERPL-MCNC: 94 MG/DL (ref 65–100)
HBA1C MFR BLD: 6.4 % (ref 4–5.6)
HCT VFR BLD AUTO: 48.5 % (ref 35–47)
HDLC SERPL-MCNC: 74 MG/DL
HDLC SERPL: 2 (ref 0–5)
HGB BLD-MCNC: 14.6 G/DL (ref 11.5–16)
IMM GRANULOCYTES # BLD AUTO: 0 K/UL (ref 0–0.04)
IMM GRANULOCYTES NFR BLD AUTO: 0 % (ref 0–0.5)
LDLC SERPL CALC-MCNC: 63.4 MG/DL (ref 0–100)
LYMPHOCYTES # BLD: 2.3 K/UL (ref 0.8–3.5)
LYMPHOCYTES NFR BLD: 42 % (ref 12–49)
MCH RBC QN AUTO: 25.4 PG (ref 26–34)
MCHC RBC AUTO-ENTMCNC: 30.1 G/DL (ref 30–36.5)
MCV RBC AUTO: 84.5 FL (ref 80–99)
MICROALBUMIN UR-MCNC: 0.52 MG/DL
MICROALBUMIN/CREAT UR-RTO: 7 MG/G (ref 0–30)
MONOCYTES # BLD: 0.4 K/UL (ref 0–1)
MONOCYTES NFR BLD: 8 % (ref 5–13)
NEUTS SEG # BLD: 2.6 K/UL (ref 1.8–8)
NEUTS SEG NFR BLD: 46 % (ref 32–75)
NRBC # BLD: 0 K/UL (ref 0–0.01)
NRBC BLD-RTO: 0 PER 100 WBC
PLATELET # BLD AUTO: 203 K/UL (ref 150–400)
PMV BLD AUTO: 12.3 FL (ref 8.9–12.9)
POTASSIUM SERPL-SCNC: 3.8 MMOL/L (ref 3.5–5.1)
PROT SERPL-MCNC: 7.7 G/DL (ref 6.4–8.2)
RBC # BLD AUTO: 5.74 M/UL (ref 3.8–5.2)
SODIUM SERPL-SCNC: 139 MMOL/L (ref 136–145)
TRIGL SERPL-MCNC: 68 MG/DL (ref ?–150)
VLDLC SERPL CALC-MCNC: 13.6 MG/DL
WBC # BLD AUTO: 5.6 K/UL (ref 3.6–11)

## 2023-03-16 NOTE — PROGRESS NOTES
Your blood work confirms that the blood sugar is well controlled. The cholesterol is also well controlled. Stick to your medications and see me in 6 months and as needed.   [facility]

## 2023-04-25 DIAGNOSIS — I10 ESSENTIAL HYPERTENSION: ICD-10-CM

## 2023-04-26 RX ORDER — AMLODIPINE BESYLATE 5 MG/1
TABLET ORAL
Qty: 90 TABLET | Refills: 1 | Status: SHIPPED | OUTPATIENT
Start: 2023-04-26

## 2023-05-20 RX ORDER — ROSUVASTATIN CALCIUM 5 MG/1
1 TABLET, COATED ORAL DAILY
COMMUNITY
Start: 2023-03-15

## 2023-05-20 RX ORDER — ACETAMINOPHEN 325 MG/1
650 TABLET ORAL EVERY 6 HOURS PRN
COMMUNITY
Start: 2020-12-01

## 2023-05-20 RX ORDER — AMLODIPINE BESYLATE 5 MG/1
1 TABLET ORAL DAILY
COMMUNITY
Start: 2023-04-26

## 2023-05-20 RX ORDER — ASPIRIN 81 MG/1
81 TABLET ORAL DAILY
COMMUNITY

## 2023-05-20 RX ORDER — ENALAPRIL MALEATE AND HYDROCHLOROTHIAZIDE 5; 12.5 MG/1; MG/1
1 TABLET ORAL DAILY
COMMUNITY
Start: 2023-03-15

## 2023-10-07 SDOH — ECONOMIC STABILITY: FOOD INSECURITY: WITHIN THE PAST 12 MONTHS, YOU WORRIED THAT YOUR FOOD WOULD RUN OUT BEFORE YOU GOT MONEY TO BUY MORE.: NEVER TRUE

## 2023-10-07 SDOH — ECONOMIC STABILITY: TRANSPORTATION INSECURITY
IN THE PAST 12 MONTHS, HAS LACK OF TRANSPORTATION KEPT YOU FROM MEETINGS, WORK, OR FROM GETTING THINGS NEEDED FOR DAILY LIVING?: NO

## 2023-10-07 SDOH — ECONOMIC STABILITY: FOOD INSECURITY: WITHIN THE PAST 12 MONTHS, THE FOOD YOU BOUGHT JUST DIDN'T LAST AND YOU DIDN'T HAVE MONEY TO GET MORE.: NEVER TRUE

## 2023-10-07 SDOH — ECONOMIC STABILITY: INCOME INSECURITY: HOW HARD IS IT FOR YOU TO PAY FOR THE VERY BASICS LIKE FOOD, HOUSING, MEDICAL CARE, AND HEATING?: NOT VERY HARD

## 2023-10-07 SDOH — ECONOMIC STABILITY: HOUSING INSECURITY
IN THE LAST 12 MONTHS, WAS THERE A TIME WHEN YOU DID NOT HAVE A STEADY PLACE TO SLEEP OR SLEPT IN A SHELTER (INCLUDING NOW)?: NO

## 2023-10-10 ENCOUNTER — OFFICE VISIT (OUTPATIENT)
Facility: CLINIC | Age: 83
End: 2023-10-10
Payer: MEDICARE

## 2023-10-10 ENCOUNTER — NURSE ONLY (OUTPATIENT)
Age: 83
End: 2023-10-10

## 2023-10-10 VITALS
DIASTOLIC BLOOD PRESSURE: 78 MMHG | HEIGHT: 67 IN | WEIGHT: 191 LBS | RESPIRATION RATE: 20 BRPM | TEMPERATURE: 97.4 F | OXYGEN SATURATION: 97 % | SYSTOLIC BLOOD PRESSURE: 130 MMHG | HEART RATE: 82 BPM | BODY MASS INDEX: 29.98 KG/M2

## 2023-10-10 DIAGNOSIS — I10 ESSENTIAL (PRIMARY) HYPERTENSION: Primary | ICD-10-CM

## 2023-10-10 DIAGNOSIS — E55.9 VITAMIN D DEFICIENCY: ICD-10-CM

## 2023-10-10 DIAGNOSIS — E78.00 PURE HYPERCHOLESTEROLEMIA, UNSPECIFIED: ICD-10-CM

## 2023-10-10 DIAGNOSIS — I10 ESSENTIAL (PRIMARY) HYPERTENSION: ICD-10-CM

## 2023-10-10 PROCEDURE — 1123F ACP DISCUSS/DSCN MKR DOCD: CPT | Performed by: NURSE PRACTITIONER

## 2023-10-10 PROCEDURE — 1090F PRES/ABSN URINE INCON ASSESS: CPT | Performed by: NURSE PRACTITIONER

## 2023-10-10 PROCEDURE — G8400 PT W/DXA NO RESULTS DOC: HCPCS | Performed by: NURSE PRACTITIONER

## 2023-10-10 PROCEDURE — G8484 FLU IMMUNIZE NO ADMIN: HCPCS | Performed by: NURSE PRACTITIONER

## 2023-10-10 PROCEDURE — 99214 OFFICE O/P EST MOD 30 MIN: CPT | Performed by: NURSE PRACTITIONER

## 2023-10-10 PROCEDURE — 4004F PT TOBACCO SCREEN RCVD TLK: CPT | Performed by: NURSE PRACTITIONER

## 2023-10-10 PROCEDURE — G8419 CALC BMI OUT NRM PARAM NOF/U: HCPCS | Performed by: NURSE PRACTITIONER

## 2023-10-10 PROCEDURE — 3078F DIAST BP <80 MM HG: CPT | Performed by: NURSE PRACTITIONER

## 2023-10-10 PROCEDURE — G8427 DOCREV CUR MEDS BY ELIG CLIN: HCPCS | Performed by: NURSE PRACTITIONER

## 2023-10-10 PROCEDURE — 3075F SYST BP GE 130 - 139MM HG: CPT | Performed by: NURSE PRACTITIONER

## 2023-10-10 RX ORDER — AMLODIPINE BESYLATE 5 MG/1
5 TABLET ORAL DAILY
Qty: 90 TABLET | Refills: 1 | Status: SHIPPED | OUTPATIENT
Start: 2023-10-10

## 2023-10-10 RX ORDER — ROSUVASTATIN CALCIUM 5 MG/1
5 TABLET, COATED ORAL DAILY
Qty: 90 TABLET | Refills: 1 | Status: SHIPPED | OUTPATIENT
Start: 2023-10-10

## 2023-10-10 RX ORDER — ERGOCALCIFEROL 1.25 MG/1
50000 CAPSULE ORAL DAILY
COMMUNITY

## 2023-10-10 RX ORDER — ENALAPRIL MALEATE 2.5 MG/1
2.5 TABLET ORAL DAILY
Qty: 90 TABLET | Refills: 1 | Status: SHIPPED | OUTPATIENT
Start: 2023-10-10

## 2023-10-10 RX ORDER — ENALAPRIL MALEATE 2.5 MG/1
1 TABLET ORAL DAILY
COMMUNITY
Start: 2018-09-27 | End: 2023-10-10 | Stop reason: SDUPTHER

## 2023-10-10 ASSESSMENT — ENCOUNTER SYMPTOMS
EYES NEGATIVE: 1
GASTROINTESTINAL NEGATIVE: 1
RESPIRATORY NEGATIVE: 1
ALLERGIC/IMMUNOLOGIC NEGATIVE: 1

## 2023-10-10 NOTE — PROGRESS NOTES
dentified pt with two pt identifiers(name and ). Chief Complaint   Patient presents with    Hypertension     6 months follow up Also, cholestrol        Health Maintenance Due   Topic    DEXA (modify frequency per FRAX score)     Hepatitis B vaccine (1 of 3 - Risk 3-dose series)    Shingles vaccine (2 of 2)    COVID-19 Vaccine (3 - Moderna series)    Flu vaccine (1)       Wt Readings from Last 3 Encounters:   03/15/23 197 lb 12.8 oz (89.7 kg)   10/27/22 202 lb 12.8 oz (92 kg)   09/15/22 204 lb (92.5 kg)     Temp Readings from Last 3 Encounters:   No data found for Temp     BP Readings from Last 3 Encounters:   03/15/23 127/79   10/27/22 137/72   09/15/22 (!) 164/80     Pulse Readings from Last 3 Encounters:   03/15/23 78   10/27/22 84   09/15/22 85           Coordination of Care Questionnaire:  :   1. \"Have you been to the ER, urgent care clinic since your last visit? Hospitalized since your last visit? \" no    2. \"Have you seen or consulted any other health care providers outside of the [address] [place] [address] since your last visit? \" no     3. For patients aged 43-73: Has the patient had a colonoscopy / FIT/ Cologuard? no      If the patient is female:    4. For patients aged 43-66: Has the patient had a mammogram within the past 2 years? no      5. For patients aged 21-65: Has the patient had a pap smear? no     3) Do you have an Advance Directive on file? no  Are you interested in receiving information about Advance Directives? no    Patient is accompanied by self I have received verbal consent from Zack Vargas to discuss any/all medical information while they are present in the room.

## 2023-10-11 LAB
25(OH)D3 SERPL-MCNC: 41.7 NG/ML (ref 30–100)
ALBUMIN SERPL-MCNC: 4.1 G/DL (ref 3.5–5)
ALBUMIN/GLOB SERPL: 1 (ref 1.1–2.2)
ALP SERPL-CCNC: 75 U/L (ref 45–117)
ALT SERPL-CCNC: 37 U/L (ref 12–78)
ANION GAP SERPL CALC-SCNC: 7 MMOL/L (ref 5–15)
AST SERPL-CCNC: 39 U/L (ref 15–37)
BILIRUB SERPL-MCNC: 0.7 MG/DL (ref 0.2–1)
BUN SERPL-MCNC: 11 MG/DL (ref 6–20)
BUN/CREAT SERPL: 15 (ref 12–20)
CALCIUM SERPL-MCNC: 10.3 MG/DL (ref 8.5–10.1)
CHLORIDE SERPL-SCNC: 103 MMOL/L (ref 97–108)
CHOLEST SERPL-MCNC: 167 MG/DL
CO2 SERPL-SCNC: 30 MMOL/L (ref 21–32)
CREAT SERPL-MCNC: 0.73 MG/DL (ref 0.55–1.02)
GLOBULIN SER CALC-MCNC: 4 G/DL (ref 2–4)
GLUCOSE SERPL-MCNC: 98 MG/DL (ref 65–100)
HDLC SERPL-MCNC: 76 MG/DL
HDLC SERPL: 2.2 (ref 0–5)
LDLC SERPL CALC-MCNC: 80.4 MG/DL (ref 0–100)
POTASSIUM SERPL-SCNC: 4.2 MMOL/L (ref 3.5–5.1)
PROT SERPL-MCNC: 8.1 G/DL (ref 6.4–8.2)
SODIUM SERPL-SCNC: 140 MMOL/L (ref 136–145)
TRIGL SERPL-MCNC: 53 MG/DL
VLDLC SERPL CALC-MCNC: 10.6 MG/DL

## 2023-10-11 RX ORDER — AMLODIPINE BESYLATE 5 MG/1
5 TABLET ORAL DAILY
Qty: 90 TABLET | Refills: 10 | OUTPATIENT
Start: 2023-10-11

## 2024-03-11 DIAGNOSIS — I10 ESSENTIAL (PRIMARY) HYPERTENSION: ICD-10-CM

## 2024-03-11 RX ORDER — ENALAPRIL MALEATE 2.5 MG/1
2.5 TABLET ORAL DAILY
Qty: 90 TABLET | Refills: 1 | Status: SHIPPED | OUTPATIENT
Start: 2024-03-11

## 2024-03-11 NOTE — TELEPHONE ENCOUNTER
PCP: Nathaniel Finley MD    Last appt: [unfilled]  Future Appointments   Date Time Provider Department Center   4/8/2024  9:00 AM Nathaniel Finley MD [facility] BS AMB       Requested Prescriptions     Pending Prescriptions Disp Refills    enalapril (VASOTEC) 2.5 MG tablet [Pharmacy Med Name: ENALAPRIL MALEATE 2.5 MG Tablet] 90 tablet 3     Sig: TAKE 1 TABLET EVERY DAY       Prior labs and Blood pressures:  BP Readings from Last 3 Encounters:   10/10/23 130/78   03/15/23 127/79   10/27/22 137/72     Lab Results   Component Value Date/Time     10/10/2023 09:17 AM    K 4.2 10/10/2023 09:17 AM     10/10/2023 09:17 AM    CO2 30 10/10/2023 09:17 AM    BUN 11 10/10/2023 09:17 AM    GFRAA >60 09/15/2022 11:31 AM     No results found for: \"HBA1C\", \"[id number]\"  Lab Results   Component Value Date/Time    CHOL 167 10/10/2023 09:17 AM    HDL 76 10/10/2023 09:17 AM     No results found for: \"VITD3\", \"VD3RIA\"    Lab Results   Component Value Date/Time    TSH 2.12 07/23/2020 10:24 AM

## 2024-03-20 DIAGNOSIS — E78.00 PURE HYPERCHOLESTEROLEMIA, UNSPECIFIED: ICD-10-CM

## 2024-03-20 NOTE — TELEPHONE ENCOUNTER
PCP: Nathaniel Finley MD    Last appt: [unfilled]  Future Appointments   Date Time Provider Department Center   4/8/2024  9:00 AM Nathaniel Finley MD [facility] BS AMB       Requested Prescriptions     Pending Prescriptions Disp Refills    rosuvastatin (CRESTOR) 5 MG tablet [Pharmacy Med Name: ROSUVASTATIN CALCIUM 5 MG Tablet] 90 tablet 3     Sig: TAKE 1 TABLET EVERY DAY       Prior labs and Blood pressures:  BP Readings from Last 3 Encounters:   10/10/23 130/78   03/15/23 127/79   10/27/22 137/72     Lab Results   Component Value Date/Time     10/10/2023 09:17 AM    K 4.2 10/10/2023 09:17 AM     10/10/2023 09:17 AM    CO2 30 10/10/2023 09:17 AM    BUN 11 10/10/2023 09:17 AM    GFRAA >60 09/15/2022 11:31 AM     No results found for: \"HBA1C\", \"RAV6ITCE\"  Lab Results   Component Value Date/Time    CHOL 167 10/10/2023 09:17 AM    HDL 76 10/10/2023 09:17 AM     No results found for: \"VITD3\", \"VD3RIA\"    Lab Results   Component Value Date/Time    TSH 2.12 07/23/2020 10:24 AM

## 2024-03-21 RX ORDER — ROSUVASTATIN CALCIUM 5 MG/1
5 TABLET, COATED ORAL DAILY
Qty: 90 TABLET | Refills: 1 | Status: SHIPPED | OUTPATIENT
Start: 2024-03-21

## 2024-04-08 ENCOUNTER — OFFICE VISIT (OUTPATIENT)
Facility: CLINIC | Age: 84
End: 2024-04-08
Payer: MEDICARE

## 2024-04-08 VITALS
TEMPERATURE: 97.9 F | HEART RATE: 80 BPM | BODY MASS INDEX: 27.38 KG/M2 | OXYGEN SATURATION: 98 % | HEIGHT: 71 IN | SYSTOLIC BLOOD PRESSURE: 146 MMHG | WEIGHT: 195.6 LBS | DIASTOLIC BLOOD PRESSURE: 76 MMHG | RESPIRATION RATE: 18 BRPM

## 2024-04-08 DIAGNOSIS — E11.9 TYPE 2 DIABETES MELLITUS WITHOUT COMPLICATION, WITHOUT LONG-TERM CURRENT USE OF INSULIN (HCC): ICD-10-CM

## 2024-04-08 DIAGNOSIS — I10 ESSENTIAL (PRIMARY) HYPERTENSION: Primary | ICD-10-CM

## 2024-04-08 DIAGNOSIS — E78.00 PURE HYPERCHOLESTEROLEMIA, UNSPECIFIED: ICD-10-CM

## 2024-04-08 DIAGNOSIS — I10 ESSENTIAL (PRIMARY) HYPERTENSION: ICD-10-CM

## 2024-04-08 LAB
ALBUMIN SERPL-MCNC: 3.9 G/DL (ref 3.5–5)
ALBUMIN/GLOB SERPL: 1 (ref 1.1–2.2)
ALP SERPL-CCNC: 77 U/L (ref 45–117)
ALT SERPL-CCNC: 34 U/L (ref 12–78)
ANION GAP SERPL CALC-SCNC: 2 MMOL/L (ref 5–15)
AST SERPL-CCNC: 29 U/L (ref 15–37)
BASOPHILS # BLD: 0 K/UL (ref 0–0.1)
BASOPHILS NFR BLD: 1 % (ref 0–1)
BILIRUB DIRECT SERPL-MCNC: 0.2 MG/DL (ref 0–0.2)
BILIRUB SERPL-MCNC: 0.5 MG/DL (ref 0.2–1)
BUN SERPL-MCNC: 16 MG/DL (ref 6–20)
BUN/CREAT SERPL: 20 (ref 12–20)
CALCIUM SERPL-MCNC: 9.8 MG/DL (ref 8.5–10.1)
CHLORIDE SERPL-SCNC: 107 MMOL/L (ref 97–108)
CHOLEST SERPL-MCNC: 203 MG/DL
CO2 SERPL-SCNC: 30 MMOL/L (ref 21–32)
COMMENT:: NORMAL
CREAT SERPL-MCNC: 0.79 MG/DL (ref 0.55–1.02)
CREAT UR-MCNC: 76.7 MG/DL
DIFFERENTIAL METHOD BLD: ABNORMAL
EOSINOPHIL # BLD: 0.1 K/UL (ref 0–0.4)
EOSINOPHIL NFR BLD: 3 % (ref 0–7)
ERYTHROCYTE [DISTWIDTH] IN BLOOD BY AUTOMATED COUNT: 14.8 % (ref 11.5–14.5)
EST. AVERAGE GLUCOSE BLD GHB EST-MCNC: 134 MG/DL
GLOBULIN SER CALC-MCNC: 4 G/DL (ref 2–4)
GLUCOSE SERPL-MCNC: 104 MG/DL (ref 65–100)
HBA1C MFR BLD: 6.3 % (ref 4–5.6)
HCT VFR BLD AUTO: 47.7 % (ref 35–47)
HDLC SERPL-MCNC: 93 MG/DL
HDLC SERPL: 2.2 (ref 0–5)
HGB BLD-MCNC: 14.5 G/DL (ref 11.5–16)
IMM GRANULOCYTES # BLD AUTO: 0 K/UL (ref 0–0.04)
IMM GRANULOCYTES NFR BLD AUTO: 0 % (ref 0–0.5)
LDLC SERPL CALC-MCNC: 98.4 MG/DL (ref 0–100)
LYMPHOCYTES # BLD: 2.2 K/UL (ref 0.8–3.5)
LYMPHOCYTES NFR BLD: 41 % (ref 12–49)
MCH RBC QN AUTO: 25.5 PG (ref 26–34)
MCHC RBC AUTO-ENTMCNC: 30.4 G/DL (ref 30–36.5)
MCV RBC AUTO: 84 FL (ref 80–99)
MICROALBUMIN UR-MCNC: <0.5 MG/DL
MICROALBUMIN/CREAT UR-RTO: <7 MG/G (ref 0–30)
MONOCYTES # BLD: 0.4 K/UL (ref 0–1)
MONOCYTES NFR BLD: 7 % (ref 5–13)
NEUTS SEG # BLD: 2.5 K/UL (ref 1.8–8)
NEUTS SEG NFR BLD: 48 % (ref 32–75)
NRBC # BLD: 0 K/UL (ref 0–0.01)
NRBC BLD-RTO: 0 PER 100 WBC
PLATELET # BLD AUTO: 134 K/UL (ref 150–400)
POTASSIUM SERPL-SCNC: 4.7 MMOL/L (ref 3.5–5.1)
PROT SERPL-MCNC: 7.9 G/DL (ref 6.4–8.2)
RBC # BLD AUTO: 5.68 M/UL (ref 3.8–5.2)
SODIUM SERPL-SCNC: 139 MMOL/L (ref 136–145)
SPECIMEN HOLD: NORMAL
TRIGL SERPL-MCNC: 58 MG/DL
VLDLC SERPL CALC-MCNC: 11.6 MG/DL
WBC # BLD AUTO: 5.3 K/UL (ref 3.6–11)

## 2024-04-08 PROCEDURE — G8419 CALC BMI OUT NRM PARAM NOF/U: HCPCS | Performed by: FAMILY MEDICINE

## 2024-04-08 PROCEDURE — 1090F PRES/ABSN URINE INCON ASSESS: CPT | Performed by: FAMILY MEDICINE

## 2024-04-08 PROCEDURE — 99214 OFFICE O/P EST MOD 30 MIN: CPT | Performed by: FAMILY MEDICINE

## 2024-04-08 PROCEDURE — 1123F ACP DISCUSS/DSCN MKR DOCD: CPT | Performed by: FAMILY MEDICINE

## 2024-04-08 PROCEDURE — 3077F SYST BP >= 140 MM HG: CPT | Performed by: FAMILY MEDICINE

## 2024-04-08 PROCEDURE — G8427 DOCREV CUR MEDS BY ELIG CLIN: HCPCS | Performed by: FAMILY MEDICINE

## 2024-04-08 PROCEDURE — G8400 PT W/DXA NO RESULTS DOC: HCPCS | Performed by: FAMILY MEDICINE

## 2024-04-08 PROCEDURE — 3078F DIAST BP <80 MM HG: CPT | Performed by: FAMILY MEDICINE

## 2024-04-08 PROCEDURE — 1036F TOBACCO NON-USER: CPT | Performed by: FAMILY MEDICINE

## 2024-04-08 RX ORDER — AMLODIPINE BESYLATE 5 MG/1
5 TABLET ORAL DAILY
Qty: 90 TABLET | Refills: 1 | Status: SHIPPED | OUTPATIENT
Start: 2024-04-08

## 2024-04-08 RX ORDER — ENALAPRIL MALEATE AND HYDROCHLOROTHIAZIDE 5; 12.5 MG/1; MG/1
1 TABLET ORAL DAILY
Qty: 90 TABLET | Refills: 1 | Status: SHIPPED | OUTPATIENT
Start: 2024-04-08

## 2024-04-08 ASSESSMENT — PATIENT HEALTH QUESTIONNAIRE - PHQ9
SUM OF ALL RESPONSES TO PHQ QUESTIONS 1-9: 0
2. FEELING DOWN, DEPRESSED OR HOPELESS: NOT AT ALL
1. LITTLE INTEREST OR PLEASURE IN DOING THINGS: NOT AT ALL
SUM OF ALL RESPONSES TO PHQ9 QUESTIONS 1 & 2: 0
SUM OF ALL RESPONSES TO PHQ QUESTIONS 1-9: 0

## 2024-04-08 ASSESSMENT — ENCOUNTER SYMPTOMS: SHORTNESS OF BREATH: 0

## 2024-04-08 NOTE — PATIENT INSTRUCTIONS
The goal blood pressure for most patients is 140/90.    The whole point of treating blood pressure is to prevent strokes, heart attacks and kidney damage.  Persistently elevated blood pressure damages blood vessels and can lead to catastrophic heart problems and strokes.    Recommendations for Hypertension (elevated blood pressure):    Purchase a blood pressure cuff that goes around your upper arm and check blood pressure at least 3 times per week. Write down your numbers for review. Check blood pressure in the morning and evening. Rest for 5 minutes with feet elevated and arm resting on a table (at mid-chest level) when checking blood pressure    Exercise 30-60 minutes most days of the week, preferably with a mix of cardiovascular and strength training. Exercise can improve blood pressure, even if you do not lose weight!    Limit alcohol intake to less than 3-4 drinks per week.   Avoid tobacco products    Avoid illegal drugs especially amphetamines  DASH diet - includes fruits, vegetables, fiber, and less than 2000 mg sodium (salt) daily.    Try to eat a low sugar diet. Sugar worsens diabetes and activates kidney hormones that raise blood pressure.   Avoid non-steroidal inflammatory medications (NSAIDS) such as ibuprofen, advil, motrin, aleve, and naproxyn as these may increase blood pressure if used long-term    Avoid OTC decongestants such as pseudopherine, phenylephrine, Afrin.  Take your blood pressure medicine (and aspirin if prescribed) religiously.       Diabetes:  Blood sugar goals:  Hemoglobin A1c under 7  Fasting blood sugar   Blood sugar 2 hours after a meal under 180, 4 hours after a meal under 120  No hypoglycemia (sugars under 70 and symptomatic low sugars)    Blood sugar control with diet and exercise:  Exercise 45 minutes per day.  This makes your insulin work better.  It also allows insulin levels to fall helping with weight loss.  Every night, try to fast from your evening meal to breakfast

## 2024-04-08 NOTE — PROGRESS NOTES
Noemy Merida  83 y.o. female  1940  MRN:[id number]  [facility]  Progress Note     Encounter Date: 4/8/2024    Assessment and Plan:       ICD-10-CM    1. Essential (primary) hypertension  I10 amLODIPine (NORVASC) 5 MG tablet     enalapril maleate-HCTZ 5-12.5 MG TABS     Basic Metabolic Panel     Microalbumin / Creatinine Urine Ratio      2. Type 2 diabetes mellitus without complication, without long-term current use of insulin (HCC)  E11.9 CBC with Auto Differential      DIABETES FOOT EXAM     Hemoglobin A1C      3. Pure hypercholesterolemia, unspecified  E78.00 Hepatic Function Panel     Lipid Panel        1. Essential (primary) hypertension  Not at goal today but did not have meds this AM  FU If not at goal  -     amLODIPine (NORVASC) 5 MG tablet; Take 1 tablet by mouth daily, Disp-90 tablet, R-1Normal  -     enalapril maleate-HCTZ 5-12.5 MG TABS; Take 1 tablet by mouth daily, Disp-90 tablet, R-1Normal  -     Basic Metabolic Panel; Future  -     Microalbumin / Creatinine Urine Ratio; Future  2. Type 2 diabetes mellitus without complication, without long-term current use of insulin (HCC)  Diet only-diet and exercise discussed.  -     CBC with Auto Differential; Future  -      DIABETES FOOT EXAM  -     Hemoglobin A1C; Future  3. Pure hypercholesterolemia, unspecified  Continue statin  -     Hepatic Function Panel; Future  -     Lipid Panel; Future       I have discussed the diagnosis with the patient and the intended plan as seen in the above orders.  she has expressed understanding.  The patient has received an after-visit summary and questions were answered concerning future plans.  I have discussed medication side effects and warnings with the patient as well.    Electronically Signed: Nathaniel Finley MD    Current Medications after this visit     Current Outpatient Medications   Medication Sig    amLODIPine (NORVASC) 5 MG tablet Take 1 tablet by mouth daily    enalapril

## 2024-04-08 NOTE — PROGRESS NOTES
\"Have you been to the ER, urgent care clinic since your last visit?  Hospitalized since your last visit?\"    NO    “Have you seen or consulted any other health care providers outside of [facility] since your last visit?”    NO            Click Here for Release of Records Request

## 2024-08-13 ENCOUNTER — OFFICE VISIT (OUTPATIENT)
Facility: CLINIC | Age: 84
End: 2024-08-13
Payer: MEDICARE

## 2024-08-13 VITALS
SYSTOLIC BLOOD PRESSURE: 132 MMHG | OXYGEN SATURATION: 98 % | HEIGHT: 71 IN | BODY MASS INDEX: 28 KG/M2 | RESPIRATION RATE: 16 BRPM | WEIGHT: 200 LBS | DIASTOLIC BLOOD PRESSURE: 77 MMHG | HEART RATE: 95 BPM | TEMPERATURE: 98.4 F

## 2024-08-13 DIAGNOSIS — L98.9 SKIN LESION OF BACK: Primary | ICD-10-CM

## 2024-08-13 PROCEDURE — 1036F TOBACCO NON-USER: CPT | Performed by: PHYSICIAN ASSISTANT

## 2024-08-13 PROCEDURE — G8419 CALC BMI OUT NRM PARAM NOF/U: HCPCS | Performed by: PHYSICIAN ASSISTANT

## 2024-08-13 PROCEDURE — 1090F PRES/ABSN URINE INCON ASSESS: CPT | Performed by: PHYSICIAN ASSISTANT

## 2024-08-13 PROCEDURE — 3078F DIAST BP <80 MM HG: CPT | Performed by: PHYSICIAN ASSISTANT

## 2024-08-13 PROCEDURE — 3075F SYST BP GE 130 - 139MM HG: CPT | Performed by: PHYSICIAN ASSISTANT

## 2024-08-13 PROCEDURE — G8400 PT W/DXA NO RESULTS DOC: HCPCS | Performed by: PHYSICIAN ASSISTANT

## 2024-08-13 PROCEDURE — G8427 DOCREV CUR MEDS BY ELIG CLIN: HCPCS | Performed by: PHYSICIAN ASSISTANT

## 2024-08-13 PROCEDURE — 1123F ACP DISCUSS/DSCN MKR DOCD: CPT | Performed by: PHYSICIAN ASSISTANT

## 2024-08-13 PROCEDURE — 99213 OFFICE O/P EST LOW 20 MIN: CPT | Performed by: PHYSICIAN ASSISTANT

## 2024-08-13 ASSESSMENT — ENCOUNTER SYMPTOMS
VOMITING: 0
SHORTNESS OF BREATH: 0
RHINORRHEA: 0
COUGH: 0
DIARRHEA: 0
SORE THROAT: 0
NAUSEA: 0

## 2024-08-13 ASSESSMENT — PATIENT HEALTH QUESTIONNAIRE - PHQ9
1. LITTLE INTEREST OR PLEASURE IN DOING THINGS: NOT AT ALL
SUM OF ALL RESPONSES TO PHQ QUESTIONS 1-9: 0
SUM OF ALL RESPONSES TO PHQ9 QUESTIONS 1 & 2: 0

## 2024-08-13 NOTE — PROGRESS NOTES
History of present illness:Noemy Merida is a 84 y.o. female presenting for Skin Problem (Noticed the mole Saturday morning./)    Ms. Merida is here for skin lesion on the back, she noticed on Friday.    Patient states that she was in shower and she felt itchy on the back, she tried to scratch it.  It was not painful.  But later she noticed that her gown was bleeding.  Then she got concerned and came to get check about that bleeding lesion.  She is not sure how long that lesion is there as it was on the back.    Patient denies any other concerns or skin cancers in the past.    Review of Systems   Constitutional:  Negative for chills, fatigue and fever.   HENT:  Negative for congestion, ear pain, rhinorrhea and sore throat.    Eyes:  Negative for visual disturbance.   Respiratory:  Negative for cough and shortness of breath.    Cardiovascular:  Negative for chest pain and palpitations.   Gastrointestinal:  Negative for diarrhea, nausea and vomiting.   Genitourinary:  Negative for dysuria, flank pain, frequency and urgency.   Musculoskeletal:  Negative for arthralgias and joint swelling.   Skin:  Positive for rash.   Neurological:  Negative for dizziness, weakness and headaches.   Psychiatric/Behavioral:  Negative for behavioral problems and confusion.      Objective  Physical Exam  Vitals reviewed.   Constitutional:       General: She is not in acute distress.     Appearance: Normal appearance.   HENT:      Head: Normocephalic and atraumatic.      Right Ear: External ear normal.      Left Ear: External ear normal.      Nose: Nose normal.   Eyes:      Extraocular Movements: Extraocular movements intact.      Conjunctiva/sclera: Conjunctivae normal.   Cardiovascular:      Rate and Rhythm: Normal rate and regular rhythm.      Pulses: Normal pulses.      Heart sounds: Normal heart sounds.   Pulmonary:      Effort: Pulmonary effort is normal.      Breath sounds: Normal breath sounds.   Abdominal:      General:

## 2024-08-13 NOTE — PROGRESS NOTES
1. Have you been to the ER, urgent care clinic since your last visit?  Hospitalized since your last visit?No    2. Have you seen or consulted any other health care providers outside of the [facility] since your last visit?  Include any pap smears or colon screening. No    Chief Complaint   Patient presents with    Skin Problem     Noticed the mole Saturday morning.       Health Maintenance Due   Topic Date Due    DEXA (modify frequency per FRAX score)  Never done    Respiratory Syncytial Virus (RSV) Pregnant or age 60 yrs+ (1 - 1-dose 60+ series) Never done    Shingles vaccine (2 of 2) 02/28/2019    COVID-19 Vaccine (3 - 2023-24 season) 09/01/2023    Annual Wellness Visit (Medicare Advantage)  Never done    Flu vaccine (1) 08/01/2024     PHQ-9 Total Score: 0 (8/13/2024  1:00 PM)        8/13/2024     1:00 PM   AMB Abuse Screening   Do you ever feel afraid of your partner? N   Are you in a relationship with someone who physically or mentally threatens you? N   Is it safe for you to go home? Y         8/13/2024     1:00 PM   Amb Fall Risk Assessment and TUG Test   Do you feel unsteady or are you worried about falling?  no   2 or more falls in past year? no   Fall with injury in past year? no     Vitals:    08/13/24 1300   BP: 132/77   Pulse: 95   Resp: 16   Temp: 98.4 °F (36.9 °C)   SpO2: 98%

## 2024-08-14 DIAGNOSIS — E78.00 PURE HYPERCHOLESTEROLEMIA, UNSPECIFIED: ICD-10-CM

## 2024-08-15 RX ORDER — ROSUVASTATIN CALCIUM 5 MG/1
5 TABLET, COATED ORAL DAILY
Qty: 90 TABLET | Refills: 1 | Status: SHIPPED | OUTPATIENT
Start: 2024-08-15

## 2024-09-02 DIAGNOSIS — I10 ESSENTIAL (PRIMARY) HYPERTENSION: ICD-10-CM

## 2024-09-03 NOTE — TELEPHONE ENCOUNTER
PCP: Nathaniel Finley MD    Last appt: [unfilled]  Future Appointments   Date Time Provider Department Center   10/14/2024 10:00 AM Nathaniel Finley MD [facility] DEP       Requested Prescriptions     Pending Prescriptions Disp Refills    enalapril maleate-HCTZ 5-12.5 MG TABS [Pharmacy Med Name: Enalapril-hydroCHLOROthiazide Oral Tablet 5-12.5 MG] 90 tablet 3     Sig: TAKE 1 TABLET EVERY DAY       Prior labs and Blood pressures:  BP Readings from Last 3 Encounters:   08/13/24 132/77   04/08/24 (!) 146/76   10/10/23 130/78     Lab Results   Component Value Date/Time     04/08/2024 10:47 AM    K 4.7 04/08/2024 10:47 AM     04/08/2024 10:47 AM    CO2 30 04/08/2024 10:47 AM    BUN 16 04/08/2024 10:47 AM    GFRAA >60 09/15/2022 11:31 AM     No results found for: \"HBA1C\", \"[id number]\"  Lab Results   Component Value Date/Time    CHOL 203 04/08/2024 10:47 AM    HDL 93 04/08/2024 10:47 AM    LDL 98.4 04/08/2024 10:47 AM    VLDL 11.6 04/08/2024 10:47 AM     No results found for: \"VITD3\"    Lab Results   Component Value Date/Time    TSH 2.12 07/23/2020 10:24 AM

## 2024-09-05 RX ORDER — ENALAPRIL MALEATE AND HYDROCHLOROTHIAZIDE 5; 12.5 MG/1; MG/1
1 TABLET ORAL DAILY
Qty: 90 TABLET | Refills: 0 | Status: SHIPPED | OUTPATIENT
Start: 2024-09-05

## 2024-09-18 DIAGNOSIS — I10 ESSENTIAL (PRIMARY) HYPERTENSION: ICD-10-CM

## 2024-09-18 RX ORDER — AMLODIPINE BESYLATE 5 MG/1
5 TABLET ORAL DAILY
Qty: 90 TABLET | Refills: 1 | Status: SHIPPED | OUTPATIENT
Start: 2024-09-18

## 2024-10-11 SDOH — ECONOMIC STABILITY: FOOD INSECURITY: WITHIN THE PAST 12 MONTHS, YOU WORRIED THAT YOUR FOOD WOULD RUN OUT BEFORE YOU GOT MONEY TO BUY MORE.: NEVER TRUE

## 2024-10-11 SDOH — ECONOMIC STABILITY: FOOD INSECURITY: WITHIN THE PAST 12 MONTHS, THE FOOD YOU BOUGHT JUST DIDN'T LAST AND YOU DIDN'T HAVE MONEY TO GET MORE.: NEVER TRUE

## 2024-10-11 SDOH — HEALTH STABILITY: PHYSICAL HEALTH: ON AVERAGE, HOW MANY MINUTES DO YOU ENGAGE IN EXERCISE AT THIS LEVEL?: 0 MIN

## 2024-10-11 SDOH — ECONOMIC STABILITY: INCOME INSECURITY: HOW HARD IS IT FOR YOU TO PAY FOR THE VERY BASICS LIKE FOOD, HOUSING, MEDICAL CARE, AND HEATING?: NOT HARD AT ALL

## 2024-10-11 SDOH — HEALTH STABILITY: PHYSICAL HEALTH: ON AVERAGE, HOW MANY DAYS PER WEEK DO YOU ENGAGE IN MODERATE TO STRENUOUS EXERCISE (LIKE A BRISK WALK)?: 0 DAYS

## 2024-10-11 ASSESSMENT — PATIENT HEALTH QUESTIONNAIRE - PHQ9
SUM OF ALL RESPONSES TO PHQ9 QUESTIONS 1 & 2: 0
2. FEELING DOWN, DEPRESSED OR HOPELESS: NOT AT ALL
SUM OF ALL RESPONSES TO PHQ QUESTIONS 1-9: 0
SUM OF ALL RESPONSES TO PHQ QUESTIONS 1-9: 0
1. LITTLE INTEREST OR PLEASURE IN DOING THINGS: NOT AT ALL
SUM OF ALL RESPONSES TO PHQ QUESTIONS 1-9: 0
SUM OF ALL RESPONSES TO PHQ QUESTIONS 1-9: 0

## 2024-10-11 ASSESSMENT — LIFESTYLE VARIABLES
HOW OFTEN DO YOU HAVE SIX OR MORE DRINKS ON ONE OCCASION: 1
HOW MANY STANDARD DRINKS CONTAINING ALCOHOL DO YOU HAVE ON A TYPICAL DAY: 1 OR 2
HOW OFTEN DO YOU HAVE A DRINK CONTAINING ALCOHOL: 2
HOW MANY STANDARD DRINKS CONTAINING ALCOHOL DO YOU HAVE ON A TYPICAL DAY: 1
HOW OFTEN DO YOU HAVE A DRINK CONTAINING ALCOHOL: MONTHLY OR LESS

## 2024-10-14 ENCOUNTER — OFFICE VISIT (OUTPATIENT)
Facility: CLINIC | Age: 84
End: 2024-10-14
Payer: MEDICARE

## 2024-10-14 VITALS
HEIGHT: 71 IN | TEMPERATURE: 97.2 F | DIASTOLIC BLOOD PRESSURE: 84 MMHG | BODY MASS INDEX: 27.77 KG/M2 | WEIGHT: 198.4 LBS | HEART RATE: 95 BPM | SYSTOLIC BLOOD PRESSURE: 134 MMHG | OXYGEN SATURATION: 97 % | RESPIRATION RATE: 17 BRPM

## 2024-10-14 DIAGNOSIS — I10 ESSENTIAL (PRIMARY) HYPERTENSION: ICD-10-CM

## 2024-10-14 DIAGNOSIS — E11.9 TYPE 2 DIABETES MELLITUS WITHOUT COMPLICATION, WITHOUT LONG-TERM CURRENT USE OF INSULIN (HCC): ICD-10-CM

## 2024-10-14 DIAGNOSIS — E55.9 VITAMIN D DEFICIENCY: ICD-10-CM

## 2024-10-14 DIAGNOSIS — E78.00 PURE HYPERCHOLESTEROLEMIA, UNSPECIFIED: ICD-10-CM

## 2024-10-14 DIAGNOSIS — Z00.00 MEDICARE ANNUAL WELLNESS VISIT, SUBSEQUENT: Primary | ICD-10-CM

## 2024-10-14 DIAGNOSIS — D69.6 THROMBOCYTOPENIA, UNSPECIFIED (HCC): ICD-10-CM

## 2024-10-14 LAB
25(OH)D3 SERPL-MCNC: 43.3 NG/ML (ref 30–100)
ALBUMIN SERPL-MCNC: 4 G/DL (ref 3.5–5)
ALBUMIN/GLOB SERPL: 1 (ref 1.1–2.2)
ALP SERPL-CCNC: 79 U/L (ref 45–117)
ALT SERPL-CCNC: 32 U/L (ref 12–78)
ANION GAP SERPL CALC-SCNC: 5 MMOL/L (ref 2–12)
AST SERPL-CCNC: 25 U/L (ref 15–37)
BASOPHILS # BLD: 0.1 K/UL (ref 0–0.1)
BASOPHILS NFR BLD: 1 % (ref 0–1)
BILIRUB DIRECT SERPL-MCNC: 0.2 MG/DL (ref 0–0.2)
BILIRUB SERPL-MCNC: 0.6 MG/DL (ref 0.2–1)
BUN SERPL-MCNC: 17 MG/DL (ref 6–20)
BUN/CREAT SERPL: 22 (ref 12–20)
CALCIUM SERPL-MCNC: 9.6 MG/DL (ref 8.5–10.1)
CHLORIDE SERPL-SCNC: 103 MMOL/L (ref 97–108)
CHOLEST SERPL-MCNC: 185 MG/DL
CO2 SERPL-SCNC: 31 MMOL/L (ref 21–32)
CREAT SERPL-MCNC: 0.78 MG/DL (ref 0.55–1.02)
DIFFERENTIAL METHOD BLD: ABNORMAL
EOSINOPHIL # BLD: 0.2 K/UL (ref 0–0.4)
EOSINOPHIL NFR BLD: 3 % (ref 0–7)
ERYTHROCYTE [DISTWIDTH] IN BLOOD BY AUTOMATED COUNT: 15.2 % (ref 11.5–14.5)
EST. AVERAGE GLUCOSE BLD GHB EST-MCNC: 140 MG/DL
GLOBULIN SER CALC-MCNC: 4.1 G/DL (ref 2–4)
GLUCOSE SERPL-MCNC: 100 MG/DL (ref 65–100)
HBA1C MFR BLD: 6.5 % (ref 4–5.6)
HCT VFR BLD AUTO: 46.6 % (ref 35–47)
HDLC SERPL-MCNC: 86 MG/DL
HDLC SERPL: 2.2 (ref 0–5)
HGB BLD-MCNC: 14.6 G/DL (ref 11.5–16)
IMM GRANULOCYTES # BLD AUTO: 0 K/UL
IMM GRANULOCYTES NFR BLD AUTO: 0 %
LDLC SERPL CALC-MCNC: 88.4 MG/DL (ref 0–100)
LYMPHOCYTES # BLD: 2.8 K/UL (ref 0.8–3.5)
LYMPHOCYTES NFR BLD: 45 % (ref 12–49)
MCH RBC QN AUTO: 25.8 PG (ref 26–34)
MCHC RBC AUTO-ENTMCNC: 31.3 G/DL (ref 30–36.5)
MCV RBC AUTO: 82.5 FL (ref 80–99)
MONOCYTES # BLD: 0.3 K/UL (ref 0–1)
MONOCYTES NFR BLD: 5 % (ref 5–13)
NEUTS BAND NFR BLD MANUAL: 1 % (ref 0–6)
NEUTS SEG # BLD: 2.8 K/UL (ref 1.8–8)
NEUTS SEG NFR BLD: 45 % (ref 32–75)
NRBC # BLD: 0 K/UL (ref 0–0.01)
NRBC BLD-RTO: 0 PER 100 WBC
PLATELET # BLD AUTO: 319 K/UL (ref 150–400)
PMV BLD AUTO: 10.4 FL (ref 8.9–12.9)
POTASSIUM SERPL-SCNC: 4.3 MMOL/L (ref 3.5–5.1)
PROT SERPL-MCNC: 8.1 G/DL (ref 6.4–8.2)
RBC # BLD AUTO: 5.65 M/UL (ref 3.8–5.2)
RBC MORPH BLD: ABNORMAL
SODIUM SERPL-SCNC: 139 MMOL/L (ref 136–145)
TRIGL SERPL-MCNC: 53 MG/DL
VLDLC SERPL CALC-MCNC: 10.6 MG/DL
WBC # BLD AUTO: 6.2 K/UL (ref 3.6–11)
WBC MORPH BLD: ABNORMAL

## 2024-10-14 PROCEDURE — 1123F ACP DISCUSS/DSCN MKR DOCD: CPT | Performed by: FAMILY MEDICINE

## 2024-10-14 PROCEDURE — 3079F DIAST BP 80-89 MM HG: CPT | Performed by: FAMILY MEDICINE

## 2024-10-14 PROCEDURE — G8427 DOCREV CUR MEDS BY ELIG CLIN: HCPCS | Performed by: FAMILY MEDICINE

## 2024-10-14 PROCEDURE — 99214 OFFICE O/P EST MOD 30 MIN: CPT | Performed by: FAMILY MEDICINE

## 2024-10-14 PROCEDURE — G8419 CALC BMI OUT NRM PARAM NOF/U: HCPCS | Performed by: FAMILY MEDICINE

## 2024-10-14 PROCEDURE — 3044F HG A1C LEVEL LT 7.0%: CPT | Performed by: FAMILY MEDICINE

## 2024-10-14 PROCEDURE — 1036F TOBACCO NON-USER: CPT | Performed by: FAMILY MEDICINE

## 2024-10-14 PROCEDURE — G8400 PT W/DXA NO RESULTS DOC: HCPCS | Performed by: FAMILY MEDICINE

## 2024-10-14 PROCEDURE — G0439 PPPS, SUBSEQ VISIT: HCPCS | Performed by: FAMILY MEDICINE

## 2024-10-14 PROCEDURE — 3075F SYST BP GE 130 - 139MM HG: CPT | Performed by: FAMILY MEDICINE

## 2024-10-14 PROCEDURE — 1090F PRES/ABSN URINE INCON ASSESS: CPT | Performed by: FAMILY MEDICINE

## 2024-10-14 PROCEDURE — G8484 FLU IMMUNIZE NO ADMIN: HCPCS | Performed by: FAMILY MEDICINE

## 2024-10-14 RX ORDER — ENALAPRIL MALEATE AND HYDROCHLOROTHIAZIDE 5; 12.5 MG/1; MG/1
1 TABLET ORAL DAILY
Qty: 90 TABLET | Refills: 1 | Status: SHIPPED | OUTPATIENT
Start: 2024-10-14

## 2024-10-14 RX ORDER — AMLODIPINE BESYLATE 5 MG/1
5 TABLET ORAL DAILY
Qty: 90 TABLET | Refills: 1 | Status: SHIPPED | OUTPATIENT
Start: 2024-10-14

## 2024-10-14 RX ORDER — ROSUVASTATIN CALCIUM 5 MG/1
5 TABLET, COATED ORAL DAILY
Qty: 90 TABLET | Refills: 1 | Status: SHIPPED | OUTPATIENT
Start: 2024-10-14

## 2024-10-14 ASSESSMENT — ENCOUNTER SYMPTOMS
ABDOMINAL PAIN: 0
SHORTNESS OF BREATH: 0
BLOOD IN STOOL: 0

## 2024-10-14 NOTE — PATIENT INSTRUCTIONS
Learning About Being Active as an Older Adult  Why is being active important as you get older?     Being active is one of the best things you can do for your health. And it's never too late to start. Being active--or getting active, if you aren't already--has definite benefits. It can:  Give you more energy,  Keep your mind sharp.  Improve balance to reduce your risk of falls.  Help you manage chronic illness with fewer medicines.  No matter how old you are, how fit you are, or what health problems you have, there is a form of activity that will work for you. And the more physical activity you can do, the better your overall health will be.  What kinds of activity can help you stay healthy?  Being more active will make your daily activities easier. Physical activity includes planned exercise and things you do in daily life. There are four types of activity:  Aerobic.  Doing aerobic activity makes your heart and lungs strong.  Includes walking, dancing, and gardening.  Aim for at least 2½ hours spread throughout the week.  It improves your energy and can help you sleep better.  Muscle-strengthening.  This type of activity can help maintain muscle and strengthen bones.  Includes climbing stairs, using resistance bands, and lifting or carrying heavy loads.  Aim for at least twice a week.  It can help protect the knees and other joints.  Stretching.  Stretching gives you better range of motion in joints and muscles.  Includes upper arm stretches, calf stretches, and gentle yoga.  Aim for at least twice a week, preferably after your muscles are warmed up from other activities.  It can help you function better in daily life.  Balancing.  This helps you stay coordinated and have good posture.  Includes heel-to-toe walking, silvino chi, and certain types of yoga.  Aim for at least 3 days a week.  It can reduce your risk of falling.  Even if you have a hard time meeting the recommendations, it's better to be more active

## 2024-10-14 NOTE — PROGRESS NOTES
Identified patient with two patient identifiers (name and ). Reviewed chart in preparation for visit and have obtained necessary documentation.    Noemy Merida is a 84 y.o. female  Chief Complaint   Patient presents with    Annual Exam    Medicare AWV     BP (!) 151/81 (Site: Left Upper Arm, Position: Sitting, Cuff Size: Large Adult)   Pulse 95   Temp 97.2 °F (36.2 °C) (Tympanic)   Resp 17   Ht 1.803 m (5' 11\")   Wt 90 kg (198 lb 6.4 oz)   SpO2 97%   BMI 27.67 kg/m²     1. Have you been to the ER, urgent care clinic since your last visit?  Hospitalized since your last visit?no    2. Have you seen or consulted any other health care providers outside of the [facility] since your last visit?  Include any pap smears or colon screening. no   
ETOH.  Non smoker  No sleep apnea  Takes meds consistently.    Cholesterol  On statin    DM2  Sticking to diet  Diet only.  Last A1c in prediabetic range.    Health Maintenance  Completed HM gaps at today's visit  Health Maintenance Due   Topic Date Due    DEXA (modify frequency per FRAX score)  Never done    Respiratory Syncytial Virus (RSV) Pregnant or age 60 yrs+ (1 - 1-dose 60+ series) Never done    Shingles vaccine (2 of 2) 02/28/2019    Flu vaccine (1) 08/01/2024    COVID-19 Vaccine (3 - 2023-24 season) 09/01/2024     Review of Systems   Review of Systems   Respiratory:  Negative for shortness of breath.    Cardiovascular:  Negative for chest pain.   Gastrointestinal:  Negative for abdominal pain and blood in stool.   Genitourinary:  Negative for hematuria.   Psychiatric/Behavioral: Negative.            Vitals/Objective:     Vitals:    10/14/24 1017 10/14/24 1109   BP: (!) 151/81 134/84   Site: Left Upper Arm    Position: Sitting    Cuff Size: Large Adult    Pulse: 95    Resp: 17    Temp: 97.2 °F (36.2 °C)    TempSrc: Tympanic    SpO2: 97%    Weight: 90 kg (198 lb 6.4 oz)    Height: 1.803 m (5' 11\")      Body mass index is 27.67 kg/m².    Wt Readings from Last 3 Encounters:   10/14/24 90 kg (198 lb 6.4 oz)   08/13/24 90.7 kg (200 lb)   04/08/24 88.7 kg (195 lb 9.6 oz)         Objective  Physical Exam  Constitutional:       Appearance: Normal appearance.   HENT:      Head: Normocephalic and atraumatic.   Cardiovascular:      Rate and Rhythm: Normal rate and regular rhythm.      Heart sounds: Normal heart sounds. No murmur heard.     No gallop.   Pulmonary:      Effort: Pulmonary effort is normal. No respiratory distress.      Breath sounds: Normal breath sounds. No wheezing, rhonchi or rales.   Lymphadenopathy:      Cervical: No cervical adenopathy.   Neurological:      Mental Status: She is alert and oriented to person, place, and time.   Psychiatric:         Mood and Affect: Mood normal.         Behavior:

## 2025-03-17 DIAGNOSIS — E78.00 PURE HYPERCHOLESTEROLEMIA, UNSPECIFIED: ICD-10-CM

## 2025-03-17 RX ORDER — ROSUVASTATIN CALCIUM 5 MG/1
5 TABLET, COATED ORAL DAILY
Qty: 90 TABLET | Refills: 1 | Status: SHIPPED | OUTPATIENT
Start: 2025-03-17

## 2025-04-09 DIAGNOSIS — I10 ESSENTIAL (PRIMARY) HYPERTENSION: ICD-10-CM

## 2025-04-09 RX ORDER — ENALAPRIL MALEATE AND HYDROCHLOROTHIAZIDE 5; 12.5 MG/1; MG/1
1 TABLET ORAL DAILY
Qty: 90 TABLET | Refills: 1 | Status: SHIPPED | OUTPATIENT
Start: 2025-04-09

## 2025-04-12 SDOH — ECONOMIC STABILITY: FOOD INSECURITY: WITHIN THE PAST 12 MONTHS, THE FOOD YOU BOUGHT JUST DIDN'T LAST AND YOU DIDN'T HAVE MONEY TO GET MORE.: NEVER TRUE

## 2025-04-12 SDOH — ECONOMIC STABILITY: FOOD INSECURITY: WITHIN THE PAST 12 MONTHS, YOU WORRIED THAT YOUR FOOD WOULD RUN OUT BEFORE YOU GOT MONEY TO BUY MORE.: NEVER TRUE

## 2025-04-12 SDOH — ECONOMIC STABILITY: INCOME INSECURITY: IN THE LAST 12 MONTHS, WAS THERE A TIME WHEN YOU WERE NOT ABLE TO PAY THE MORTGAGE OR RENT ON TIME?: NO

## 2025-04-12 SDOH — ECONOMIC STABILITY: TRANSPORTATION INSECURITY
IN THE PAST 12 MONTHS, HAS THE LACK OF TRANSPORTATION KEPT YOU FROM MEDICAL APPOINTMENTS OR FROM GETTING MEDICATIONS?: NO

## 2025-04-15 ENCOUNTER — OFFICE VISIT (OUTPATIENT)
Facility: CLINIC | Age: 85
End: 2025-04-15
Payer: MEDICARE

## 2025-04-15 VITALS
WEIGHT: 198.8 LBS | HEIGHT: 71 IN | TEMPERATURE: 97.7 F | RESPIRATION RATE: 17 BRPM | SYSTOLIC BLOOD PRESSURE: 129 MMHG | OXYGEN SATURATION: 98 % | DIASTOLIC BLOOD PRESSURE: 81 MMHG | BODY MASS INDEX: 27.83 KG/M2 | HEART RATE: 98 BPM

## 2025-04-15 DIAGNOSIS — Z00.00 MEDICARE ANNUAL WELLNESS VISIT, SUBSEQUENT: Primary | ICD-10-CM

## 2025-04-15 DIAGNOSIS — I10 ESSENTIAL (PRIMARY) HYPERTENSION: ICD-10-CM

## 2025-04-15 DIAGNOSIS — E78.00 PURE HYPERCHOLESTEROLEMIA, UNSPECIFIED: ICD-10-CM

## 2025-04-15 DIAGNOSIS — E11.9 TYPE 2 DIABETES MELLITUS WITHOUT COMPLICATION, WITHOUT LONG-TERM CURRENT USE OF INSULIN (HCC): ICD-10-CM

## 2025-04-15 DIAGNOSIS — E11.9 TYPE 2 DIABETES MELLITUS WITHOUT COMPLICATION, WITHOUT LONG-TERM CURRENT USE OF INSULIN: ICD-10-CM

## 2025-04-15 DIAGNOSIS — I80.01 THROMBOPHLEBITIS OF SUPERFICIAL VEINS OF RIGHT LOWER EXTREMITY: ICD-10-CM

## 2025-04-15 PROCEDURE — G8427 DOCREV CUR MEDS BY ELIG CLIN: HCPCS | Performed by: FAMILY MEDICINE

## 2025-04-15 PROCEDURE — 1036F TOBACCO NON-USER: CPT | Performed by: FAMILY MEDICINE

## 2025-04-15 PROCEDURE — 1159F MED LIST DOCD IN RCRD: CPT | Performed by: FAMILY MEDICINE

## 2025-04-15 PROCEDURE — 1123F ACP DISCUSS/DSCN MKR DOCD: CPT | Performed by: FAMILY MEDICINE

## 2025-04-15 PROCEDURE — 1090F PRES/ABSN URINE INCON ASSESS: CPT | Performed by: FAMILY MEDICINE

## 2025-04-15 PROCEDURE — 3074F SYST BP LT 130 MM HG: CPT | Performed by: FAMILY MEDICINE

## 2025-04-15 PROCEDURE — 99214 OFFICE O/P EST MOD 30 MIN: CPT | Performed by: FAMILY MEDICINE

## 2025-04-15 PROCEDURE — G8400 PT W/DXA NO RESULTS DOC: HCPCS | Performed by: FAMILY MEDICINE

## 2025-04-15 PROCEDURE — G0439 PPPS, SUBSEQ VISIT: HCPCS | Performed by: FAMILY MEDICINE

## 2025-04-15 PROCEDURE — 1126F AMNT PAIN NOTED NONE PRSNT: CPT | Performed by: FAMILY MEDICINE

## 2025-04-15 PROCEDURE — 3079F DIAST BP 80-89 MM HG: CPT | Performed by: FAMILY MEDICINE

## 2025-04-15 PROCEDURE — G8419 CALC BMI OUT NRM PARAM NOF/U: HCPCS | Performed by: FAMILY MEDICINE

## 2025-04-15 RX ORDER — AMLODIPINE BESYLATE 5 MG/1
5 TABLET ORAL DAILY
Qty: 90 TABLET | Refills: 1 | Status: SHIPPED | OUTPATIENT
Start: 2025-04-15

## 2025-04-15 SDOH — ECONOMIC STABILITY: FOOD INSECURITY: WITHIN THE PAST 12 MONTHS, THE FOOD YOU BOUGHT JUST DIDN'T LAST AND YOU DIDN'T HAVE MONEY TO GET MORE.: NEVER TRUE

## 2025-04-15 SDOH — ECONOMIC STABILITY: FOOD INSECURITY: WITHIN THE PAST 12 MONTHS, YOU WORRIED THAT YOUR FOOD WOULD RUN OUT BEFORE YOU GOT MONEY TO BUY MORE.: NEVER TRUE

## 2025-04-15 ASSESSMENT — PATIENT HEALTH QUESTIONNAIRE - PHQ9
SUM OF ALL RESPONSES TO PHQ QUESTIONS 1-9: 0
1. LITTLE INTEREST OR PLEASURE IN DOING THINGS: NOT AT ALL
SUM OF ALL RESPONSES TO PHQ QUESTIONS 1-9: 0
2. FEELING DOWN, DEPRESSED OR HOPELESS: NOT AT ALL
SUM OF ALL RESPONSES TO PHQ QUESTIONS 1-9: 0
SUM OF ALL RESPONSES TO PHQ QUESTIONS 1-9: 0

## 2025-04-15 ASSESSMENT — ENCOUNTER SYMPTOMS
BLOOD IN STOOL: 0
SHORTNESS OF BREATH: 0

## 2025-04-15 ASSESSMENT — LIFESTYLE VARIABLES: HOW OFTEN DO YOU HAVE A DRINK CONTAINING ALCOHOL: MONTHLY OR LESS

## 2025-04-15 NOTE — PROGRESS NOTES
Noemy Merida  84 y.o. female  1940  MRN:[id number]  [facility]  Progress Note     Encounter Date: 4/15/2025    Assessment and Plan:       ICD-10-CM    1. Medicare annual wellness visit, subsequent  Z00.00       2. Essential (primary) hypertension  I10 Albumin/Creatinine Ratio, Urine     Basic Metabolic Panel     amLODIPine (NORVASC) 5 MG tablet      3. Pure hypercholesterolemia, unspecified  E78.00 Lipid Panel     Hepatic Function Panel      4. Type 2 diabetes mellitus without complication, without long-term current use of insulin  E11.9 Hemoglobin A1C      DIABETES FOOT EXAM     CBC with Auto Differential      5. Thrombophlebitis of superficial veins of right lower extremity  I80.01         1. Medicare annual wellness visit, subsequent  updated  2. Essential (primary) hypertension  Continue BP meds, at goal  Update labs  -     Albumin/Creatinine Ratio, Urine; Future  -     Basic Metabolic Panel; Future  -     amLODIPine (NORVASC) 5 MG tablet; Take 1 tablet by mouth daily, Disp-90 tablet, R-1Normal  3. Pure hypercholesterolemia, unspecified  Continue statin.  -     Lipid Panel; Future  -     Hepatic Function Panel; Future  4. Type 2 diabetes mellitus without complication, without long-term current use of insulin  Diet only  Update labs  -     Hemoglobin A1C; Future  -      DIABETES FOOT EXAM  -     CBC with Auto Differential; Future  5. Thrombophlebitis of superficial veins of right lower extremity  Lateral lower leg  Aspercreme.  FU if persists.       I have discussed the diagnosis with the patient and the intended plan as seen in the above orders.  she has expressed understanding.  The patient has received an after-visit summary and questions were answered concerning future plans.  I have discussed medication side effects and warnings with the patient as well.    Electronically Signed: Nathaniel Finley MD    Current Medications after this visit     Current Outpatient

## 2025-04-16 LAB
ALBUMIN SERPL-MCNC: 3.8 G/DL (ref 3.5–5)
ALBUMIN/GLOB SERPL: 1 (ref 1.1–2.2)
ALP SERPL-CCNC: 82 U/L (ref 45–117)
ALT SERPL-CCNC: 35 U/L (ref 12–78)
ANION GAP SERPL CALC-SCNC: 3 MMOL/L (ref 2–12)
AST SERPL-CCNC: 36 U/L (ref 15–37)
BASOPHILS # BLD: 0.04 K/UL (ref 0–0.1)
BASOPHILS NFR BLD: 0.8 % (ref 0–1)
BILIRUB DIRECT SERPL-MCNC: 0.2 MG/DL (ref 0–0.2)
BILIRUB SERPL-MCNC: 0.5 MG/DL (ref 0.2–1)
BUN SERPL-MCNC: 10 MG/DL (ref 6–20)
BUN/CREAT SERPL: 14 (ref 12–20)
CALCIUM SERPL-MCNC: 9.9 MG/DL (ref 8.5–10.1)
CHLORIDE SERPL-SCNC: 103 MMOL/L (ref 97–108)
CHOLEST SERPL-MCNC: 155 MG/DL
CO2 SERPL-SCNC: 32 MMOL/L (ref 21–32)
CREAT SERPL-MCNC: 0.74 MG/DL (ref 0.55–1.02)
CREAT UR-MCNC: 122 MG/DL
DIFFERENTIAL METHOD BLD: ABNORMAL
EOSINOPHIL # BLD: 0.15 K/UL (ref 0–0.4)
EOSINOPHIL NFR BLD: 3.1 % (ref 0–7)
ERYTHROCYTE [DISTWIDTH] IN BLOOD BY AUTOMATED COUNT: 14.9 % (ref 11.5–14.5)
EST. AVERAGE GLUCOSE BLD GHB EST-MCNC: 143 MG/DL
GLOBULIN SER CALC-MCNC: 4 G/DL (ref 2–4)
GLUCOSE SERPL-MCNC: 108 MG/DL (ref 65–100)
HBA1C MFR BLD: 6.6 % (ref 4–5.6)
HCT VFR BLD AUTO: 47.5 % (ref 35–47)
HDLC SERPL-MCNC: 77 MG/DL
HDLC SERPL: 2 (ref 0–5)
HGB BLD-MCNC: 14.4 G/DL (ref 11.5–16)
IMM GRANULOCYTES # BLD AUTO: 0 K/UL (ref 0–0.04)
IMM GRANULOCYTES NFR BLD AUTO: 0 % (ref 0–0.5)
LDLC SERPL CALC-MCNC: 66 MG/DL (ref 0–100)
LYMPHOCYTES # BLD: 2.09 K/UL (ref 0.8–3.5)
LYMPHOCYTES NFR BLD: 43.5 % (ref 12–49)
MCH RBC QN AUTO: 25.4 PG (ref 26–34)
MCHC RBC AUTO-ENTMCNC: 30.3 G/DL (ref 30–36.5)
MCV RBC AUTO: 83.8 FL (ref 80–99)
MICROALBUMIN UR-MCNC: 0.54 MG/DL
MICROALBUMIN/CREAT UR-RTO: 4 MG/G (ref 0–30)
MONOCYTES # BLD: 0.44 K/UL (ref 0–1)
MONOCYTES NFR BLD: 9.1 % (ref 5–13)
NEUTS SEG # BLD: 2.09 K/UL (ref 1.8–8)
NEUTS SEG NFR BLD: 43.5 % (ref 32–75)
NRBC # BLD: 0 K/UL (ref 0–0.01)
NRBC BLD-RTO: 0 PER 100 WBC
PLATELET # BLD AUTO: 316 K/UL (ref 150–400)
PMV BLD AUTO: 10.4 FL (ref 8.9–12.9)
POTASSIUM SERPL-SCNC: 4.2 MMOL/L (ref 3.5–5.1)
PROT SERPL-MCNC: 7.8 G/DL (ref 6.4–8.2)
RBC # BLD AUTO: 5.67 M/UL (ref 3.8–5.2)
SODIUM SERPL-SCNC: 138 MMOL/L (ref 136–145)
TRIGL SERPL-MCNC: 60 MG/DL
VLDLC SERPL CALC-MCNC: 12 MG/DL
WBC # BLD AUTO: 4.8 K/UL (ref 3.6–11)

## 2025-04-17 ENCOUNTER — RESULTS FOLLOW-UP (OUTPATIENT)
Facility: CLINIC | Age: 85
End: 2025-04-17

## 2025-08-11 DIAGNOSIS — E78.00 PURE HYPERCHOLESTEROLEMIA, UNSPECIFIED: ICD-10-CM

## 2025-08-11 RX ORDER — ROSUVASTATIN CALCIUM 5 MG/1
5 TABLET, COATED ORAL DAILY
Qty: 90 TABLET | Refills: 1 | Status: SHIPPED | OUTPATIENT
Start: 2025-08-11

## 2025-09-03 DIAGNOSIS — I10 ESSENTIAL (PRIMARY) HYPERTENSION: ICD-10-CM

## 2025-09-03 RX ORDER — ENALAPRIL MALEATE AND HYDROCHLOROTHIAZIDE 5; 12.5 MG/1; MG/1
1 TABLET ORAL DAILY
Qty: 90 TABLET | Refills: 1 | Status: SHIPPED | OUTPATIENT
Start: 2025-09-03

## (undated) DEVICE — BIT DRL L12MM DIA2.2MM BLU FLAT CHK FOR ANT CERV PLT SYS

## (undated) DEVICE — COVER,TABLE,HEAVY DUTY,60"X90",STRL: Brand: MEDLINE

## (undated) DEVICE — SPONGE: SPECIALTY PEANUT XR 100/CS: Brand: MEDICAL ACTION INDUSTRIES

## (undated) DEVICE — HANDLE LT SNAP ON ULT DURABLE LENS FOR TRUMPF ALC DISPOSABLE

## (undated) DEVICE — STRAP,POSITIONING,KNEE/BODY,FOAM,4X60": Brand: MEDLINE

## (undated) DEVICE — DRAPE MICSCP W46XL120IN POLY DRAWSTRAP W STEREO OBS TB AND

## (undated) DEVICE — SCREW EXT FIX L14MM FOR DISTRCTN

## (undated) DEVICE — SOLUTION IV 1000ML 0.9% SOD CHL

## (undated) DEVICE — NEEDLE SPNL 20GA L3.5IN YEL HUB S STL REG WALL FIT STYL W/

## (undated) DEVICE — STERILE POLYISOPRENE POWDER-FREE SURGICAL GLOVES WITH EMOLLIENT COATING: Brand: PROTEXIS

## (undated) DEVICE — DRAPE,LAPAROTOMY,PED,STERILE: Brand: MEDLINE

## (undated) DEVICE — TOOL 14MH30 LEGEND 14CM 3MM: Brand: MIDAS REX ™

## (undated) DEVICE — TELFA ADHESIVE ISLAND DRESSING: Brand: TELFA

## (undated) DEVICE — SOLUTION IV 250ML 0.9% SOD CHL CLR INJ FLX BG CONT PRT CLSR

## (undated) DEVICE — SUTURE MCRYL SZ 4-0 L27IN ABSRB UD L19MM PS-2 1/2 CIR PRIM Y426H

## (undated) DEVICE — INTENDED FOR TISSUE SEPARATION, AND OTHER PROCEDURES THAT REQUIRE A SHARP SURGICAL BLADE TO PUNCTURE OR CUT.: Brand: BARD-PARKER ® CARBON RIB-BACK BLADES

## (undated) DEVICE — DERMABOND SKIN ADH 0.7ML -- DERMABOND ADVANCED 12/BX

## (undated) DEVICE — SOLUTION SURG PREP 26 CC PURPREP

## (undated) DEVICE — KIT EVAC 0.13IN RECT TB DIA10FR 400CC PVC 3 SPR Y CONN DRN

## (undated) DEVICE — PIN FIX THRD FOR SKYLINE ANT CERV PLT SYS

## (undated) DEVICE — INSULATED BLADE ELECTRODE: Brand: EDGE

## (undated) DEVICE — TOTAL TRAY, 16FR 10ML SIL FOLEY, URN: Brand: MEDLINE

## (undated) DEVICE — DRAPE SURG W41XL74IN CLR FULL SZ C ARM 3 ADH POLY STRP E

## (undated) DEVICE — FLOSEAL MATRIX IS INDICATED IN SURGICAL PROCEDURES (OTHER THAN IN OPHTHALMIC) AS AN ADJUNCT TO HEMOSTASIS WHEN CONTROL OF BLEEDING BY LIGATURE OR CONVENTIONALPROCEDURES IS INEFFECTIVE OR IMPRACTICAL.: Brand: FLOSEAL HEMOSTATIC MATRIX

## (undated) DEVICE — BIPOLAR IRRIGATOR INTEGRATED TUBING AND BIPOLAR CORD SET, DISPOSABLE

## (undated) DEVICE — INFECTION CONTROL KIT SYS

## (undated) DEVICE — LAMINECTOMY RICHMOND-LF: Brand: MEDLINE INDUSTRIES, INC.

## (undated) DEVICE — TUBING, SUCTION, 1/4" X 10', STRAIGHT: Brand: MEDLINE

## (undated) DEVICE — GARMENT,MEDLINE,DVT,INT,CALF,MED, GEN2: Brand: MEDLINE

## (undated) DEVICE — BONE WAX WHITE: Brand: BONE WAX WHITE

## (undated) DEVICE — SYR 20ML LL STRL LF --

## (undated) DEVICE — SUTURE VCRL SZ 2-0 L18IN ABSRB UD L26MM CP-2 1/2 CIR REV J762D